# Patient Record
Sex: MALE | Race: WHITE | NOT HISPANIC OR LATINO | Employment: PART TIME | URBAN - METROPOLITAN AREA
[De-identification: names, ages, dates, MRNs, and addresses within clinical notes are randomized per-mention and may not be internally consistent; named-entity substitution may affect disease eponyms.]

---

## 2023-02-22 LAB
ALANINE AMINOTRANSFERASE (SGPT) (U/L) IN SER/PLAS: 19 U/L (ref 10–52)
ALBUMIN (G/DL) IN SER/PLAS: 4.8 G/DL (ref 3.4–5)
ALKALINE PHOSPHATASE (U/L) IN SER/PLAS: 79 U/L (ref 33–136)
ANION GAP IN SER/PLAS: 11 MMOL/L (ref 10–20)
ASPARTATE AMINOTRANSFERASE (SGOT) (U/L) IN SER/PLAS: 19 U/L (ref 9–39)
BASOPHILS (10*3/UL) IN BLOOD BY AUTOMATED COUNT: 0.03 X10E9/L (ref 0–0.1)
BASOPHILS/100 LEUKOCYTES IN BLOOD BY AUTOMATED COUNT: 0.4 % (ref 0–2)
BILIRUBIN TOTAL (MG/DL) IN SER/PLAS: 0.7 MG/DL (ref 0–1.2)
CALCIUM (MG/DL) IN SER/PLAS: 9.2 MG/DL (ref 8.6–10.3)
CARBON DIOXIDE, TOTAL (MMOL/L) IN SER/PLAS: 29 MMOL/L (ref 21–32)
CHLORIDE (MMOL/L) IN SER/PLAS: 101 MMOL/L (ref 98–107)
CHOLESTEROL (MG/DL) IN SER/PLAS: 199 MG/DL (ref 0–199)
CHOLESTEROL IN HDL (MG/DL) IN SER/PLAS: 72.6 MG/DL
CHOLESTEROL/HDL RATIO: 2.7
CREATININE (MG/DL) IN SER/PLAS: 0.97 MG/DL (ref 0.5–1.3)
EOSINOPHILS (10*3/UL) IN BLOOD BY AUTOMATED COUNT: 0.06 X10E9/L (ref 0–0.7)
EOSINOPHILS/100 LEUKOCYTES IN BLOOD BY AUTOMATED COUNT: 0.9 % (ref 0–6)
ERYTHROCYTE DISTRIBUTION WIDTH (RATIO) BY AUTOMATED COUNT: 12.6 % (ref 11.5–14.5)
ERYTHROCYTE MEAN CORPUSCULAR HEMOGLOBIN CONCENTRATION (G/DL) BY AUTOMATED: 33.3 G/DL (ref 32–36)
ERYTHROCYTE MEAN CORPUSCULAR VOLUME (FL) BY AUTOMATED COUNT: 91 FL (ref 80–100)
ERYTHROCYTES (10*6/UL) IN BLOOD BY AUTOMATED COUNT: 4.85 X10E12/L (ref 4.5–5.9)
ESTIMATED AVERAGE GLUCOSE FOR HBA1C: 111 MG/DL
GFR MALE: 87 ML/MIN/1.73M2
GLUCOSE (MG/DL) IN SER/PLAS: 92 MG/DL (ref 74–99)
HEMATOCRIT (%) IN BLOOD BY AUTOMATED COUNT: 44.2 % (ref 41–52)
HEMOGLOBIN (G/DL) IN BLOOD: 14.7 G/DL (ref 13.5–17.5)
HEMOGLOBIN A1C/HEMOGLOBIN TOTAL IN BLOOD: 5.5 %
IMMATURE GRANULOCYTES/100 LEUKOCYTES IN BLOOD BY AUTOMATED COUNT: 0.4 % (ref 0–0.9)
LDL: 116 MG/DL (ref 0–99)
LEUKOCYTES (10*3/UL) IN BLOOD BY AUTOMATED COUNT: 6.8 X10E9/L (ref 4.4–11.3)
LYMPHOCYTES (10*3/UL) IN BLOOD BY AUTOMATED COUNT: 1.81 X10E9/L (ref 1.2–4.8)
LYMPHOCYTES/100 LEUKOCYTES IN BLOOD BY AUTOMATED COUNT: 26.8 % (ref 13–44)
MONOCYTES (10*3/UL) IN BLOOD BY AUTOMATED COUNT: 0.68 X10E9/L (ref 0.1–1)
MONOCYTES/100 LEUKOCYTES IN BLOOD BY AUTOMATED COUNT: 10.1 % (ref 2–10)
NEUTROPHILS (10*3/UL) IN BLOOD BY AUTOMATED COUNT: 4.14 X10E9/L (ref 1.2–7.7)
NEUTROPHILS/100 LEUKOCYTES IN BLOOD BY AUTOMATED COUNT: 61.4 % (ref 40–80)
PLATELETS (10*3/UL) IN BLOOD AUTOMATED COUNT: 284 X10E9/L (ref 150–450)
POTASSIUM (MMOL/L) IN SER/PLAS: 4.3 MMOL/L (ref 3.5–5.3)
PROTEIN TOTAL: 7.7 G/DL (ref 6.4–8.2)
SODIUM (MMOL/L) IN SER/PLAS: 137 MMOL/L (ref 136–145)
TRIGLYCERIDE (MG/DL) IN SER/PLAS: 51 MG/DL (ref 0–149)
UREA NITROGEN (MG/DL) IN SER/PLAS: 15 MG/DL (ref 6–23)
VLDL: 10 MG/DL (ref 0–40)

## 2023-02-23 LAB — PROSTATE SPECIFIC AG (NG/ML) IN SER/PLAS: 0.54 NG/ML (ref 0–4)

## 2023-07-24 DIAGNOSIS — I10 ESSENTIAL (PRIMARY) HYPERTENSION: ICD-10-CM

## 2023-07-24 PROBLEM — N52.9 ERECTILE DYSFUNCTION: Status: ACTIVE | Noted: 2023-07-24

## 2023-07-24 PROBLEM — R73.9 HYPERGLYCEMIA: Status: ACTIVE | Noted: 2023-07-24

## 2023-07-24 PROBLEM — K64.4 HEMORRHOIDS, EXTERNAL: Status: ACTIVE | Noted: 2023-07-24

## 2023-07-24 PROBLEM — J45.20 MILD INTERMITTENT ASTHMA WITHOUT COMPLICATION (HHS-HCC): Status: ACTIVE | Noted: 2023-07-24

## 2023-07-24 PROBLEM — N40.2 PROSTATE NODULE: Status: ACTIVE | Noted: 2023-07-24

## 2023-07-24 PROBLEM — N41.9 PROSTATITIS: Status: RESOLVED | Noted: 2023-07-24 | Resolved: 2023-07-24

## 2023-07-24 PROBLEM — H57.10 EYE PAIN: Status: RESOLVED | Noted: 2023-07-24 | Resolved: 2023-07-24

## 2023-07-24 PROBLEM — M17.0 ARTHRITIS OF BOTH KNEES: Status: ACTIVE | Noted: 2023-07-24

## 2023-07-24 PROBLEM — R06.02 SHORTNESS OF BREATH ON EXERTION: Status: ACTIVE | Noted: 2023-07-24

## 2023-07-24 PROBLEM — K21.9 GERD (GASTROESOPHAGEAL REFLUX DISEASE): Status: ACTIVE | Noted: 2023-07-24

## 2023-07-24 PROBLEM — M25.50 ARTHRALGIA: Status: ACTIVE | Noted: 2023-07-24

## 2023-07-24 PROBLEM — M25.562 LEFT KNEE PAIN: Status: ACTIVE | Noted: 2023-07-24

## 2023-07-24 RX ORDER — MELOXICAM 15 MG/1
1 TABLET ORAL DAILY PRN
COMMUNITY
Start: 2021-01-07 | End: 2024-01-29

## 2023-07-24 RX ORDER — NAPROXEN 500 MG/1
TABLET ORAL
COMMUNITY
Start: 2023-02-01 | End: 2024-01-10 | Stop reason: ALTCHOICE

## 2023-07-24 RX ORDER — LOSARTAN POTASSIUM 50 MG/1
1 TABLET ORAL DAILY
COMMUNITY
Start: 2022-05-10 | End: 2024-01-09 | Stop reason: WASHOUT

## 2023-07-24 RX ORDER — SILDENAFIL 100 MG/1
TABLET, FILM COATED ORAL
COMMUNITY
Start: 2018-08-30

## 2023-07-24 RX ORDER — AMLODIPINE BESYLATE 10 MG/1
1 TABLET ORAL DAILY
COMMUNITY
Start: 2022-05-10 | End: 2024-01-09 | Stop reason: WASHOUT

## 2023-07-24 RX ORDER — OMEPRAZOLE 40 MG/1
CAPSULE, DELAYED RELEASE ORAL
COMMUNITY
Start: 2020-12-28 | End: 2024-01-10 | Stop reason: SDUPTHER

## 2023-07-24 RX ORDER — ALBUTEROL SULFATE 90 UG/1
AEROSOL, METERED RESPIRATORY (INHALATION)
COMMUNITY
Start: 2018-02-07

## 2023-07-24 RX ORDER — FLUTICASONE PROPIONATE 50 MCG
SPRAY, SUSPENSION (ML) NASAL
COMMUNITY
Start: 2022-04-25

## 2023-07-24 RX ORDER — HYALURONATE SODIUM, STABILIZED 60 MG/3 ML
SYRINGE (ML) INTRAARTICULAR
COMMUNITY
Start: 2023-02-17 | End: 2024-01-10 | Stop reason: ALTCHOICE

## 2023-07-25 RX ORDER — LOSARTAN POTASSIUM 50 MG/1
50 TABLET ORAL DAILY
Qty: 90 TABLET | Refills: 0 | Status: SHIPPED | OUTPATIENT
Start: 2023-07-25 | End: 2024-01-10 | Stop reason: ALTCHOICE

## 2023-07-25 RX ORDER — AMLODIPINE BESYLATE 10 MG/1
10 TABLET ORAL DAILY
Qty: 90 TABLET | Refills: 0 | Status: SHIPPED | OUTPATIENT
Start: 2023-07-25 | End: 2024-01-10 | Stop reason: ALTCHOICE

## 2024-01-09 PROBLEM — R06.02 SHORTNESS OF BREATH ON EXERTION: Status: RESOLVED | Noted: 2023-07-24 | Resolved: 2024-01-09

## 2024-01-10 ENCOUNTER — OFFICE VISIT (OUTPATIENT)
Dept: PRIMARY CARE | Facility: CLINIC | Age: 64
End: 2024-01-10
Payer: MEDICARE

## 2024-01-10 VITALS
HEART RATE: 64 BPM | OXYGEN SATURATION: 96 % | DIASTOLIC BLOOD PRESSURE: 98 MMHG | TEMPERATURE: 98.6 F | SYSTOLIC BLOOD PRESSURE: 142 MMHG

## 2024-01-10 DIAGNOSIS — E78.2 MIXED HYPERLIPIDEMIA: ICD-10-CM

## 2024-01-10 DIAGNOSIS — K21.9 GASTROESOPHAGEAL REFLUX DISEASE WITHOUT ESOPHAGITIS: ICD-10-CM

## 2024-01-10 DIAGNOSIS — M17.12 PRIMARY OSTEOARTHRITIS OF LEFT KNEE: ICD-10-CM

## 2024-01-10 DIAGNOSIS — K22.719 BARRETT'S ESOPHAGUS WITH DYSPLASIA: ICD-10-CM

## 2024-01-10 DIAGNOSIS — I10 BENIGN HYPERTENSION: Primary | ICD-10-CM

## 2024-01-10 PROCEDURE — 3080F DIAST BP >= 90 MM HG: CPT | Performed by: FAMILY MEDICINE

## 2024-01-10 PROCEDURE — 99214 OFFICE O/P EST MOD 30 MIN: CPT | Performed by: FAMILY MEDICINE

## 2024-01-10 PROCEDURE — 1036F TOBACCO NON-USER: CPT | Performed by: FAMILY MEDICINE

## 2024-01-10 PROCEDURE — 3077F SYST BP >= 140 MM HG: CPT | Performed by: FAMILY MEDICINE

## 2024-01-10 PROCEDURE — 1125F AMNT PAIN NOTED PAIN PRSNT: CPT | Performed by: FAMILY MEDICINE

## 2024-01-10 PROCEDURE — 1159F MED LIST DOCD IN RCRD: CPT | Performed by: FAMILY MEDICINE

## 2024-01-10 RX ORDER — AMLODIPINE BESYLATE 5 MG/1
5 TABLET ORAL DAILY
COMMUNITY
Start: 2023-12-17 | End: 2024-01-10 | Stop reason: SDUPTHER

## 2024-01-10 RX ORDER — CHLORHEXIDINE GLUCONATE ORAL RINSE 1.2 MG/ML
5 SOLUTION DENTAL AS NEEDED
COMMUNITY
Start: 2023-11-02

## 2024-01-10 RX ORDER — OMEPRAZOLE 40 MG/1
CAPSULE, DELAYED RELEASE ORAL
Qty: 90 CAPSULE | Refills: 3 | Status: SHIPPED | OUTPATIENT
Start: 2024-01-10

## 2024-01-10 RX ORDER — LOSARTAN POTASSIUM 100 MG/1
100 TABLET ORAL DAILY
COMMUNITY
Start: 2023-12-17 | End: 2024-01-10 | Stop reason: SDUPTHER

## 2024-01-10 RX ORDER — ROSUVASTATIN CALCIUM 10 MG/1
10 TABLET, COATED ORAL DAILY
Qty: 90 TABLET | Refills: 3 | Status: SHIPPED | OUTPATIENT
Start: 2024-01-10 | End: 2025-01-09

## 2024-01-10 RX ORDER — AMLODIPINE BESYLATE 5 MG/1
5 TABLET ORAL DAILY
Qty: 90 TABLET | Refills: 3 | Status: SHIPPED | OUTPATIENT
Start: 2024-01-10

## 2024-01-10 RX ORDER — LOSARTAN POTASSIUM 100 MG/1
100 TABLET ORAL DAILY
Qty: 90 TABLET | Refills: 3 | Status: SHIPPED | OUTPATIENT
Start: 2024-01-10

## 2024-01-10 ASSESSMENT — LIFESTYLE VARIABLES
HOW OFTEN DO YOU HAVE SIX OR MORE DRINKS ON ONE OCCASION: NEVER
SKIP TO QUESTIONS 9-10: 1
HOW OFTEN DO YOU HAVE A DRINK CONTAINING ALCOHOL: 4 OR MORE TIMES A WEEK
HOW MANY STANDARD DRINKS CONTAINING ALCOHOL DO YOU HAVE ON A TYPICAL DAY: 1 OR 2
AUDIT-C TOTAL SCORE: 4

## 2024-01-10 ASSESSMENT — PATIENT HEALTH QUESTIONNAIRE - PHQ9
2. FEELING DOWN, DEPRESSED OR HOPELESS: NOT AT ALL
1. LITTLE INTEREST OR PLEASURE IN DOING THINGS: NOT AT ALL
SUM OF ALL RESPONSES TO PHQ9 QUESTIONS 1 & 2: 0

## 2024-01-10 ASSESSMENT — ENCOUNTER SYMPTOMS
LOSS OF SENSATION IN FEET: 0
DEPRESSION: 0
OCCASIONAL FEELINGS OF UNSTEADINESS: 0

## 2024-01-10 ASSESSMENT — PAIN SCALES - GENERAL: PAINLEVEL: 3

## 2024-01-10 NOTE — PROGRESS NOTES
Subjective   Patient ID: Gian Prado is a 64 y.o. male who presents for 3 MO F/U.    Hypertension  -Patient is here for follow-up of elevated blood pressure.   -Blood pressure is well controlled.  -Cardiac symptoms: none.   -Patient denies chest pain, dyspnea, and irregular heart beat.   -Cardiologist:    Osteoarthritis/Pain Management  -History of: bilateral knee osteoarthritis  -F/U: Worsening pain.  Worse in left knee.  Worse as day progresses.  Using golf cart due to pain.  Pain stretching leg out.   -Treatment: Steroid injections and synvisc - no benefit.   Meloxicam - no benefit.    -Past Evaluation:  -Specialist: Dr. Giron  -Past Medications:           Review of Systems    Objective   BP (!) 142/98 (BP Location: Right arm, Patient Position: Sitting)   Pulse 64   Temp 37 °C (98.6 °F) (Temporal)   SpO2 96%     Physical Exam  Vitals reviewed.   Constitutional:       General: He is not in acute distress.  Cardiovascular:      Rate and Rhythm: Normal rate and regular rhythm.   Pulmonary:      Effort: Pulmonary effort is normal.      Breath sounds: No wheezing or rhonchi.   Musculoskeletal:      Right lower leg: No edema.      Left lower leg: No edema.   Lymphadenopathy:      Cervical: No cervical adenopathy.   Neurological:      Mental Status: He is alert.         Assessment/Plan   Diagnoses and all orders for this visit:  Benign hypertension  Mixed hyperlipidemia  Gastroesophageal reflux disease without esophagitis  Primary osteoarthritis of left knee    Patient Instructions   For knee osteoarthritis - recommend seeing Dr. Giron for opinion on replacement    For blood pressure - continue current regimen.      For cholesterol - pt has elevated CT cardiac score - recommend crestor 10mg once a day.      For barretts - stable on PPI medication.     For immunizatinos - recommend prevnar 20 (pneumonia vaccine), yearly flu shot, and shingles vaccine (shingrix).      Follow up in 6 months.

## 2024-01-10 NOTE — PATIENT INSTRUCTIONS
For knee osteoarthritis - recommend seeing Dr. Giron for opinion on replacement    For blood pressure - continue current regimen.      For cholesterol - pt has elevated CT cardiac score - recommend crestor 10mg once a day.      For barretts - stable on PPI medication.     For immunizatinos - recommend prevnar 20 (pneumonia vaccine), yearly flu shot, and shingles vaccine (shingrix).      Follow up in 6 months.

## 2024-01-26 DIAGNOSIS — M25.50 PAIN IN UNSPECIFIED JOINT: ICD-10-CM

## 2024-01-29 RX ORDER — MELOXICAM 15 MG/1
15 TABLET ORAL DAILY PRN
Qty: 90 TABLET | Refills: 3 | Status: SHIPPED | OUTPATIENT
Start: 2024-01-29

## 2024-02-06 ENCOUNTER — HOSPITAL ENCOUNTER (OUTPATIENT)
Facility: HOSPITAL | Age: 64
Setting detail: SURGERY ADMIT
End: 2024-02-06
Attending: ORTHOPAEDIC SURGERY | Admitting: ORTHOPAEDIC SURGERY
Payer: MEDICARE

## 2024-02-06 ENCOUNTER — HOSPITAL ENCOUNTER (OUTPATIENT)
Dept: RADIOLOGY | Facility: HOSPITAL | Age: 64
Discharge: HOME | End: 2024-02-06
Payer: MEDICARE

## 2024-02-06 ENCOUNTER — OFFICE VISIT (OUTPATIENT)
Dept: ORTHOPEDIC SURGERY | Facility: CLINIC | Age: 64
End: 2024-02-06
Payer: MEDICARE

## 2024-02-06 DIAGNOSIS — M17.0 ARTHRITIS OF BOTH KNEES: ICD-10-CM

## 2024-02-06 DIAGNOSIS — M17.0 ARTHRITIS OF BOTH KNEES: Primary | ICD-10-CM

## 2024-02-06 PROCEDURE — 1036F TOBACCO NON-USER: CPT | Performed by: ORTHOPAEDIC SURGERY

## 2024-02-06 PROCEDURE — 1159F MED LIST DOCD IN RCRD: CPT | Performed by: ORTHOPAEDIC SURGERY

## 2024-02-06 PROCEDURE — 99215 OFFICE O/P EST HI 40 MIN: CPT | Performed by: ORTHOPAEDIC SURGERY

## 2024-02-06 PROCEDURE — 1160F RVW MEDS BY RX/DR IN RCRD: CPT | Performed by: ORTHOPAEDIC SURGERY

## 2024-02-06 PROCEDURE — 73564 X-RAY EXAM KNEE 4 OR MORE: CPT | Mod: LT

## 2024-02-06 PROCEDURE — 1125F AMNT PAIN NOTED PAIN PRSNT: CPT | Performed by: ORTHOPAEDIC SURGERY

## 2024-02-06 RX ORDER — ACETAMINOPHEN 325 MG/1
975 TABLET ORAL ONCE
Status: CANCELLED | OUTPATIENT
Start: 2024-02-06 | End: 2024-02-06

## 2024-02-06 RX ORDER — SODIUM CHLORIDE 9 MG/ML
100 INJECTION, SOLUTION INTRAVENOUS CONTINUOUS
Status: CANCELLED | OUTPATIENT
Start: 2024-02-06

## 2024-02-06 RX ORDER — CELECOXIB 400 MG/1
400 CAPSULE ORAL ONCE
Status: CANCELLED | OUTPATIENT
Start: 2024-02-06 | End: 2024-02-06

## 2024-02-06 NOTE — PROGRESS NOTES
This is a consultation from Dr. Keith Doe DO for   Chief Complaint   Patient presents with    Left Knee - Pain     LT KNEE PAIN- CONSULT SURGERY       This is a 64 y.o. male who presents for follow-up for his left knee.  Patient has severe left knee arthritis, had a gel injection about 6 months ago.  Did not work very well and since then his pains been getting worse.  He is experience severe exacerbation of his left knee pain over the last few weeks, sharp stabbing pain over the lateral aspect of the knee.  Associated with instability and difficulty walking.  He has not had an extensive trial of nonsurgical management including use of anti-inflammatories physical therapy activity modifications assistive devices cortisone injections.  Despite that he is severe and worsening pain which impacts quality of life and activities of daily living like consider total knee    Physical Exam    There has been no interval change in this patient's past medical, surgical, medications, allergies, family history or social history since the most recent visit to a provider within our department. 14 point review of systems was performed, reviewed, and negative except for pertinent positives documented in the history of present illness.     Constitutional: well developed, well nourished male in no acute distress  Psychiatric: normal mood, appropriate affect  Eyes: sclera anicteric  HENT: normocephalic/atraumatic  CV: regular rate and rhythm   Respiratory: non labored breathing  Integumentary: no rash  Neurological: moves all extremities    Left knee exam: skin intact no lacerations or abrations.  1+ effusion.  Tender lateral joint line. negative log roll negative patellar grind. ROM 5-100. stable to varus and valgus stress at 0 and 30 degrees. negative lachman negative posterior drawer negative renée. 5/5 ehl/fhl/gs/ta. silt s/s/sp/dp/t. 2+ dp/pt        Xrays were ordered by me, they were reviewed and independently interpreted  by me today, they show severe degenerative disease bone-on-bone arthritis subchondral sclerosis osteophyte formation Kellgren-Leon grade 4    Procedures      Impression/Plan: This is a 64 y.o. male with severe end-stage arthritis left knee that is failed nonoperative management.  Patient like to proceed with left total knee.    I had an extensive discussion with the patient regarding her condition and possible treatment options. Nonsurgical treatment for left knee arthritis includes activity modification, weight loss, use of a cane or other assistive device, pain medications and nonsteroidal anti-inflammatory medications, joint injections, and physical therapy. The patient has attempted non-surgical treatment for this condition for greater than 6 months and it has failed. We discussed the risks benefits and alternatives of total knee arthroplasty. Benefits of joint replacement include: Relief of pain, improvement of function, and improved quality of life. Alternatives include observation and watchful waiting, and the nonsurgical modalities noted above.     Patella discussed with the patient that during total knee arthroplasty prosthetic resurfacing of the patella may or may not be performed at the discretion of thesurgeon during surgery. In the event that prosthetic patellar resurfacing is not performed, nonprosthetic arthroplasty will be performed with removal of bone spurs, circumferential synovectomy and electrocautery. Patient was informed that anterior knee pain and patellofemoral crepitus can potentially occur after knee surgery with or without prosthetic patellar resurfacing.    We discussed the risks of complications as well as the risks of morbidity and mortality related to surgical treatment with total joint replacement. We reviewed the fact that total joint replacement is major elective surgery with significant associated surgical and procedural risk factors as detailed below.   Risks include: Pain,  blood loss, damage to nearby anatomical structures including but not limited to nerves or blood vessels muscles tendons and bone, failure surgery to ameliorate symptoms, persistence of pain surrounding the affected joint, mechanical failure of the prosthesis including but not limited to loosening of the prosthesis from bone ,breakage of the prosthesis and dislocation of the prosthesis, change in length or appearance of a limb, infection possibly necessitating further surgery, removal of the prosthesis, or limb amputation, the need for additional surgery for other reasons, blood clots, amputation, and death. No guarantees were implied or given.  All questions were answered and the patient voiced their understanding.     A complete set of surgical instructions was given to the patient. The patient was educated regarding preoperative nutrition, preparation of their home for postoperative rehabilitation, choosing a care partner, medical and dental clearance, the cessation of medications that can cause bleeding or presenting to risk for infection, chlorhexidine bath, nasal swab and decontamination, post operative follow up, and need for medical equipment. Patient was also educated regarding the possibility of same day surgery and related criteria and protocols. The patient will attend our joint class further education, and thereafter they will return for a preoperative visit. A presurgery education booklet was also given to the patient.     surgical plan: Left total knee  implants: DePuy  approach: Standard  DVT ppx aspirin  drugs to stop none  allergy to abx none  post operative abx: ancef +/- vanc (per protocol)  special clearance needed none    BMI Readings from Last 1 Encounters:   09/13/23 29.64 kg/m²      Lab Results   Component Value Date    CREATININE 0.97 02/22/2023     Tobacco Use: Low Risk  (1/10/2024)    Patient History     Smoking Tobacco Use: Never     Smokeless Tobacco Use: Never     Passive Exposure: Not  "on file      Computed MELD 3.0 unavailable. Necessary lab results were not found in the last year.  Computed MELD-Na unavailable. Necessary lab results were not found in the last year.       Lab Results   Component Value Date    HGBA1C 5.5 02/22/2023     No results found for: \"STAPHMRSASCR\"  "

## 2024-02-09 ENCOUNTER — PATIENT MESSAGE (OUTPATIENT)
Dept: PRIMARY CARE | Facility: CLINIC | Age: 64
End: 2024-02-09
Payer: MEDICARE

## 2024-02-09 DIAGNOSIS — E78.2 MIXED HYPERLIPIDEMIA: Primary | ICD-10-CM

## 2024-02-12 RX ORDER — EZETIMIBE 10 MG/1
10 TABLET ORAL DAILY
Qty: 90 TABLET | Refills: 1 | Status: SHIPPED | OUTPATIENT
Start: 2024-02-12 | End: 2024-08-10

## 2024-04-11 ENCOUNTER — APPOINTMENT (OUTPATIENT)
Dept: ORTHOPEDIC SURGERY | Facility: CLINIC | Age: 64
End: 2024-04-11
Payer: COMMERCIAL

## 2024-04-18 ENCOUNTER — LAB (OUTPATIENT)
Dept: LAB | Facility: LAB | Age: 64
End: 2024-04-18
Payer: MEDICARE

## 2024-04-18 ENCOUNTER — APPOINTMENT (OUTPATIENT)
Dept: PREADMISSION TESTING | Facility: HOSPITAL | Age: 64
End: 2024-04-18
Payer: MEDICARE

## 2024-04-18 DIAGNOSIS — N40.1 BENIGN PROSTATIC HYPERPLASIA WITH LOWER URINARY TRACT SYMPTOMS: Primary | ICD-10-CM

## 2024-04-18 PROCEDURE — 36415 COLL VENOUS BLD VENIPUNCTURE: CPT

## 2024-04-18 PROCEDURE — 84153 ASSAY OF PSA TOTAL: CPT

## 2024-04-19 ENCOUNTER — APPOINTMENT (OUTPATIENT)
Dept: PREADMISSION TESTING | Facility: HOSPITAL | Age: 64
End: 2024-04-19
Payer: MEDICARE

## 2024-04-19 LAB — PSA SERPL-MCNC: 0.41 NG/ML

## 2024-05-03 ENCOUNTER — APPOINTMENT (OUTPATIENT)
Dept: ORTHOPEDIC SURGERY | Facility: CLINIC | Age: 64
End: 2024-05-03
Payer: MEDICARE

## 2024-07-08 PROBLEM — M25.50 ARTHRALGIA: Status: RESOLVED | Noted: 2023-07-24 | Resolved: 2024-07-08

## 2024-07-10 ENCOUNTER — OFFICE VISIT (OUTPATIENT)
Dept: PRIMARY CARE | Facility: CLINIC | Age: 64
End: 2024-07-10
Payer: MEDICARE

## 2024-07-10 VITALS
SYSTOLIC BLOOD PRESSURE: 138 MMHG | BODY MASS INDEX: 30.49 KG/M2 | HEART RATE: 65 BPM | DIASTOLIC BLOOD PRESSURE: 84 MMHG | OXYGEN SATURATION: 99 % | HEIGHT: 70 IN | WEIGHT: 213 LBS | TEMPERATURE: 98.7 F

## 2024-07-10 DIAGNOSIS — I10 BENIGN HYPERTENSION: Primary | ICD-10-CM

## 2024-07-10 DIAGNOSIS — K21.9 GASTROESOPHAGEAL REFLUX DISEASE WITHOUT ESOPHAGITIS: ICD-10-CM

## 2024-07-10 DIAGNOSIS — Z12.5 SCREENING FOR PROSTATE CANCER: ICD-10-CM

## 2024-07-10 DIAGNOSIS — M17.12 PRIMARY OSTEOARTHRITIS OF LEFT KNEE: ICD-10-CM

## 2024-07-10 PROCEDURE — 1036F TOBACCO NON-USER: CPT | Performed by: FAMILY MEDICINE

## 2024-07-10 PROCEDURE — 1159F MED LIST DOCD IN RCRD: CPT | Performed by: FAMILY MEDICINE

## 2024-07-10 PROCEDURE — 99213 OFFICE O/P EST LOW 20 MIN: CPT | Performed by: FAMILY MEDICINE

## 2024-07-10 PROCEDURE — 3079F DIAST BP 80-89 MM HG: CPT | Performed by: FAMILY MEDICINE

## 2024-07-10 PROCEDURE — 1160F RVW MEDS BY RX/DR IN RCRD: CPT | Performed by: FAMILY MEDICINE

## 2024-07-10 PROCEDURE — 3075F SYST BP GE 130 - 139MM HG: CPT | Performed by: FAMILY MEDICINE

## 2024-07-10 RX ORDER — LOSARTAN POTASSIUM 100 MG/1
100 TABLET ORAL DAILY
Qty: 90 TABLET | Refills: 3 | Status: SHIPPED | OUTPATIENT
Start: 2024-07-10

## 2024-07-10 RX ORDER — AMLODIPINE BESYLATE 5 MG/1
5 TABLET ORAL DAILY
Qty: 90 TABLET | Refills: 3 | Status: SHIPPED | OUTPATIENT
Start: 2024-07-10

## 2024-07-10 RX ORDER — OMEPRAZOLE 40 MG/1
CAPSULE, DELAYED RELEASE ORAL
Qty: 90 CAPSULE | Refills: 3 | Status: SHIPPED | OUTPATIENT
Start: 2024-07-10

## 2024-07-10 ASSESSMENT — COLUMBIA-SUICIDE SEVERITY RATING SCALE - C-SSRS
1. IN THE PAST MONTH, HAVE YOU WISHED YOU WERE DEAD OR WISHED YOU COULD GO TO SLEEP AND NOT WAKE UP?: NO
6. HAVE YOU EVER DONE ANYTHING, STARTED TO DO ANYTHING, OR PREPARED TO DO ANYTHING TO END YOUR LIFE?: NO
2. HAVE YOU ACTUALLY HAD ANY THOUGHTS OF KILLING YOURSELF?: NO

## 2024-07-10 ASSESSMENT — ENCOUNTER SYMPTOMS
OCCASIONAL FEELINGS OF UNSTEADINESS: 0
DEPRESSION: 0
LOSS OF SENSATION IN FEET: 0

## 2024-07-10 ASSESSMENT — PATIENT HEALTH QUESTIONNAIRE - PHQ9
SUM OF ALL RESPONSES TO PHQ9 QUESTIONS 1 AND 2: 0
2. FEELING DOWN, DEPRESSED OR HOPELESS: NOT AT ALL
1. LITTLE INTEREST OR PLEASURE IN DOING THINGS: NOT AT ALL

## 2024-07-10 NOTE — PATIENT INSTRUCTIONS
Here for follow up.  Blood pressure well controlled.  We will refill medications. Pt scheduled for knee surgery.  We will follow up in 6 months for physical - medicare physical.  Blood work 1 week prior.

## 2024-07-10 NOTE — PROGRESS NOTES
"Subjective   Patient ID: Gian Prado is a 64 y.o. male who presents for Follow-up.    pHypertension  -Patient is here for follow-up of elevated blood pressure.  No new issues.    -Blood pressure is well controlled. Checking at home mid 120's/low 80's.    -Cardiac symptoms: none.   -Patient denies chest pain, dyspnea, and irregular heart beat.   -Cardiologist:    Osteoarthritis/Pain Management  -History of: bilateral knee osteoarthritis  -F/U: Left knee OA - pt is seeing Dr. Zelaya - pain is worsening.  August 12th.  Pt is scheduled for preop testing.    -Treatment: Steroid injections and synvisc - no benefit.   Meloxicam - no benefit.    -Past Evaluation:  -Specialist: Dr. Zelaya  -Past Medications:           Review of Systems    Objective   /84   Pulse 65   Temp 37.1 °C (98.7 °F)   Ht 1.778 m (5' 10\")   Wt 96.6 kg (213 lb)   SpO2 99%   BMI 30.56 kg/m²     Physical Exam  Vitals reviewed.   Constitutional:       General: He is not in acute distress.  Cardiovascular:      Rate and Rhythm: Normal rate and regular rhythm.   Pulmonary:      Effort: Pulmonary effort is normal.      Breath sounds: No wheezing or rhonchi.   Musculoskeletal:      Right lower leg: No edema.      Left lower leg: No edema.   Lymphadenopathy:      Cervical: No cervical adenopathy.   Neurological:      Mental Status: He is alert.         Assessment/Plan   Diagnoses and all orders for this visit:  Benign hypertension  Primary osteoarthritis of left knee    Patient Instructions   Here for follow up.  Blood pressure well controlled.  We will refill medications. Pt scheduled for knee surgery.  We will follow up in 6 months for physical - medicare physical.  Blood work 1 week prior.         "

## 2024-07-29 ENCOUNTER — PRE-ADMISSION TESTING (OUTPATIENT)
Dept: PREADMISSION TESTING | Facility: HOSPITAL | Age: 64
End: 2024-07-29
Payer: MEDICARE

## 2024-07-29 VITALS
RESPIRATION RATE: 16 BRPM | WEIGHT: 207.23 LBS | DIASTOLIC BLOOD PRESSURE: 89 MMHG | SYSTOLIC BLOOD PRESSURE: 154 MMHG | OXYGEN SATURATION: 98 % | HEIGHT: 70 IN | BODY MASS INDEX: 29.67 KG/M2 | HEART RATE: 66 BPM | TEMPERATURE: 97.5 F

## 2024-07-29 DIAGNOSIS — Z01.818 PREOPERATIVE EXAMINATION: Primary | ICD-10-CM

## 2024-07-29 DIAGNOSIS — M17.0 ARTHRITIS OF BOTH KNEES: ICD-10-CM

## 2024-07-29 LAB
ALBUMIN SERPL BCP-MCNC: 4.3 G/DL (ref 3.4–5)
ALP SERPL-CCNC: 76 U/L (ref 33–136)
ALT SERPL W P-5'-P-CCNC: 18 U/L (ref 10–52)
ANION GAP SERPL CALC-SCNC: 14 MMOL/L (ref 10–20)
AST SERPL W P-5'-P-CCNC: 18 U/L (ref 9–39)
ATRIAL RATE: 62 BPM
BASOPHILS # BLD AUTO: 0.02 X10*3/UL (ref 0–0.1)
BASOPHILS NFR BLD AUTO: 0.3 %
BILIRUB SERPL-MCNC: 0.5 MG/DL (ref 0–1.2)
BUN SERPL-MCNC: 18 MG/DL (ref 6–23)
CALCIUM SERPL-MCNC: 8.9 MG/DL (ref 8.6–10.3)
CHLORIDE SERPL-SCNC: 105 MMOL/L (ref 98–107)
CO2 SERPL-SCNC: 27 MMOL/L (ref 21–32)
CREAT SERPL-MCNC: 1.17 MG/DL (ref 0.5–1.3)
EGFRCR SERPLBLD CKD-EPI 2021: 69 ML/MIN/1.73M*2
EOSINOPHIL # BLD AUTO: 0.07 X10*3/UL (ref 0–0.7)
EOSINOPHIL NFR BLD AUTO: 1.1 %
ERYTHROCYTE [DISTWIDTH] IN BLOOD BY AUTOMATED COUNT: 12.6 % (ref 11.5–14.5)
GLUCOSE SERPL-MCNC: 84 MG/DL (ref 74–99)
HCT VFR BLD AUTO: 39.4 % (ref 41–52)
HGB BLD-MCNC: 13.2 G/DL (ref 13.5–17.5)
IMM GRANULOCYTES # BLD AUTO: 0.03 X10*3/UL (ref 0–0.7)
IMM GRANULOCYTES NFR BLD AUTO: 0.5 % (ref 0–0.9)
LYMPHOCYTES # BLD AUTO: 1.67 X10*3/UL (ref 1.2–4.8)
LYMPHOCYTES NFR BLD AUTO: 25.5 %
MCH RBC QN AUTO: 30.6 PG (ref 26–34)
MCHC RBC AUTO-ENTMCNC: 33.5 G/DL (ref 32–36)
MCV RBC AUTO: 91 FL (ref 80–100)
MONOCYTES # BLD AUTO: 0.8 X10*3/UL (ref 0.1–1)
MONOCYTES NFR BLD AUTO: 12.2 %
NEUTROPHILS # BLD AUTO: 3.95 X10*3/UL (ref 1.2–7.7)
NEUTROPHILS NFR BLD AUTO: 60.4 %
NRBC BLD-RTO: 0 /100 WBCS (ref 0–0)
P OFFSET: 207 MS
P ONSET: 164 MS
PLATELET # BLD AUTO: 236 X10*3/UL (ref 150–450)
POTASSIUM SERPL-SCNC: 4.2 MMOL/L (ref 3.5–5.3)
PR INTERVAL: 120 MS
PROT SERPL-MCNC: 6.8 G/DL (ref 6.4–8.2)
Q ONSET: 224 MS
QRS COUNT: 10 BEATS
QRS DURATION: 90 MS
QT INTERVAL: 386 MS
QTC CALCULATION(BAZETT): 391 MS
QTC FREDERICIA: 390 MS
R AXIS: -2 DEGREES
RBC # BLD AUTO: 4.32 X10*6/UL (ref 4.5–5.9)
SODIUM SERPL-SCNC: 142 MMOL/L (ref 136–145)
T AXIS: 16 DEGREES
T OFFSET: 417 MS
VENTRICULAR RATE: 62 BPM
WBC # BLD AUTO: 6.5 X10*3/UL (ref 4.4–11.3)

## 2024-07-29 PROCEDURE — 87081 CULTURE SCREEN ONLY: CPT | Mod: GEALAB | Performed by: NURSE PRACTITIONER

## 2024-07-29 PROCEDURE — 85025 COMPLETE CBC W/AUTO DIFF WBC: CPT | Performed by: NURSE PRACTITIONER

## 2024-07-29 PROCEDURE — 99204 OFFICE O/P NEW MOD 45 MIN: CPT | Performed by: NURSE PRACTITIONER

## 2024-07-29 PROCEDURE — 93010 ELECTROCARDIOGRAM REPORT: CPT | Performed by: INTERNAL MEDICINE

## 2024-07-29 PROCEDURE — 36415 COLL VENOUS BLD VENIPUNCTURE: CPT

## 2024-07-29 PROCEDURE — 84075 ASSAY ALKALINE PHOSPHATASE: CPT | Performed by: NURSE PRACTITIONER

## 2024-07-29 PROCEDURE — 93005 ELECTROCARDIOGRAM TRACING: CPT

## 2024-07-29 RX ORDER — CHLORHEXIDINE GLUCONATE 40 MG/ML
1 SOLUTION TOPICAL DAILY
Start: 2024-07-29 | End: 2024-08-03

## 2024-07-29 RX ORDER — CHLORHEXIDINE GLUCONATE ORAL RINSE 1.2 MG/ML
15 SOLUTION DENTAL DAILY
Qty: 30 ML | Refills: 0 | Status: SHIPPED | OUTPATIENT
Start: 2024-07-29 | End: 2024-07-31

## 2024-07-29 ASSESSMENT — DUKE ACTIVITY SCORE INDEX (DASI)
CAN YOU CLIMB A FLIGHT OF STAIRS OR WALK UP A HILL: YES
CAN YOU WALK INDOORS, SUCH AS AROUND YOUR HOUSE: YES
DASI METS SCORE: 9.9
CAN YOU DO MODERATE WORK AROUND THE HOUSE LIKE VACUUMING, SWEEPING FLOORS OR CARRYING GROCERIES: YES
CAN YOU PARTICIPATE IN STRENOUS SPORTS LIKE SWIMMING, SINGLES TENNIS, FOOTBALL, BASKETBALL, OR SKIING: YES
CAN YOU WALK A BLOCK OR TWO ON LEVEL GROUND: YES
TOTAL_SCORE: 58.2
CAN YOU HAVE SEXUAL RELATIONS: YES
CAN YOU DO HEAVY WORK AROUND THE HOUSE LIKE SCRUBBING FLOORS OR LIFTING AND MOVING HEAVY FURNITURE: YES
CAN YOU DO LIGHT WORK AROUND THE HOUSE LIKE DUSTING OR WASHING DISHES: YES
CAN YOU PARTICIPATE IN MODERATE RECREATIONAL ACTIVITIES LIKE GOLF, BOWLING, DANCING, DOUBLES TENNIS OR THROWING A BASEBALL OR FOOTBALL: YES
CAN YOU TAKE CARE OF YOURSELF (EAT, DRESS, BATHE, OR USE TOILET): YES
CAN YOU DO YARD WORK LIKE RAKING LEAVES, WEEDING OR PUSHING A MOWER: YES
CAN YOU RUN A SHORT DISTANCE: YES

## 2024-07-29 ASSESSMENT — LIFESTYLE VARIABLES: SMOKING_STATUS: NONSMOKER

## 2024-07-29 ASSESSMENT — PAIN - FUNCTIONAL ASSESSMENT: PAIN_FUNCTIONAL_ASSESSMENT: 0-10

## 2024-07-29 ASSESSMENT — ENCOUNTER SYMPTOMS
CARDIOVASCULAR NEGATIVE: 1
CONSTITUTIONAL NEGATIVE: 1
GASTROINTESTINAL NEGATIVE: 1
NEUROLOGICAL NEGATIVE: 1
MUSCULOSKELETAL NEGATIVE: 1
NECK NEGATIVE: 1
EYES NEGATIVE: 1

## 2024-07-29 ASSESSMENT — ACTIVITIES OF DAILY LIVING (ADL): ADL_SCORE: 0

## 2024-07-29 ASSESSMENT — PAIN SCALES - GENERAL: PAINLEVEL_OUTOF10: 2
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
## 2024-07-29 NOTE — PREPROCEDURE INSTRUCTIONS
Thank you for visiting Preadmission Testing (PAT) today for your pre-procedure evaluation, you were seen by     Cherry Wilson CNP  Pre Admission Testing  [facility]  [phone]    This summary includes instructions and information to aid you during your perioperative period.  Please read carefully. If you have any questions about your visit today, please call the number listed above.  If you become ill or have any changes to your health before your surgery, please contact your primary care provider and alert your surgeon.    Preparing for your Surgery       Exercises  Preoperative Deep Breathing Exercises  Why it is important to do deep breathing exercises before my surgery?  Deep breathing exercises strengthen your breathing muscles.  This helps you to recover after your surgery and decreases the chance of breathing complications.  How are the deep breathing exercises done?  Sit straight with your back supported.  Breathe in deeply and slowly through your nose. Your lower rib cage should expand and your abdomen may move forward.  Hold that breath for 3 to 5 seconds.  Breathe out through pursed lips, slowly and completely.  Rest and repeat 10 times every hour while awake.  Rest longer if you become dizzy or lightheaded.       Preoperative Brain Exercises    What are brain exercises?  A brain exercise is any activity that engages your thinking (cognitive) skills.    What types of activities are considered brain exercises?  Jigsaw puzzles, crossword puzzles, word jumble, memory games, word search, and many more.  Many can be found free online or on your phone via a mobile mary.    Why should I do brain exercises before my surgery?  More recent research has shown brain exercise before surgery can lower the risk of postoperative delirium (confusion) which can be especially important for older adults.  Patients who did brain exercises for 5 to 10 hours the days before surgery, cut their risk  of postoperative delirium in half up to 1 week after surgery.    Sit-to-Stand Exercise    What is the sit-to-stand exercise?  The sit-to-stand exercise strengthens the muscles of your lower body and muscles in the center of your body (core muscles for stability) helping to maintain and improve your strength and mobility.  How do I do the sit-to-stand exercise?  The goal is to do this exercise without using your arms or hands.  If this is too difficult, use your arms and hands or a chair with armrests to help slowly push yourself to the standing position and lower yourself back to the sitting position. As the movement becomes easier use your arms and hands less.    Steps to the sit-to-stand exercise  Sit up tall in a sturdy chair, knees bent, feet flat on the floor shoulder-width apart.  Shift your hips/pelvis forward in the chair to correctly position yourself for the next movement.  Lean forward at your hips.  Stand up straight putting equal weight on both feet.  Check to be sure you are properly aligned with the chair, in a slow controlled movement sit back down.  Repeat this exercise 10-15 times.  If needed you can do it fewer times until your strength improves.  Rest for 1 minute.  Do another 10-15 sit-to-stand exercises.  Try to do this in the morning and evening.        Instructions    Preoperative Fasting Guidelines    Why must I stop eating and drinking near surgery time?  With sedation, food or liquid in your stomach can enter your lungs causing serious complications  Food can increase nausea and vomiting  When do I need to stop eating and drinking before my surgery?      Do not eat any food or drink any liquids after midnight the night before your surgery/procedure.  You may have sips of water to take medications.      Simple things you can do to help prevent blood clots     Blood clots are blockages that can form in the body's veins. When a blood clot forms in your deep veins, it may be called a deep vein  thrombosis, or DVT for short. Blood clots can happen in any part of the body where blood flows, but they are most common in the arms and legs. If a piece of a blood clot breaks free and travels to the lungs, it is called a pulmonary embolus (PE). A PE can be a very serious problem.         Being in the hospital or having surgery can raise your chances of getting a blood clot because you may not be well enough to move around as much as you normally do.         Ways you can help prevent blood clots in the hospital       Wearing SCDs  SCDs stands for Sequential Compression Devices.   SCDs are special sleeves that wrap around your legs. They attach to a pump that fills them with air to gently squeeze your legs every few minutes.  This helps return the blood in your legs to your heart.   SCDs should only be taken off when walking or bathing. SCDs may not be comfortable, but they can help save your life.              Pump SCD leg sleeves  Wearing compression stockings - if your doctor orders them. These special snug-fitting stockings gently squeeze your legs to help blood flow.       Walking. Walking helps move the blood in your legs.   If your doctor says it is ok, try walking the halls at least   5 times a day. Ask us to help you get up, so you don't fall.      Taking any blood-thinning medicines your doctor orders.              Ways you can help prevent blood clots at home         Wearing compression stockings - if your doctor orders them.   Walking - to help move the blood in your legs.    Taking any blood-thinning medicines your doctor orders.      Signs of a blood clot or PE    Tell your doctor or nurse right away if you have any of the problems listed below.         If you are at home, seek medical care right away. Call 911 for chest pain or problems breathing.            Signs of a blood clot (DVT) - such as pain, swelling, redness, or warmth in your arm or legs.  Signs of a pulmonary embolism (PE) - such as chest  "pain or feeling short of breath      Tobacco and Alcohol;  Do not drink alcohol or smoke within 24 hours of surgery.  It is best to quit smoking for as long as possible before any surgery or procedure.        Other Instructions  Why did I have my nose, under my arms, and groin swabbed? The purpose of the swab is to identify Staphylococcus aureus inside your nose or on your skin.  The swab was sent to the laboratory for culture.  A positive swab/culture for Staphylococcus aureus is called colonization or carriage.     What is Staphylococcus aureus? Staphylococcus aureus, also known as \"staph\", is a germ found on the skin or in the nose of healthy people.  Sometimes Staphylococcus aureus can get into the body and cause an infection.  This can be minor (such as pimples, boils, or other skin problems).  It might also be serious (such as a blood infection, pneumonia, or a surgical site infection).     What is Staphylococcus aureus colonization or carriage? Colonization or carriage means that a person has the germ but is not sick from it.  These bacteria can be spread on the hands or when breathing or sneezing.   How is Staphylococcus aureus spread? It is most often spread by close contact with a person or item that carries it.   What happens if my culture is positive for Staphylococcus aureus? Your doctor/medical team will use this information to guide any antibiotic treatment which may be necessary.  Regardless of the culture results, we will clean the inside of your nose with a betadine swab just before you have your surgery.   Will I get an infection if I have Staphylococcus aureus in my nose or on my skin? Anyone can get an infection with Staphylococcus aureus.  However, the best way to reduce your risk of infection is to follow the instructions provided to you for the use of your CHG soap and dental rinse.      Body Wash:     What is a home preoperative antibacterial shower? This shower is a way of cleaning the skin " with a germ-killing solution before surgery.  The solution contains chlorhexidine, commonly known as CHG.  CHG is a skin cleanser with germ-killing ability.  Let your doctor know if you are allergic to chlorhexidine.   Why do I need to take a preoperative antibacterial shower? Skin is not sterile.  It is best to try to make your skin as free of germs as possible before surgery.  Proper cleansing with a germ-killing soap before surgery can lower the number of germs on your skin.  This helps to reduce the risk of infection at the surgical site.    Following the instructions listed below will help you prepare your skin for surgery.   How do I use the solution? Steps:  Begin using your CHG soap 5 days before your scheduled surgery on ______8/12/2024_____.     First, wash and rinse your hair using the CHG soap. Keep CHG soap away from ear canals and eyes.  Rinse completely, do not condition.  Hair extensions should be removed. ,      Oral/Dental Rinse:     What is oral/dental rinse?  It is a mouthwash. It is a way of cleaning the mouth with a germ-killing solution before your surgery.  The solution contains chlorhexidine, commonly known as CHG. It is used inside the mouth to kill a bacteria known as Staphylococcus aureus.  Let your doctor know if you are allergic to Chlorhexidine.   Why do I need to use CHG oral/dental rinse? The CHG oral/dental rinse helps to kill a bacteria in your mouth known as Staphylococcus aureus.  This reduces the risk of infection at the surgical site.    Using your CHG oral/dental rinse STEPS: Use your CHG oral/dental rinse after you brush your teeth the night before (at bedtime) and the morning of your surgery.  Follow all directions on your prescription label.    Use the cap on the container to measure 15 ml.  Swish (gargle if you can) the mouthwash in your mouth for at least 30 seconds, (do not swallow) and spit out.  After you use your CHG rinse, do not rinse your mouth with water, drink or  eat.    Please refer to the prescription label for the appropriate time to resume oral intake   What side effects might I have using the CHG oral/dental rinse? CHG rinse will stick to plaque on the teeth.  Brush and floss just before use.  Teeth brushing will help avoid staining of plaque during use.          The Week before Surgery        Seven days before Surgery  Check your PAT medication instructions  Do the exercises provided to you by PAT  Arrange for a responsible, adult licensed  to take you home after surgery and stay with you for 24 hours.  You will not be permitted to drive yourself home if you have received any anesthetic/sedation  Six days before surgery  Check your PAT medication instructions  Do the exercises provided to you by PAT  Start using Chlorhexidene (CHG) body wash if prescribed  Five days before surgery  Check your PAT medication instructions  Do the exercises provided to you by PAT  Continue to use CHG body wash if prescribed  Three days before surgery  Check your PAT medication instructions  Do the exercises provided to you by PAT  Continue to use CHG body wash if prescribed  Two days before surgery  Check your PAT medication instructions  Do the exercises provided to you by PAT  Continue to use CHG body wash if prescribed    The Day before Surgery       Check your PAT medication and all other PAT instructions including when to stop eating and drinking  You will be called with your arrival time for surgery in the late afternoon.  If you do not receive a call please reach out to your surgeon's office.  Do not smoke or drink 24 hours before surgery  Prepare items to bring with you to the hospital  Shower with your chlorhexidine wash if prescribed  Brush your teeth and use your chlorhexidine dental rinse if prescribed    The Day of Surgery       Check your PAT medication instructions  Ensure you follow the instructions for when to stop eating and drinking  Shower, if prescribed use CHG.   Do not apply any lotions, creams, moisturizers, perfume or deodorant  Brush your teeth and use your CHG dental rinse if prescribed  Wear loose comfortable clothing  Avoid make-up  Remove  jewelry and piercings, consider professional piercing removal with a plastic spacer if needed  Bring photo ID and Insurance card  Bring an accurate medication list that includes medication dose, frequency and allergies  Bring a copy of your advanced directives (will, health care power of )  Bring any devices and controllers as well as medical devices you have been provided with for surgery (CPAP, slings, braces, etc.)  Dentures, eyeglasses, and contacts will be removed before surgery, please bring cases for contacts or glasses

## 2024-07-31 LAB — STAPHYLOCOCCUS SPEC CULT: NORMAL

## 2024-08-07 LAB
ATRIAL RATE: 62 BPM
P OFFSET: 207 MS
P ONSET: 164 MS
PR INTERVAL: 120 MS
Q ONSET: 224 MS
QRS COUNT: 10 BEATS
QRS DURATION: 90 MS
QT INTERVAL: 386 MS
QTC CALCULATION(BAZETT): 391 MS
QTC FREDERICIA: 390 MS
R AXIS: -2 DEGREES
T AXIS: 16 DEGREES
T OFFSET: 417 MS
VENTRICULAR RATE: 62 BPM

## 2024-08-07 RX ORDER — POLYETHYLENE GLYCOL 3350 17 G/17G
17 POWDER, FOR SOLUTION ORAL DAILY
Qty: 238 G | Refills: 0 | Status: SHIPPED | OUTPATIENT
Start: 2024-08-07

## 2024-08-07 RX ORDER — DOCUSATE SODIUM 100 MG/1
100 CAPSULE, LIQUID FILLED ORAL 2 TIMES DAILY
Qty: 20 CAPSULE | Refills: 0 | Status: SHIPPED | OUTPATIENT
Start: 2024-08-07 | End: 2024-08-22

## 2024-08-07 RX ORDER — ASPIRIN 325 MG
325 TABLET ORAL DAILY
Qty: 14 TABLET | Refills: 0 | Status: SHIPPED | OUTPATIENT
Start: 2024-08-07 | End: 2024-08-26

## 2024-08-07 RX ORDER — HYDROCODONE BITARTRATE AND ACETAMINOPHEN 5; 325 MG/1; MG/1
2 TABLET ORAL EVERY 8 HOURS PRN
Qty: 42 TABLET | Refills: 0 | Status: SHIPPED | OUTPATIENT
Start: 2024-08-07 | End: 2024-08-19

## 2024-08-07 NOTE — DISCHARGE INSTRUCTIONS
Post op appointment 8/27/24 at 130 pm at the [facility]                            ROGER Ni, RN-BC Orthopedic   for [facility] - Phone: [phone]    Clif Zelaya D.O.  Phone: [phone]    POSTOPERATIVE INSTRUCTIONS: TOTAL HIP & TOTAL KNEE ARTHROPLASTY    General/highlights: Flex/tighten the muscles in the buttocks, thighs and calves often when in chair or bed.  Utilize the incentive spirometer often especially the next 3 days.  Walk at least 10 steps every hour that you are awake.  Take stairs 1 at a time, good leg leads up, bed leg legs down, use the handrails.  You may be weightbearing as tolerated, in general the walker is used for 2 weeks.    PAIN, SWELLING & BRUISING  Some pain, stiffness and swelling is normal for up to 1 year after surgery.  Pain will start to let up over time depending on your activity level.  It is preferable to rest for 24 hours following your surgery  Pain is often delayed for 24-48 hours after surgery.  Pain may be dull/achy, throbbing, or even sharp/nerve sensations  It is normal for swelling and bruising to worsen before it gets better.  These symptoms usually peak 1 week after surgery  Swelling and bruising may appear throughout the leg, all the way down to your toes  Wear your compression stockings every day during the day for 14 days and remove them at night.  This will help control swelling    WOUND CARE INSTRUCTIONS  Your surgical bandage will be removed 2 weeks after surgery at your post-operative visit.  If your bandage becomes compromised, begins to come off before then, or soaks through with drainage call the office immediately.  You may have sutures under the skin that dissolve on their own over time.    As the sutures absorb occasionally a small suture abscess can develop, this is not uncommon for up to 6 weeks, if this occurs please notify your surgeon  immediately.    HYGIENE  You may shower 48 hours after your surgery, provided the Mepilex silver dressing is in place.  No tub bathing or submerging underwater.  Do not scrub directly over the surgical bandage  Do not use any creams, lotions or ointments on the surgical leg for 4 weeks after surgery, or until you have been cleared to do so by your surgeon    GENERAL INSTRUCTIONS  Do not drink alcoholic beverages (beer and wine included) for 24 hours following your surgery, or while you are taking narcotic pain medications  Delay making important decisions until you are fully recovered  You cannot swim or submerge in water for at least 6 weeks after surgery, or until you are cleared by your surgeon.  You may start kneeling 3 months after knee replacement surgery, once you have been cleared by your surgeon.  This may not ever feel “normal” or comfortable    HOME DIET  Resume your normal diet after surgery. If you are on a specific type of diet for your condition, resume that instead.    Choose foods that help promote good bowel habits and prevent constipation, such as foods high in fiber.          POSTOPERATIVE MEDICATIONS    Pain medications have been ordered to help manage pain throughout recovery.  While you are using narcotic pain medication, you should be using a stool softener or laxative to prevent constipation.  My preference is parallax powder once or twice a day when taking the narcotic pain medicine  It is important to eat a small meal or snack before taking pain medications to avoid nausea or stomach upset.    MEDICATION REFILLS - [phone]    If you need to request a medication refill, please call the office between 8:30am-4:30pm, Monday through Friday.    Any calls received outside of this timeframe will be handled on the next business day.    Medication requests received on Saturday or Sunday will be handled on Monday.    Please allow 3-5 business days for all medication requests to be  "processed.    RESTARTING HOME MEDICATIONS  You may restart your home medications the following day after your surgery UNLESS you have been given alternate instructions.    Follow the instructions given to you on your hospital discharge instructions for more information regarding your home medications.    ASSISTIVE DEVICE & MOVEMENT  Initially, you will use a walker or crutches to walk for the first 1-2 weeks.  Once your therapist feels you are ready, you will wean to one crutch or cane followed by no assistive device.  It is important to keep moving throughout recovery.  Walk at least 10 steps every hour that you are awake.  Stairs are part of your recovery, the \"good \"leg leads on the way up, the \"bad \"leg leads on the way down to, use the handrails and not the walker or crutches.  You should be up and walking around several times per day as well as bending your knee and making sure your knee is going completely straight (for knee replacements).  For anterior hip replacements avoid straight leg raise.    NUMBNESS/CLICKING    Decreased sensation or numbness is common on or around the area of the incision due to sensory nerves that are affected at the time of surgery.  The area of numbness will be lateral to the incision  You might always have numbness but the size of the area of numbness should decrease with time.  It is common for patients to have a “click” in the knee with movement.  This is usually nothing to be concerned about.  There are several reasons why your knee can be making these noises, including the implant components rubbing against each other, or a tendons going over a bony prominence.  Grinding can also occur and is not out of the ordinary.  Grinding can be caused by scar tissue formation  Noises will often settle over time once muscle strength improves.    DIFFICULTY SLEEPING    It is very common for patients to have difficulty sleeping at night which can be caused pain, medication, or feeling of " anxiety.  Sleep disturbance typically worsens 4-6 weeks after surgery.  You are permitted to sleep on your back or side with a  pillow between your legs for comfort            DRIVING & TRAVEL  Your surgeon will address this at your post-op appointment.  You must not be taking narcotic pain medication to be cleared to drive. (Usually at least 2 weeks for RIGHT limb and 3 weeks for LEFT limb before driving is permitted)  LEFT LEG JOINT REPLACMENT:  You may drive once you have regained full control of your leg, typically around 2 week after surgery  RIGHT LEG JOINT REPLACEMENT:  You must speak with your surgeon before you resume driving.  Driving can typically be resumed by 4-6 weeks after surgery.  During long distance travel, you should attempt to change position or stand every hour.  You should complete ankle pumps throughout your travel if you are sitting for long periods of time.  If traveling within the first 2 weeks after surgery, you should wear your compression stockings.    DENTAL & OTHER PROCEDURES    All patients must wait a minimum of 3 months for elective procedures, including routine dental cleanings.  For any dental appointment - cleaning or dental procedures - patients must take a prophylactic antibiotic 1 hour before the appointment.  You will also need to call for an antibiotic prior to any other invasive test, procedure, or surgery.  These prophylactic antibiotics will be needed for the rest of your life, in order to prevent infections.  Please call your surgeons office at [phone] to request the antibiotic.      PHYSICAL THERAPY    Following surgery it is important to progress through recovery with in-home or outpatient physical therapy.  You should continue to complete home exercises provided from the hospital on days that you are not working with a physical therapist.    It is common to have a temporary increase in pain and swelling upon starting outpatient physical therapy and/or changing  your exercise routine.  Continue to use ice to help with symptoms.     FOLLOW-UP APPOINTMENT - [phone]    Your post-surgical appointments will take place approximately 2 weeks, 6 weeks, 3 months and 1 year following surgery.  Joint replacements are monitored thereafter every 2-5 years for life.  If you have any questions or need to make changes to this appointment, please contact the office:    EMERGENCIES & WHEN TO CALL YOUR SURGEON  When to contact our office immediately:  Any falls or injury to the joint replacement  Fever >101.5 for at least 48 hours after surgery or chills.  Excessive bleeding from incision(s). A small amount of drainage is normal and expected.  Signs of infection of incision(s)-excessive drainage that is soaking through your dressing (especially if it is pus-like), redness that is spreading out from the edges of your incision, or increased warmth around the area.  Excruciating pain for which the pain medication, taken as instructed, is not helping.  Severe calf pain.  Go directly to the emergency room or call 911, if you are experiencing chest pain or difficulty breathing.              ICE/COLD THERAPY  Ice is most important during the first 2 weeks after surgery, but should be used for several weeks as needed.  Never place ice, or cold therapy devices directly on the skin.  You should always have a protective layer between your skin and the cold.  You have been prescribed to ice your total joint at a minimum of twice per hour for 20 minutes while awake during the first 6 weeks after surgery if you are using ice packs. This will help with pain control.  If you are using an ice machine, please follow ice machine instructions.  After knee replacement is extremely important to elevate the leg straight on an incline, not flat.    COLD THERAPY MACHINE RECOMMENDATIONS      Cold therapy devices can be used before and after surgery to assist in comfort and help to reduce pain and swelling.  These  devices differ from ice or ice packs whereas the mechanism circulates water through tubing and a pad to provide longer periods of cold therapy to the desired site.  While in the hospital, you can use your cold devices around the clock for optimal comfort.  We recommend using cold therapy after working with therapy or completing exercises on your own.  Once you are discharged home, there is no set schedule in which you must follow while using cold therapy.  Below are a few points to remember when using a cold therapy device:    Read the 's instructions prior to first the use.  Follow instructions for filling the cooler (water first, then ice).  Always make sure there is a layer of protection between the cold pad and your skin (Clothing, Towel, Ace Bandage, etc.)  Allow the device to circulate cold water throughout the pad prior wrapping the pad around your leg (approximately 10 minutes).  Place the pad on your leg in the desired position to meet your pain management needs and use the wraps provided to secure the pad to your body.  The purpose of this device is to use consistently throughout the day.  You do not need to need to use the 20 on, 20 off method when using an ice machine.  During waking hours, remove the cold pad every 1-2 hours to perform a skin check  Detach the pad from the cooler and ambulate at least once every hour  After removing the pad, allow at least 30 minutes before resuming cold therapy  You may wear the cold therapy device during periods of sleep including overnight    If you wake up during the night, you can check the skin at this time.  You do not need to wake up specifically to perform skin checks.  Empty the cooler and pad when device is not in use.  Follow 's instructions for cleaning your cold therapy device.     DME can assist with problems related to products purchased through the hospital  [phone] - Naye   or   [phone] - Shanice    Sample Medication  Schedule  [facility]    Medication Refills - [phone] - Monday through Friday 8:30am-4:30pm  Please allow 3-5 days for medication refill requests to be processed.

## 2024-08-08 ENCOUNTER — ANESTHESIA EVENT (OUTPATIENT)
Dept: OPERATING ROOM | Facility: HOSPITAL | Age: 64
End: 2024-08-08
Payer: MEDICARE

## 2024-08-08 ENCOUNTER — TELEPHONE (OUTPATIENT)
Dept: INPATIENT UNIT | Facility: HOSPITAL | Age: 64
End: 2024-08-08
Payer: MEDICARE

## 2024-08-09 ENCOUNTER — TELEPHONE (OUTPATIENT)
Dept: INPATIENT UNIT | Facility: HOSPITAL | Age: 64
End: 2024-08-09
Payer: MEDICARE

## 2024-08-09 PROCEDURE — RXMED WILLOW AMBULATORY MEDICATION CHARGE

## 2024-08-11 ENCOUNTER — HOME HEALTH ADMISSION (OUTPATIENT)
Dept: HOME HEALTH SERVICES | Facility: HOME HEALTH | Age: 64
End: 2024-08-11
Payer: MEDICARE

## 2024-08-12 ENCOUNTER — APPOINTMENT (OUTPATIENT)
Dept: RADIOLOGY | Facility: HOSPITAL | Age: 64
End: 2024-08-12
Payer: MEDICARE

## 2024-08-12 ENCOUNTER — DOCUMENTATION (OUTPATIENT)
Dept: HOME HEALTH SERVICES | Facility: HOME HEALTH | Age: 64
End: 2024-08-12
Payer: MEDICARE

## 2024-08-12 ENCOUNTER — ANESTHESIA (OUTPATIENT)
Dept: OPERATING ROOM | Facility: HOSPITAL | Age: 64
End: 2024-08-12
Payer: MEDICARE

## 2024-08-12 ENCOUNTER — PHARMACY VISIT (OUTPATIENT)
Dept: PHARMACY | Facility: CLINIC | Age: 64
End: 2024-08-12
Payer: COMMERCIAL

## 2024-08-12 ENCOUNTER — HOSPITAL ENCOUNTER (OUTPATIENT)
Facility: HOSPITAL | Age: 64
Setting detail: OUTPATIENT SURGERY
Discharge: HOME HEALTH | End: 2024-08-12
Attending: ORTHOPAEDIC SURGERY | Admitting: ORTHOPAEDIC SURGERY
Payer: MEDICARE

## 2024-08-12 VITALS
TEMPERATURE: 96.8 F | HEIGHT: 70 IN | HEART RATE: 58 BPM | BODY MASS INDEX: 29.67 KG/M2 | DIASTOLIC BLOOD PRESSURE: 73 MMHG | OXYGEN SATURATION: 98 % | SYSTOLIC BLOOD PRESSURE: 146 MMHG | RESPIRATION RATE: 16 BRPM | WEIGHT: 207.23 LBS

## 2024-08-12 DIAGNOSIS — M17.12 PRIMARY OSTEOARTHRITIS OF LEFT KNEE: Primary | ICD-10-CM

## 2024-08-12 PROCEDURE — 2500000004 HC RX 250 GENERAL PHARMACY W/ HCPCS (ALT 636 FOR OP/ED): Performed by: NURSE ANESTHETIST, CERTIFIED REGISTERED

## 2024-08-12 PROCEDURE — 2720000007 HC OR 272 NO HCPCS: Performed by: ORTHOPAEDIC SURGERY

## 2024-08-12 PROCEDURE — 73560 X-RAY EXAM OF KNEE 1 OR 2: CPT | Mod: LT

## 2024-08-12 PROCEDURE — 3700000001 HC GENERAL ANESTHESIA TIME - INITIAL BASE CHARGE: Performed by: ORTHOPAEDIC SURGERY

## 2024-08-12 PROCEDURE — 7100000001 HC RECOVERY ROOM TIME - INITIAL BASE CHARGE: Performed by: ORTHOPAEDIC SURGERY

## 2024-08-12 PROCEDURE — 3600000017 HC OR TIME - EACH INCREMENTAL 1 MINUTE - PROCEDURE LEVEL SIX: Performed by: ORTHOPAEDIC SURGERY

## 2024-08-12 PROCEDURE — 97110 THERAPEUTIC EXERCISES: CPT | Mod: GP

## 2024-08-12 PROCEDURE — 3700000002 HC GENERAL ANESTHESIA TIME - EACH INCREMENTAL 1 MINUTE: Performed by: ORTHOPAEDIC SURGERY

## 2024-08-12 PROCEDURE — C1713 ANCHOR/SCREW BN/BN,TIS/BN: HCPCS | Performed by: ORTHOPAEDIC SURGERY

## 2024-08-12 PROCEDURE — 7100000009 HC PHASE TWO TIME - INITIAL BASE CHARGE: Performed by: ORTHOPAEDIC SURGERY

## 2024-08-12 PROCEDURE — 2500000004 HC RX 250 GENERAL PHARMACY W/ HCPCS (ALT 636 FOR OP/ED): Performed by: ANESTHESIOLOGY

## 2024-08-12 PROCEDURE — A27447 PR TOTAL KNEE ARTHROPLASTY: Performed by: ANESTHESIOLOGY

## 2024-08-12 PROCEDURE — A6213 FOAM DRG >16<=48 SQ IN W/BDR: HCPCS | Performed by: ORTHOPAEDIC SURGERY

## 2024-08-12 PROCEDURE — 7100000010 HC PHASE TWO TIME - EACH INCREMENTAL 1 MINUTE: Performed by: ORTHOPAEDIC SURGERY

## 2024-08-12 PROCEDURE — 2780000003 HC OR 278 NO HCPCS: Performed by: ORTHOPAEDIC SURGERY

## 2024-08-12 PROCEDURE — 97161 PT EVAL LOW COMPLEX 20 MIN: CPT | Mod: GP

## 2024-08-12 PROCEDURE — 3600000018 HC OR TIME - INITIAL BASE CHARGE - PROCEDURE LEVEL SIX: Performed by: ORTHOPAEDIC SURGERY

## 2024-08-12 PROCEDURE — 2500000005 HC RX 250 GENERAL PHARMACY W/O HCPCS: Performed by: ORTHOPAEDIC SURGERY

## 2024-08-12 PROCEDURE — RXMED WILLOW AMBULATORY MEDICATION CHARGE

## 2024-08-12 PROCEDURE — 64447 NJX AA&/STRD FEMORAL NRV IMG: CPT | Performed by: ANESTHESIOLOGY

## 2024-08-12 PROCEDURE — 2500000005 HC RX 250 GENERAL PHARMACY W/O HCPCS: Performed by: NURSE PRACTITIONER

## 2024-08-12 PROCEDURE — 2500000001 HC RX 250 WO HCPCS SELF ADMINISTERED DRUGS (ALT 637 FOR MEDICARE OP): Performed by: NURSE PRACTITIONER

## 2024-08-12 PROCEDURE — 73560 X-RAY EXAM OF KNEE 1 OR 2: CPT | Mod: LEFT SIDE | Performed by: RADIOLOGY

## 2024-08-12 PROCEDURE — A27447 PR TOTAL KNEE ARTHROPLASTY: Performed by: NURSE ANESTHETIST, CERTIFIED REGISTERED

## 2024-08-12 PROCEDURE — 7100000002 HC RECOVERY ROOM TIME - EACH INCREMENTAL 1 MINUTE: Performed by: ORTHOPAEDIC SURGERY

## 2024-08-12 PROCEDURE — A9999 DME SUPPLY OR ACCESSORY, NOS: HCPCS | Performed by: ORTHOPAEDIC SURGERY

## 2024-08-12 PROCEDURE — 2500000004 HC RX 250 GENERAL PHARMACY W/ HCPCS (ALT 636 FOR OP/ED): Performed by: ORTHOPAEDIC SURGERY

## 2024-08-12 PROCEDURE — C1776 JOINT DEVICE (IMPLANTABLE): HCPCS | Performed by: ORTHOPAEDIC SURGERY

## 2024-08-12 DEVICE — ATTUNE KNEE SYSTEM TIBIAL INSERT ROTATING PLATFORM POSTERIOR STABILIZED 8 5MM AOX
Type: IMPLANTABLE DEVICE | Site: KNEE | Status: FUNCTIONAL
Brand: ATTUNE

## 2024-08-12 DEVICE — ATTUNE KNEE SYSTEM FEMORAL POSTERIOR STABILIZED SIZE 8 LEFT CEMENTED
Type: IMPLANTABLE DEVICE | Site: KNEE | Status: FUNCTIONAL
Brand: ATTUNE

## 2024-08-12 DEVICE — ATTUNE PATELLA MEDIALIZED DOME 41MM CEMENTED AOX
Type: IMPLANTABLE DEVICE | Site: KNEE | Status: FUNCTIONAL
Brand: ATTUNE

## 2024-08-12 DEVICE — BONE CEMENT, SMART SET, HIGH VISCOSITY, 40GM: Type: IMPLANTABLE DEVICE | Site: KNEE | Status: FUNCTIONAL

## 2024-08-12 DEVICE — ATTUNE KNEE SYSTEM TIBIAL BASE ROTATING PLATFORM SIZE 8 CEMENTED
Type: IMPLANTABLE DEVICE | Site: KNEE | Status: FUNCTIONAL
Brand: ATTUNE

## 2024-08-12 RX ORDER — ALBUTEROL SULFATE 0.83 MG/ML
2.5 SOLUTION RESPIRATORY (INHALATION) ONCE AS NEEDED
Status: DISCONTINUED | OUTPATIENT
Start: 2024-08-12 | End: 2024-08-12 | Stop reason: HOSPADM

## 2024-08-12 RX ORDER — BUPIVACAINE HYDROCHLORIDE 7.5 MG/ML
INJECTION, SOLUTION INTRASPINAL AS NEEDED
Status: DISCONTINUED | OUTPATIENT
Start: 2024-08-12 | End: 2024-08-12

## 2024-08-12 RX ORDER — MIDAZOLAM HYDROCHLORIDE 1 MG/ML
INJECTION, SOLUTION INTRAMUSCULAR; INTRAVENOUS AS NEEDED
Status: DISCONTINUED | OUTPATIENT
Start: 2024-08-12 | End: 2024-08-12

## 2024-08-12 RX ORDER — ONDANSETRON HYDROCHLORIDE 2 MG/ML
INJECTION, SOLUTION INTRAVENOUS AS NEEDED
Status: DISCONTINUED | OUTPATIENT
Start: 2024-08-12 | End: 2024-08-12

## 2024-08-12 RX ORDER — TRANEXAMIC ACID 100 MG/ML
1000 INJECTION, SOLUTION INTRAVENOUS ONCE
Status: DISCONTINUED | OUTPATIENT
Start: 2024-08-12 | End: 2024-08-13 | Stop reason: HOSPADM

## 2024-08-12 RX ORDER — ACETAMINOPHEN 325 MG/1
975 TABLET ORAL ONCE
Status: COMPLETED | OUTPATIENT
Start: 2024-08-12 | End: 2024-08-12

## 2024-08-12 RX ORDER — FENTANYL CITRATE 50 UG/ML
INJECTION, SOLUTION INTRAMUSCULAR; INTRAVENOUS AS NEEDED
Status: DISCONTINUED | OUTPATIENT
Start: 2024-08-12 | End: 2024-08-12

## 2024-08-12 RX ORDER — ONDANSETRON HYDROCHLORIDE 2 MG/ML
8 INJECTION, SOLUTION INTRAVENOUS ONCE
Status: COMPLETED | OUTPATIENT
Start: 2024-08-12 | End: 2024-08-12

## 2024-08-12 RX ORDER — CELECOXIB 400 MG/1
400 CAPSULE ORAL ONCE
Status: COMPLETED | OUTPATIENT
Start: 2024-08-12 | End: 2024-08-12

## 2024-08-12 RX ORDER — SODIUM CHLORIDE, SODIUM LACTATE, POTASSIUM CHLORIDE, CALCIUM CHLORIDE 600; 310; 30; 20 MG/100ML; MG/100ML; MG/100ML; MG/100ML
100 INJECTION, SOLUTION INTRAVENOUS CONTINUOUS
Status: DISCONTINUED | OUTPATIENT
Start: 2024-08-12 | End: 2024-08-13 | Stop reason: HOSPADM

## 2024-08-12 RX ORDER — CEFAZOLIN 1 G/1
INJECTION, POWDER, FOR SOLUTION INTRAVENOUS AS NEEDED
Status: DISCONTINUED | OUTPATIENT
Start: 2024-08-12 | End: 2024-08-12

## 2024-08-12 RX ORDER — ACETAMINOPHEN 325 MG/1
650 TABLET ORAL EVERY 4 HOURS PRN
Status: DISCONTINUED | OUTPATIENT
Start: 2024-08-12 | End: 2024-08-12 | Stop reason: HOSPADM

## 2024-08-12 RX ORDER — TRANEXAMIC ACID 10 MG/ML
INJECTION, SOLUTION INTRAVENOUS AS NEEDED
Status: DISCONTINUED | OUTPATIENT
Start: 2024-08-12 | End: 2024-08-12

## 2024-08-12 RX ORDER — PROPOFOL 10 MG/ML
INJECTION, EMULSION INTRAVENOUS CONTINUOUS PRN
Status: DISCONTINUED | OUTPATIENT
Start: 2024-08-12 | End: 2024-08-12

## 2024-08-12 RX ORDER — SODIUM CHLORIDE 0.9 G/100ML
IRRIGANT IRRIGATION AS NEEDED
Status: DISCONTINUED | OUTPATIENT
Start: 2024-08-12 | End: 2024-08-12 | Stop reason: HOSPADM

## 2024-08-12 RX ORDER — CEFAZOLIN SODIUM 2 G/100ML
2 INJECTION, SOLUTION INTRAVENOUS ONCE
Status: DISCONTINUED | OUTPATIENT
Start: 2024-08-12 | End: 2024-08-13 | Stop reason: HOSPADM

## 2024-08-12 RX ORDER — MIDAZOLAM HYDROCHLORIDE 1 MG/ML
INJECTION INTRAMUSCULAR; INTRAVENOUS AS NEEDED
Status: DISCONTINUED | OUTPATIENT
Start: 2024-08-12 | End: 2024-08-12

## 2024-08-12 SDOH — HEALTH STABILITY: MENTAL HEALTH: CURRENT SMOKER: 0

## 2024-08-12 ASSESSMENT — PAIN - FUNCTIONAL ASSESSMENT
PAIN_FUNCTIONAL_ASSESSMENT: 0-10

## 2024-08-12 ASSESSMENT — ACTIVITIES OF DAILY LIVING (ADL)
ADL_ASSISTANCE: INDEPENDENT
ADLS_ADDRESSED: YES

## 2024-08-12 ASSESSMENT — PAIN SCALES - GENERAL
PAINLEVEL_OUTOF10: 0 - NO PAIN
PAINLEVEL_OUTOF10: 2
PAINLEVEL_OUTOF10: 0 - NO PAIN

## 2024-08-12 ASSESSMENT — COGNITIVE AND FUNCTIONAL STATUS - GENERAL
MOBILITY SCORE: 20
WALKING IN HOSPITAL ROOM: A LITTLE
STANDING UP FROM CHAIR USING ARMS: A LITTLE
MOVING TO AND FROM BED TO CHAIR: A LITTLE
CLIMB 3 TO 5 STEPS WITH RAILING: A LITTLE

## 2024-08-12 NOTE — HH CARE COORDINATION
Home Care received a Referral for Physical Therapy. We have processed the referral for a Start of Care on 08/13.     If you have any questions or concerns, please feel free to contact us at [phone]. Follow the prompts, enter your five digit zip code, and you will be directed to your care team on [facility].

## 2024-08-12 NOTE — ANESTHESIA PROCEDURE NOTES
Spinal Block    Patient location during procedure: OR  Start time: 8/12/2024 11:44 AM  End time: 8/12/2024 11:48 AM  Reason for block: primary anesthetic and at surgeon's request  Staffing  Performed: CRNA   Authorized by: Girish Rai MD    Performed by: SHERENE Jaimes-CRNA    Preanesthetic Checklist  Completed: patient identified, IV checked, site marked, risks and benefits discussed, surgical consent, monitors and equipment checked, pre-op evaluation, timeout performed and sterile techniques followed  Block Timeout  RN/Licensed healthcare professional reads aloud to the Anesthesia provider and entire team: Patient identity, procedure with side and site, patient position, and as applicable the availability of implants/special equipment/special requirements.  Patient on coagulant treatment: no  Timeout performed at: 8/12/2024 11:46 AM  Spinal Block  Patient position: sitting  Prep: Betadine  Sterility prep: cap, drape, gloves, gown, hand hygiene and mask  Sedation level: moderate sedation  Patient monitoring: continuous pulse oximetry  Approach: midline  Vertebral space: L3-4  Injection technique: single-shot  Needle  Needle type: pencil-point   Needle gauge: 24 G  Needle length: 4 in  Free flowing CSF: yes    Assessment  Sensory level: T10  Block outcome: patient comfortable  Procedure assessment: patient sedated but conversant throughout procedure and patient tolerated procedure well with no immediate complications
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
## 2024-08-12 NOTE — ANESTHESIA PROCEDURE NOTES
Peripheral Block    Patient location during procedure: pre-op  Reason for block: at surgeon's request and post-op pain management  Staffing  Performed: attending   Authorized by: Girish Rai MD    Performed by: Girish Rai MD  Preanesthetic Checklist  Completed: patient identified, IV checked, site marked, risks and benefits discussed, surgical consent, monitors and equipment checked, pre-op evaluation and timeout performed   Timeout performed at:   Peripheral Block  Patient position: laying flat  Prep: ChloraPrep and site prepped and draped  Patient monitoring: heart rate and continuous pulse ox  Block type: adductor canal  Laterality: left  Injection technique: single-shot  Guidance: ultrasound guided  Needle  Needle type: short-bevel   Needle gauge: 22 G  Needle length: 8 cm  Needle localization: anatomical landmarks, ultrasound guidance and nerve stimulator  Assessment  Injection assessment: negative aspiration for heme, no paresthesia on injection, incremental injection and local visualized surrounding nerve on ultrasound  Paresthesia pain: none  Heart rate change: no  Slow fractionated injection: yes  Additional Notes  30mL 0.5% marcaine 1:300k epi 5mg decadron

## 2024-08-12 NOTE — OP NOTE
LEFT TOTAL KNEE ARTHROPLASTY     Date: 2024   OR Location: [facility] OR    Name: Bj Prado  : 1960    MRN: [id number]    Diagnosis  Pre-op Diagnosis      * Primary osteoarthritis of left knee [M17.12]  Post-op Diagnosis     * Primary osteoarthritis of left knee [M17.12]      Procedures  OPEN TOTAL KNEE ARTHROPLASTY  09629 - MO ARTHRP KNE CONDYLE&PLATU MEDIAL&LAT COMPARTMENTS      Surgeons      * Clif Zelaya - Primary     Specimen: none.    Staff: Circulator: Cynthia Currie RN  Scrub Person: Rosalinda De La Rosa RN  RNFA: Jabari Randhawa RN     Drains and/or Catheters: none    Estimated Blood Loss: 50 cc    Resident/Fellow/Other Assistant:  Surgeons and Role:  * No surgeons found with a matching role *     Procedure Summary  Anesthesia: Consult    Anesthesia Staff: Anesthesiologist: Girish Rai MD  CRNA: SHEREEN Jaimes-CRNA   ASA: III       Intra-op Medications:   - 1 Gram Tranexamic acid prior to surgical incision  - 1 Gram Tranexamic acid at start of incision close  - NISHANT Pain cockail: ropivacaine-epinephrine-clonidine-ketorolac 2.46-0.005- 0.0008-0.3mg/mL periarticular syringe: 50 cc    IMPLANTS:   Implant Name Type Inv. Item Serial No.  Lot No. LRB No. Used Action   BONE CEMENT, SMART SET, HIGH VISCOSITY, 40GM - [id number] Joint Knee BONE CEMENT, SMART SET, HIGH VISCOSITY, 40GM  DEPUY [id number] Left 1 Implanted   FEMORAL, ATTUNE PS, LOGAN, SZ 8, LT - [id number] Joint Knee FEMORAL, ATTUNE PS, LOGAN, SZ 8, LT  DEPUY [id number] Left 1 Implanted   TIBAL BASE, ATTUNE, ROTATING PLATFORM, LOGAN, SZ 8 - [id number] Joint Knee TIBAL BASE, ATTUNE, ROTATING PLATFORM, LOGAN, SZ 8  DEPUY [id number] Left 1 Implanted   DOME, PATELLA, MEDIALIZED, 41MM - [id number] Joint Knee DOME, PATELLA, MEDIALIZED, 41MM  DEPUY [id number] Left 1 Implanted   INSERT, ATTUNE PS RP, SZ 8, 5MM - [id number] Joint Knee INSERT, ATTUNE PS RP, SZ 8, 5MM  DEPUY [id number] Left 1 Implanted       Findings: See operative  note    Operative Indications:  Bj Prado is a patient that is well-known to me and initially presented as an outpatient. They have been treated for advanced knee osteoarthritis with therapy, anti-inflammatories, and activity modification, all without improvement of symptoms. They are here for surgery for Pre-op Diagnosis      * Primary osteoarthritis of left knee [M17.12].     We therefore reviewed the alternative option of elective total knee arthroplasty. The risks and benefits of this procedure were discussed in detail as an outpatient and are documented in our outpatient record. The patient expressed full understanding and wished to proceed. Informed consent was obtained and was placed on the medical chart    The risks, benefits and potential complications of the arthroplasty surgery were discussed with the patient in detail.  Risks including but not limited to the risk of anesthesia, DVT, pulmonary embolism with the possibility of death, retained infection, and neurovascular injury.  Specific details of the procedure, hospitalization, recovery, rehabilitation, and long-term precautions were also provided. Pre-operative teaching was provided. Implant/prosthesis selection was outlined, and the many options available were explained; the final choice will be made at the time of the procedure to match the anatomy and condition of the bone, ligaments, tendons, and muscles. Understanding of all topics was conveyed to me by the patient, and consent was given to proceed with a total knee arthroplasty.     On the day of surgery the site of surgery was properly noted/marked if necessary per policy. The patient has been actively warmed in preoperative area. Preoperative antibiotics were confirmed and started prior to surgical incision. Venous thrombosis prophylaxis have been ordered including bilateral sequential compression devices to start in pre-operative area.     At the time of surgery there was found severe OA  with valgus defority.    Procedure:     Patient was brought to the operative suite where satisfactory spinal anesthetic was administered by department anesthesia and an adductor canal block.  Patient was placed in supine position on the operative table.  The left lower extremity was sterilely draped and prepped in routine surgical fashion.  A tourniquet was applied in the upper one third of the patient's left thigh.  The left lower was exsanguinated free of blood tourniquet was inflated to 325 mmHg.  Landmarks were identified.  An incision was carried out over the anterior medial aspect of the patient's left knee extending approximately 12 cm in length in curvilinear fashion.  A try vector quad sparing incision was created.  The patella was everted 90 degrees fat pad was excised.    The patella was measured and found to be 27 mm thick.  The arthritic articular cartilage was resected from the patella using a sagittal saw.  A size 41 trial patella was placed.  The height was measured found to be 27 mm thick.  A protective plate was then placed on the cut surface of the patella and the knee was flexed.  Osteophytes removed with rongeur ACL and PCL were sacrificed along the medial lateral meniscus.    A  hole was created into the distal femur for intramedullary instrumentation.  The distal femoral cut was performed.  The femur was sized and found be a size 8.  A size 8 cutting block was pinned to the distal femur and appropriate cuts were carried out using a sagittal saw.  The notch cut was performed using a reciprocating saw.  Attention was then directed to the tibia where a  hole was created for intramedullary instrumentation.  Cutting block assembly was applied over the guide yosi.  Proximal tibia cut was performed using a sagittal saw the tibia was sized and found to be a size 8.  Trial implants were inserted using a size 8 femur size 8 tibia 5 mm spacer and a 41 patella.  There was found to be excellent  range of motion and stability.  Satisfactory tracking of the patellofemoral joint was appreciated.    Trial components were removed the proximal tibia was finished using a drill and broach.  Bony ends were lavaged and dried bone cement was mixed.  A size 8 tibial baseplate was cemented into position a size 8 posterior stabilized femoral component was cemented into position.  A size 41 patella was cemented into position.  Excess bone cement was removed.  A size 5 rotating platform polyethylene was placed on the tibial baseplate and the knee was reduced.  The knee was taken through full range of motion found of excellent motion and stability.    The pericapsular tissues and subcutaneous tissues were injected with ropivacaine, saline, Toradol, and epinephrine for postoperative pain control.    The subcutaneous tissues were injected with ropivacaine, Toradol, and saline.  Tourniquet was released good blood flow was noted to return to the patient's left lower extremity.  Layer closure was carried out using a running lock #1 undyed Vicryl for the deep fascia.  Interrupted #2 FiberWire sutures in a barbed #2 suture was also used.  A 2-0 undyed Vicryl for subcutaneous tissues.  A subcuticular closure with 3 oh V-Loc followed by Dermabond Steri-Strips and a Mepilex silver dressing was applied.  KALEN hose and Ace bandage was applied.  Patient was transferred to the PACU having tolerated the procedure well.                                  Weight Bearing Status:  WBAT Bilateral LE                        Range of Motion: As tolerated                               Dressing: Maintain for one week                        Discharge/Follow-up: Follow up in clinic in two weeks    Clif Zelaya D.O.      This note was dictated using speech recognition software and was not corrected for spelling or grammatical errors

## 2024-08-12 NOTE — ANESTHESIA PROCEDURE NOTES
Spinal Block    Patient location during procedure: OR  Start time: 8/12/2024 11:44 AM  End time: 8/12/2024 11:48 AM  Reason for block: primary anesthetic and at surgeon's request  Staffing  Performed: CRNA   Authorized by: Girish Rai MD    Performed by: SHEREEN Jaimes-CRNA    Preanesthetic Checklist  Completed: patient identified, IV checked, site marked, risks and benefits discussed, surgical consent, monitors and equipment checked, pre-op evaluation, timeout performed and sterile techniques followed  Block Timeout  RN/Licensed healthcare professional reads aloud to the Anesthesia provider and entire team: Patient identity, procedure with side and site, patient position, and as applicable the availability of implants/special equipment/special requirements.  Patient on coagulant treatment: no  Timeout performed at: 8/12/2024 11:46 AM  Spinal Block  Patient position: sitting  Prep: Betadine  Sterility prep: cap, drape, gloves, gown, hand hygiene and mask  Sedation level: moderate sedation  Patient monitoring: continuous pulse oximetry  Approach: midline  Vertebral space: L3-4  Injection technique: single-shot  Needle  Needle type: pencil-point   Needle gauge: 24 G  Needle length: 4 in  Free flowing CSF: yes    Assessment  Sensory level: T10  Block outcome: patient comfortable  Procedure assessment: patient sedated but conversant throughout procedure and patient tolerated procedure well with no immediate complications

## 2024-08-12 NOTE — ANESTHESIA POSTPROCEDURE EVALUATION
"Patient: Bj Prado \"Gian\"    Procedure Summary       Date: 08/12/24 Room / Location: [facility] OR 02 / [facility]    Anesthesia Start: 1147 Anesthesia Stop: 1338    Procedure: OPEN TOTAL KNEE ARTHROPLASTY (Left: Knee) Diagnosis:       Primary osteoarthritis of left knee      (Primary osteoarthritis of left knee [M17.12])    Surgeons: Clif Zelaya DO Responsible Provider: Girish Rai MD    Anesthesia Type: regional, spinal ASA Status: 3            Anesthesia Type: regional, spinal    Vitals Value Taken Time   /51 08/12/24 1400   Temp 36 °C (96.8 °F) 08/12/24 1345   Pulse 52 08/12/24 1400   Resp 15 08/12/24 1400   SpO2 98 % 08/12/24 1400       Anesthesia Post Evaluation    Patient location during evaluation: PACU  Patient participation: complete - patient participated  Level of consciousness: awake  Pain management: adequate  Multimodal analgesia pain management approach  Airway patency: patent  Two or more strategies used to mitigate risk of obstructive sleep apnea  Cardiovascular status: acceptable  Respiratory status: acceptable  Hydration status: acceptable  Postoperative Nausea and Vomiting: none        No notable events documented.    "

## 2024-08-12 NOTE — PROGRESS NOTES
Physical Therapy    Physical Therapy Evaluation & Treatment    Patient Name: Gian Prado  MRN: [id number]  Today's Date: 8/12/2024   Time Calculation  Start Time: 1548  Stop Time: 1618  Time Calculation (min): 30 min    Assessment/Plan   PT Assessment  PT Assessment Results: Decreased strength, Impaired balance, Decreased mobility  Rehab Prognosis: Excellent  Barriers to Discharge: None  Evaluation/Treatment Tolerance: Patient tolerated treatment well  Medical Staff Made Aware: Yes  Strengths: Ability to acquire knowledge, Housing layout, Insight into problems, Support of Caregivers  Barriers to Participation:  (None)  End of Session Communication: Bedside nurse  Assessment Comment: Patient tolerates mobility well, strong support system, good understanding of rehab expectations. Rec low intensity PT intervention.  End of Session Patient Position: Up in chair (prepared for d/c)      PT Plan  Treatment/Interventions: Transfer training, Gait training, Stair training, Balance training, Strengthening, Endurance training, Range of motion, Therapeutic activity, Therapeutic exercise  PT Plan: Ongoing PT  PT Eval Only Reason: Safe to return home  PT Frequency: 2 times per week  PT Discharge Recommendations: Low intensity level of continued care  Equipment Recommended upon Discharge: Wheeled walker  PT Recommended Transfer Status: Assist x1  PT - OK to Discharge: Yes (per PT POC)      Subjective     General Visit Information:  General  Reason for Referral: Impaired functional mobility; L TKA  Referred By: Clif Zelaya  Past Medical History Relevant to Rehab: HTN, OA of left knee, GERD  Family/Caregiver Present: Yes (SO and dtr)  Prior to Session Communication: Bedside nurse  Patient Position Received: Bed, 3 rail up (sitting at EOB)  General Comment: Patient pleasant, cooperative and agreeable to PT eval  Home Living:  Home Living  Type of Home: House  Lives With: Significant other  Home Adaptive Equipment: Walker rolling  or standard, Cane  Home Layout: One level  Home Access: Stairs to enter without rails  Entrance Stairs-Rails: None  Entrance Stairs-Number of Steps: 2  Bathroom Shower/Tub: Walk-in shower  Prior Level of Function:  Prior Function Per Pt/Caregiver Report  Level of [place]: Independent with ADLs and functional transfers, Independent with homemaking with ambulation  ADL Assistance: Independent  Homemaking Assistance: Independent  Ambulatory Assistance: Independent  Prior Function Comments: (+) driving  Precautions:  Precautions  LE Weight Bearing Status: Weight Bearing as Tolerated  Post-Surgical Precautions: Left total knee precautions  Precautions Comment: Patient with ACE wrap L LE    Objective   Pain:  Pain Assessment  Pain Assessment: 0-10  0-10 (Numeric) Pain Score: 2  Pain Type: Surgical pain  Pain Location: Knee  Pain Orientation: Left  Pain Interventions: Repositioned  Cognition:  Cognition  Overall Cognitive Status: Within Functional Limits  Orientation Level: Oriented X4    General Assessments:     Activity Tolerance  Endurance: Endurance does not limit participation in activity    Sensation  Light Touch: No apparent deficits    Strength  Strength Comments: R LE 5/5, L hip 5/5, L knee 3+/5, L ankle 5/5    Coordination  Movements are Fluid and Coordinated: Yes    Postural Control  Postural Control: Within Functional Limits    Static Sitting Balance  Static Sitting-Balance Support: No upper extremity supported, Feet supported  Static Sitting-Level of Assistance: Independent    Static Standing Balance  Static Standing-Balance Support: Bilateral upper extremity supported  Static Standing-Level of Assistance: Close supervision  Functional Assessments:  ADL  ADL's Addressed: Yes (min A lower body dressing surgical side first, initated in sitting and completed in standing)    Bed Mobility  Bed Mobility: Yes  Bed Mobility 1  Bed Mobility 1: Supine to sitting, Sitting to supine  Level of Assistance 1:  Independent    Transfers  Transfer: Yes  Transfer 1  Transfer From 1: Sit to, Stand to  Transfer to 1: Sit, Stand  Technique 1: Sit to stand, Stand to sit  Transfer Device 1: Walker  Transfer Level of Assistance 1: Close supervision  Trials/Comments 1: completed x 4    Ambulation/Gait Training  Ambulation/Gait Training Performed: Yes  Ambulation/Gait Training 1  Surface 1: Level tile  Device 1: Rolling walker  Assistance 1: Close supervision  Quality of Gait 1:  (step through pattern, limited L knee flexion during swing, limited L heel strike in stance)  Comments/Distance (ft) 1: 15' x 3    Stairs  Stairs: Yes  Stairs  Device 1: Single point cane  Assistance 1: Contact guard  Comment/Number of Steps 1: 3 (step to pattern, verbal cues for sequencing)    Treatments:   Patient encouraged to complete supine ther ex 3x/day. Reviewed 15  reps each: ankle pumps, quad sets, SLR, hip abd/add, heel slides, SAQ. Patient advised home care will advance ther ex as tolerated   Encouraged to take short duration ambulation bouts hourly during waking hours with the use of 2WW, focusing on gait pattern.   Educated on the importance of avoiding remaining in one position for extended period of time. Encouraged pain and edema control with use of ice, elevation and activity pacing. Patient verbalized understanding.   Reiterated no pillows/towels etc under the knee   Reviewed TKA precautions   Home going packet reviewed and provided to patient. Patient verbalized understanding.     Reviewed car transfer, patient and SO verbalized understanding. Recommended pushing seat back as far as possible, don't use door as support, recline seat to provide increased space for hip mobility. Sit first and then swivel into vehicle. Plans to d/c into a [facility].     Bed Mobility  Bed Mobility: Yes  Bed Mobility 1  Bed Mobility 1: Supine to sitting, Sitting to supine  Level of Assistance 1: Independent    Ambulation/Gait Training  Ambulation/Gait  Training Performed: Yes  Ambulation/Gait Training 1  Surface 1: Level tile  Device 1: Rolling walker  Assistance 1: Close supervision  Quality of Gait 1:  (step through pattern, limited L knee flexion during swing, limited L heel strike in stance)  Comments/Distance (ft) 1: 15' x 3  Transfers  Transfer: Yes  Transfer 1  Transfer From 1: Sit to, Stand to  Transfer to 1: Sit, Stand  Technique 1: Sit to stand, Stand to sit  Transfer Device 1: Walker  Transfer Level of Assistance 1: Close supervision  Trials/Comments 1: completed x 4    Stairs  Stairs: Yes  Stairs  Device 1: Single point cane  Assistance 1: Contact guard  Comment/Number of Steps 1: 3 (step to pattern, verbal cues for sequencing)  Outcome Measures:  [facility] Basic Mobility  Turning from your back to your side while in a flat bed without using bedrails: None  Moving from lying on your back to sitting on the side of a flat bed without using bedrails: None  Moving to and from bed to chair (including a wheelchair): A little  Standing up from a chair using your arms (e.g. wheelchair or bedside chair): A little  To walk in hospital room: A little  Climbing 3-5 steps with railing: A little  Basic Mobility - Total Score: 20    Encounter Problems       Encounter Problems (Resolved)       Mobility       Patient will demonstrate good understanding of bed mobility, transfers, ambulation, stair negotiation and HEP for safe home going.   (Met)       Start:  08/12/24    Expected End:  08/12/24    Resolved:  08/12/24                Education Documentation  Handouts, taught by Roseline Cunningham PT at 8/12/2024  4:38 PM.  Learner: Family, Significant Other, Patient  Readiness: Acceptance  Method: Explanation, Demonstration, Handout  Response: Verbalizes Understanding, Demonstrated Understanding    Precautions, taught by Roseline Cunningham PT at 8/12/2024  4:38 PM.  Learner: Family, Significant Other, Patient  Readiness: Acceptance  Method: Explanation, Demonstration,  Handout  Response: Verbalizes Understanding, Demonstrated Understanding    Body Mechanics, taught by Roseline Cunningham PT at 8/12/2024  4:38 PM.  Learner: Family, Significant Other, Patient  Readiness: Acceptance  Method: Explanation, Demonstration, Handout  Response: Verbalizes Understanding, Demonstrated Understanding    Home Exercise Program, taught by Roseline Cunningham, PT at 8/12/2024  4:38 PM.  Learner: Family, Significant Other, Patient  Readiness: Acceptance  Method: Explanation, Demonstration, Handout  Response: Verbalizes Understanding, Demonstrated Understanding    Mobility Training, taught by Roseline Cunningham PT at 8/12/2024  4:38 PM.  Learner: Family, Significant Other, Patient  Readiness: Acceptance  Method: Explanation, Demonstration, Handout  Response: Verbalizes Understanding, Demonstrated Understanding    Education Comments  No comments found.

## 2024-08-13 ENCOUNTER — HOME CARE VISIT (OUTPATIENT)
Dept: HOME HEALTH SERVICES | Facility: HOME HEALTH | Age: 64
End: 2024-08-13
Payer: MEDICARE

## 2024-08-13 VITALS — RESPIRATION RATE: 18 BRPM | HEART RATE: 60 BPM | SYSTOLIC BLOOD PRESSURE: 148 MMHG | DIASTOLIC BLOOD PRESSURE: 86 MMHG

## 2024-08-13 PROCEDURE — G0151 HHCP-SERV OF PT,EA 15 MIN: HCPCS | Mod: HHH

## 2024-08-13 PROCEDURE — 169592 NO-PAY CLAIM PROCEDURE

## 2024-08-13 ASSESSMENT — ENCOUNTER SYMPTOMS
PAIN LOCATION: LEFT KNEE
PAIN LOCATION - PAIN QUALITY: ACHING
PAIN: 1
PAIN SEVERITY GOAL: 0/10
PAIN LOCATION - PAIN SEVERITY: 3/10
LOWEST PAIN SEVERITY IN PAST 24 HOURS: 3/10
HIGHEST PAIN SEVERITY IN PAST 24 HOURS: 6/10
PAIN LOCATION - RELIEVING FACTORS: MEDS, ICE
PERSON REPORTING PAIN: PATIENT
PAIN LOCATION - PAIN FREQUENCY: CONSTANT
PAIN LOCATION - PAIN DURATION: ACUTE
PAIN LOCATION - EXACERBATING FACTORS: ACITIVITY
SUBJECTIVE PAIN PROGRESSION: WAXING AND WANING

## 2024-08-14 ASSESSMENT — ACTIVITIES OF DAILY LIVING (ADL)
AMBULATION ASSISTANCE ON FLAT SURFACES: 1
ENTERING_EXITING_HOME: NEEDS ASSISTANCE
OASIS_M1830: 04
AMBULATION_DISTANCE/DURATION_TOLERATED: 20 FEET

## 2024-08-16 ENCOUNTER — HOME CARE VISIT (OUTPATIENT)
Dept: HOME HEALTH SERVICES | Facility: HOME HEALTH | Age: 64
End: 2024-08-16
Payer: MEDICARE

## 2024-08-16 VITALS
SYSTOLIC BLOOD PRESSURE: 132 MMHG | HEART RATE: 72 BPM | OXYGEN SATURATION: 100 % | RESPIRATION RATE: 14 BRPM | DIASTOLIC BLOOD PRESSURE: 80 MMHG | TEMPERATURE: 98.1 F

## 2024-08-16 PROCEDURE — G0157 HHC PT ASSISTANT EA 15: HCPCS | Mod: CQ,HHH

## 2024-08-16 ASSESSMENT — ENCOUNTER SYMPTOMS
PAIN LOCATION - PAIN SEVERITY: 3/10
PAIN LOCATION: LEFT KNEE
PAIN: 1
PERSON REPORTING PAIN: PATIENT

## 2024-08-17 ENCOUNTER — HOME CARE VISIT (OUTPATIENT)
Dept: HOME HEALTH SERVICES | Facility: HOME HEALTH | Age: 64
End: 2024-08-17
Payer: MEDICARE

## 2024-08-19 ENCOUNTER — HOME CARE VISIT (OUTPATIENT)
Dept: HOME HEALTH SERVICES | Facility: HOME HEALTH | Age: 64
End: 2024-08-19
Payer: MEDICARE

## 2024-08-19 VITALS
HEART RATE: 68 BPM | RESPIRATION RATE: 14 BRPM | DIASTOLIC BLOOD PRESSURE: 80 MMHG | OXYGEN SATURATION: 98 % | SYSTOLIC BLOOD PRESSURE: 122 MMHG | TEMPERATURE: 98 F

## 2024-08-19 PROCEDURE — G0157 HHC PT ASSISTANT EA 15: HCPCS | Mod: CQ,HHH

## 2024-08-19 ASSESSMENT — ENCOUNTER SYMPTOMS
PAIN LOCATION - PAIN FREQUENCY: FREQUENT
LOWEST PAIN SEVERITY IN PAST 24 HOURS: 1/10
HIGHEST PAIN SEVERITY IN PAST 24 HOURS: 5/10
PAIN LOCATION - PAIN SEVERITY: 1/10
PAIN LOCATION - PAIN DURATION: SINCE SURGERY
PAIN LOCATION - PAIN QUALITY: ACHING
PAIN LOCATION - RELIEVING FACTORS: ICE, OTC MEDS
PAIN SEVERITY GOAL: 0/10
PERSON REPORTING PAIN: PATIENT
PAIN LOCATION: LEFT KNEE
PAIN: 1

## 2024-08-22 ENCOUNTER — HOME CARE VISIT (OUTPATIENT)
Dept: HOME HEALTH SERVICES | Facility: HOME HEALTH | Age: 64
End: 2024-08-22
Payer: MEDICARE

## 2024-08-22 VITALS
DIASTOLIC BLOOD PRESSURE: 80 MMHG | TEMPERATURE: 97.7 F | SYSTOLIC BLOOD PRESSURE: 140 MMHG | HEART RATE: 79 BPM | RESPIRATION RATE: 14 BRPM

## 2024-08-22 PROCEDURE — G0157 HHC PT ASSISTANT EA 15: HCPCS | Mod: CQ,HHH

## 2024-08-22 ASSESSMENT — ENCOUNTER SYMPTOMS
PAIN SEVERITY GOAL: 0/10
HIGHEST PAIN SEVERITY IN PAST 24 HOURS: 5/10
LOWEST PAIN SEVERITY IN PAST 24 HOURS: 2/10
PAIN LOCATION - PAIN SEVERITY: 4/10
PAIN LOCATION - PAIN DURATION: SINCE SURGERY
PAIN: 1
PAIN LOCATION - PAIN FREQUENCY: FREQUENT
PAIN LOCATION - PAIN QUALITY: ACHING, THROBBING
PAIN LOCATION: LEFT KNEE
PAIN LOCATION - EXACERBATING FACTORS: ACTIVITY, TIME OF DAY
PERSON REPORTING PAIN: PATIENT

## 2024-08-26 ENCOUNTER — HOME CARE VISIT (OUTPATIENT)
Dept: HOME HEALTH SERVICES | Facility: HOME HEALTH | Age: 64
End: 2024-08-26
Payer: MEDICARE

## 2024-08-26 VITALS
RESPIRATION RATE: 14 BRPM | HEART RATE: 57 BPM | DIASTOLIC BLOOD PRESSURE: 72 MMHG | TEMPERATURE: 97.7 F | SYSTOLIC BLOOD PRESSURE: 120 MMHG

## 2024-08-26 PROCEDURE — G0157 HHC PT ASSISTANT EA 15: HCPCS | Mod: CQ,HHH

## 2024-08-26 ASSESSMENT — ENCOUNTER SYMPTOMS
PERSON REPORTING PAIN: PATIENT
PAIN: 1
PAIN LOCATION: LEFT KNEE
PAIN LOCATION - PAIN QUALITY: ACHING
PAIN LOCATION - PAIN SEVERITY: 3/10

## 2024-08-26 NOTE — CASE COMMUNICATION
Patient ready for agency dc this week. Starts outpatient therapy next week. Has post op follow up visit with surgeon tomorrow. NOMNC reviewed and signed electronically by patient with end date of 8/30/2024.    Mepilex dressing was removed in home today to left knee due to significant allergic reaction to skin. Call was placed to [facility]. Discussed issue with Dr Nathalie Araujo's Assistant and she gave verbal permission  to remove dressing a day early to help relieve patient discomfort due to rash.

## 2024-08-28 ENCOUNTER — HOME CARE VISIT (OUTPATIENT)
Dept: HOME HEALTH SERVICES | Facility: HOME HEALTH | Age: 64
End: 2024-08-28
Payer: MEDICARE

## 2024-08-28 ENCOUNTER — TELEPHONE (OUTPATIENT)
Dept: INPATIENT UNIT | Facility: HOSPITAL | Age: 64
End: 2024-08-28
Payer: MEDICARE

## 2024-08-28 PROCEDURE — G0151 HHCP-SERV OF PT,EA 15 MIN: HCPCS | Mod: HHH

## 2024-08-28 ASSESSMENT — ENCOUNTER SYMPTOMS
PAIN LOCATION - PAIN SEVERITY: 1/10
PAIN LOCATION - PAIN QUALITY: ACHING
PAIN: 1
PAIN LOCATION - RELIEVING FACTORS: MEDS, ICE
PAIN LOCATION - PAIN DURATION: ACUTE
SUBJECTIVE PAIN PROGRESSION: GRADUALLY IMPROVING
PAIN LOCATION: LEFT KNEE
PAIN SEVERITY GOAL: 0/10
HIGHEST PAIN SEVERITY IN PAST 24 HOURS: 4/10
PAIN LOCATION - PAIN FREQUENCY: INTERMITTENT
LOWEST PAIN SEVERITY IN PAST 24 HOURS: 0/10
PERSON REPORTING PAIN: PATIENT
PAIN LOCATION - EXACERBATING FACTORS: ACTIVITY

## 2024-08-28 ASSESSMENT — ACTIVITIES OF DAILY LIVING (ADL)
HOME_HEALTH_OASIS: 00
OASIS_M1830: 00

## 2024-10-23 ENCOUNTER — LAB REQUISITION (OUTPATIENT)
Dept: LAB | Facility: HOSPITAL | Age: 64
End: 2024-10-23
Payer: MEDICARE

## 2024-10-23 DIAGNOSIS — Z96.652 PRESENCE OF LEFT ARTIFICIAL KNEE JOINT: ICD-10-CM

## 2024-10-23 DIAGNOSIS — M25.562 PAIN IN LEFT KNEE: ICD-10-CM

## 2024-10-23 PROCEDURE — 87075 CULTR BACTERIA EXCEPT BLOOD: CPT

## 2024-10-23 PROCEDURE — 87070 CULTURE OTHR SPECIMN AEROBIC: CPT

## 2024-10-23 PROCEDURE — 87205 SMEAR GRAM STAIN: CPT

## 2024-10-26 LAB
BACTERIA SPEC CULT: NORMAL
GRAM STN SPEC: NORMAL
GRAM STN SPEC: NORMAL

## 2024-12-28 ENCOUNTER — HOSPITAL ENCOUNTER (INPATIENT)
Facility: HOSPITAL | Age: 64
DRG: 487 | End: 2024-12-28
Attending: EMERGENCY MEDICINE | Admitting: INTERNAL MEDICINE
Payer: MEDICARE

## 2024-12-28 ENCOUNTER — APPOINTMENT (OUTPATIENT)
Dept: RADIOLOGY | Facility: HOSPITAL | Age: 64
DRG: 487 | End: 2024-12-28
Payer: MEDICARE

## 2024-12-28 DIAGNOSIS — M00.9 SEPTIC ARTHRITIS (MULTI): ICD-10-CM

## 2024-12-28 DIAGNOSIS — M71.062 ABSCESS OF BURSA OF LEFT KNEE: ICD-10-CM

## 2024-12-28 DIAGNOSIS — M25.462 KNEE EFFUSION, LEFT: ICD-10-CM

## 2024-12-28 LAB
BASOPHILS # BLD AUTO: 0.05 X10*3/UL (ref 0–0.1)
BASOPHILS NFR BLD AUTO: 0.3 %
CRP SERPL-MCNC: 2.12 MG/DL
EOSINOPHIL # BLD AUTO: 0.01 X10*3/UL (ref 0–0.7)
EOSINOPHIL NFR BLD AUTO: 0.1 %
ERYTHROCYTE [DISTWIDTH] IN BLOOD BY AUTOMATED COUNT: 13.5 % (ref 11.5–14.5)
ERYTHROCYTE [SEDIMENTATION RATE] IN BLOOD BY WESTERGREN METHOD: 11 MM/H (ref 0–20)
FLUAV RNA RESP QL NAA+PROBE: NOT DETECTED
FLUBV RNA RESP QL NAA+PROBE: NOT DETECTED
HCT VFR BLD AUTO: 42.1 % (ref 41–52)
HGB BLD-MCNC: 14 G/DL (ref 13.5–17.5)
IMM GRANULOCYTES # BLD AUTO: 0.08 X10*3/UL (ref 0–0.7)
IMM GRANULOCYTES NFR BLD AUTO: 0.5 % (ref 0–0.9)
LYMPHOCYTES # BLD AUTO: 0.94 X10*3/UL (ref 1.2–4.8)
LYMPHOCYTES NFR BLD AUTO: 5.3 %
MCH RBC QN AUTO: 29.7 PG (ref 26–34)
MCHC RBC AUTO-ENTMCNC: 33.3 G/DL (ref 32–36)
MCV RBC AUTO: 89 FL (ref 80–100)
MONOCYTES # BLD AUTO: 1.2 X10*3/UL (ref 0.1–1)
MONOCYTES NFR BLD AUTO: 6.8 %
NEUTROPHILS # BLD AUTO: 15.37 X10*3/UL (ref 1.2–7.7)
NEUTROPHILS NFR BLD AUTO: 87 %
NRBC BLD-RTO: 0 /100 WBCS (ref 0–0)
PLATELET # BLD AUTO: 244 X10*3/UL (ref 150–450)
RBC # BLD AUTO: 4.72 X10*6/UL (ref 4.5–5.9)
SARS-COV-2 RNA RESP QL NAA+PROBE: NOT DETECTED
WBC # BLD AUTO: 17.7 X10*3/UL (ref 4.4–11.3)

## 2024-12-28 PROCEDURE — 2500000004 HC RX 250 GENERAL PHARMACY W/ HCPCS (ALT 636 FOR OP/ED): Performed by: EMERGENCY MEDICINE

## 2024-12-28 PROCEDURE — 73562 X-RAY EXAM OF KNEE 3: CPT | Mod: LEFT SIDE | Performed by: STUDENT IN AN ORGANIZED HEALTH CARE EDUCATION/TRAINING PROGRAM

## 2024-12-28 PROCEDURE — 89060 EXAM SYNOVIAL FLUID CRYSTALS: CPT | Mod: GEALAB

## 2024-12-28 PROCEDURE — 99285 EMERGENCY DEPT VISIT HI MDM: CPT | Performed by: EMERGENCY MEDICINE

## 2024-12-28 PROCEDURE — 80048 BASIC METABOLIC PNL TOTAL CA: CPT | Performed by: NURSE PRACTITIONER

## 2024-12-28 PROCEDURE — 20610 DRAIN/INJ JOINT/BURSA W/O US: CPT

## 2024-12-28 PROCEDURE — 2500000004 HC RX 250 GENERAL PHARMACY W/ HCPCS (ALT 636 FOR OP/ED)

## 2024-12-28 PROCEDURE — 96375 TX/PRO/DX INJ NEW DRUG ADDON: CPT

## 2024-12-28 PROCEDURE — 83036 HEMOGLOBIN GLYCOSYLATED A1C: CPT | Mod: GEALAB | Performed by: NURSE PRACTITIONER

## 2024-12-28 PROCEDURE — 86140 C-REACTIVE PROTEIN: CPT

## 2024-12-28 PROCEDURE — 87636 SARSCOV2 & INF A&B AMP PRB: CPT

## 2024-12-28 PROCEDURE — 0S9D3ZZ DRAINAGE OF LEFT KNEE JOINT, PERCUTANEOUS APPROACH: ICD-10-PCS | Performed by: ORTHOPAEDIC SURGERY

## 2024-12-28 PROCEDURE — 87077 CULTURE AEROBIC IDENTIFY: CPT | Mod: GEALAB | Performed by: EMERGENCY MEDICINE

## 2024-12-28 PROCEDURE — 89051 BODY FLUID CELL COUNT: CPT | Performed by: EMERGENCY MEDICINE

## 2024-12-28 PROCEDURE — 85025 COMPLETE CBC W/AUTO DIFF WBC: CPT

## 2024-12-28 PROCEDURE — 36415 COLL VENOUS BLD VENIPUNCTURE: CPT

## 2024-12-28 PROCEDURE — 85652 RBC SED RATE AUTOMATED: CPT

## 2024-12-28 PROCEDURE — 73562 X-RAY EXAM OF KNEE 3: CPT | Mod: LT

## 2024-12-28 PROCEDURE — 96365 THER/PROPH/DIAG IV INF INIT: CPT

## 2024-12-28 RX ORDER — VANCOMYCIN HYDROCHLORIDE 1 G/20ML
INJECTION, POWDER, LYOPHILIZED, FOR SOLUTION INTRAVENOUS DAILY PRN
Status: DISCONTINUED | OUTPATIENT
Start: 2024-12-28 | End: 2024-12-28

## 2024-12-28 RX ORDER — ONDANSETRON HYDROCHLORIDE 2 MG/ML
4 INJECTION, SOLUTION INTRAVENOUS ONCE
Status: COMPLETED | OUTPATIENT
Start: 2024-12-28 | End: 2024-12-28

## 2024-12-28 RX ORDER — MORPHINE SULFATE 4 MG/ML
4 INJECTION INTRAVENOUS ONCE
Status: COMPLETED | OUTPATIENT
Start: 2024-12-28 | End: 2024-12-28

## 2024-12-28 RX ADMIN — MORPHINE SULFATE 4 MG: 4 INJECTION INTRAVENOUS at 22:22

## 2024-12-28 RX ADMIN — ONDANSETRON 4 MG: 2 INJECTION, SOLUTION INTRAMUSCULAR; INTRAVENOUS at 22:22

## 2024-12-28 RX ADMIN — PIPERACILLIN SODIUM AND TAZOBACTAM SODIUM 3.38 G: 3; .375 INJECTION, SOLUTION INTRAVENOUS at 23:37

## 2024-12-28 ASSESSMENT — PAIN - FUNCTIONAL ASSESSMENT: PAIN_FUNCTIONAL_ASSESSMENT: 0-10

## 2024-12-28 ASSESSMENT — COLUMBIA-SUICIDE SEVERITY RATING SCALE - C-SSRS
2. HAVE YOU ACTUALLY HAD ANY THOUGHTS OF KILLING YOURSELF?: NO
1. IN THE PAST MONTH, HAVE YOU WISHED YOU WERE DEAD OR WISHED YOU COULD GO TO SLEEP AND NOT WAKE UP?: NO
6. HAVE YOU EVER DONE ANYTHING, STARTED TO DO ANYTHING, OR PREPARED TO DO ANYTHING TO END YOUR LIFE?: NO

## 2024-12-28 ASSESSMENT — PAIN DESCRIPTION - LOCATION: LOCATION: KNEE

## 2024-12-28 ASSESSMENT — LIFESTYLE VARIABLES
TOTAL SCORE: 0
EVER HAD A DRINK FIRST THING IN THE MORNING TO STEADY YOUR NERVES TO GET RID OF A HANGOVER: NO
EVER FELT BAD OR GUILTY ABOUT YOUR DRINKING: NO
HAVE YOU EVER FELT YOU SHOULD CUT DOWN ON YOUR DRINKING: NO
HAVE PEOPLE ANNOYED YOU BY CRITICIZING YOUR DRINKING: NO

## 2024-12-28 ASSESSMENT — PAIN DESCRIPTION - ORIENTATION: ORIENTATION: LEFT

## 2024-12-28 ASSESSMENT — PAIN DESCRIPTION - PAIN TYPE: TYPE: ACUTE PAIN

## 2024-12-28 ASSESSMENT — PAIN DESCRIPTION - FREQUENCY: FREQUENCY: INTERMITTENT

## 2024-12-28 ASSESSMENT — PAIN SCALES - GENERAL: PAINLEVEL_OUTOF10: 7

## 2024-12-29 VITALS
WEIGHT: 206 LBS | BODY MASS INDEX: 29.49 KG/M2 | TEMPERATURE: 97.7 F | HEART RATE: 67 BPM | DIASTOLIC BLOOD PRESSURE: 66 MMHG | OXYGEN SATURATION: 94 % | SYSTOLIC BLOOD PRESSURE: 118 MMHG | RESPIRATION RATE: 18 BRPM | HEIGHT: 70 IN

## 2024-12-29 DIAGNOSIS — I10 BENIGN HYPERTENSION: ICD-10-CM

## 2024-12-29 LAB
ANION GAP SERPL CALC-SCNC: 11 MMOL/L (ref 10–20)
ANION GAP SERPL CALC-SCNC: 17 MMOL/L (ref 10–20)
BACTERIA BLD CULT: NORMAL
BACTERIA BLD CULT: NORMAL
BASOPHILS NFR FLD MANUAL: 0 %
BLASTS NFR FLD MANUAL: 0 %
BUN SERPL-MCNC: 17 MG/DL (ref 6–23)
BUN SERPL-MCNC: 21 MG/DL (ref 6–23)
CALCIUM SERPL-MCNC: 7.8 MG/DL (ref 8.6–10.3)
CALCIUM SERPL-MCNC: 9 MG/DL (ref 8.6–10.3)
CHLORIDE SERPL-SCNC: 103 MMOL/L (ref 98–107)
CHLORIDE SERPL-SCNC: 104 MMOL/L (ref 98–107)
CLARITY FLD: ABNORMAL
CO2 SERPL-SCNC: 24 MMOL/L (ref 21–32)
CO2 SERPL-SCNC: 25 MMOL/L (ref 21–32)
COLOR FLD: YELLOW
CREAT SERPL-MCNC: 0.95 MG/DL (ref 0.5–1.3)
CREAT SERPL-MCNC: 0.99 MG/DL (ref 0.5–1.3)
CRYSTALS FLD MICRO: NORMAL
EGFRCR SERPLBLD CKD-EPI 2021: 85 ML/MIN/1.73M*2
EGFRCR SERPLBLD CKD-EPI 2021: 89 ML/MIN/1.73M*2
EOSINOPHIL NFR FLD MANUAL: 0 %
ERYTHROCYTE [DISTWIDTH] IN BLOOD BY AUTOMATED COUNT: 13.3 % (ref 11.5–14.5)
EST. AVERAGE GLUCOSE BLD GHB EST-MCNC: 114 MG/DL
GLUCOSE SERPL-MCNC: 121 MG/DL (ref 74–99)
GLUCOSE SERPL-MCNC: 129 MG/DL (ref 74–99)
GRAM STN SPEC: NORMAL
GRAM STN SPEC: NORMAL
HBA1C MFR BLD: 5.6 %
HCT VFR BLD AUTO: 37.5 % (ref 41–52)
HGB BLD-MCNC: 12.3 G/DL (ref 13.5–17.5)
HOLD SPECIMEN: NORMAL
IMMATURE GRANULOCYTES IN FLUID: 0 %
LACTATE SERPL-SCNC: 1.3 MMOL/L (ref 0.4–2)
LYMPHOCYTES NFR FLD MANUAL: 5 %
MCH RBC QN AUTO: 28.9 PG (ref 26–34)
MCHC RBC AUTO-ENTMCNC: 32.8 G/DL (ref 32–36)
MCV RBC AUTO: 88 FL (ref 80–100)
MONOS+MACROS NFR FLD MANUAL: 3 %
NEUTROPHILS NFR FLD MANUAL: 92 %
NRBC BLD-RTO: 0 /100 WBCS (ref 0–0)
OTHER CELLS NFR FLD MANUAL: 0 %
PLASMA CELLS NFR FLD MANUAL: 0 %
PLATELET # BLD AUTO: 204 X10*3/UL (ref 150–450)
POTASSIUM SERPL-SCNC: 3.6 MMOL/L (ref 3.5–5.3)
POTASSIUM SERPL-SCNC: 4.3 MMOL/L (ref 3.5–5.3)
PROCALCITONIN SERPL-MCNC: 0.62 NG/ML
RBC # BLD AUTO: 4.25 X10*6/UL (ref 4.5–5.9)
RBC # FLD AUTO: 8000 /UL
SODIUM SERPL-SCNC: 136 MMOL/L (ref 136–145)
SODIUM SERPL-SCNC: 140 MMOL/L (ref 136–145)
TOTAL CELLS COUNTED FLD: 100
WBC # BLD AUTO: 14.9 X10*3/UL (ref 4.4–11.3)
WBC # FLD AUTO: ABNORMAL /UL

## 2024-12-29 PROCEDURE — 2500000004 HC RX 250 GENERAL PHARMACY W/ HCPCS (ALT 636 FOR OP/ED): Performed by: NURSE PRACTITIONER

## 2024-12-29 PROCEDURE — 99223 1ST HOSP IP/OBS HIGH 75: CPT | Performed by: NURSE PRACTITIONER

## 2024-12-29 PROCEDURE — 2500000004 HC RX 250 GENERAL PHARMACY W/ HCPCS (ALT 636 FOR OP/ED)

## 2024-12-29 PROCEDURE — 85027 COMPLETE CBC AUTOMATED: CPT | Performed by: NURSE PRACTITIONER

## 2024-12-29 PROCEDURE — 94760 N-INVAS EAR/PLS OXIMETRY 1: CPT

## 2024-12-29 PROCEDURE — 36415 COLL VENOUS BLD VENIPUNCTURE: CPT | Performed by: NURSE PRACTITIONER

## 2024-12-29 PROCEDURE — 84145 PROCALCITONIN (PCT): CPT | Mod: GEALAB | Performed by: NURSE PRACTITIONER

## 2024-12-29 PROCEDURE — 87081 CULTURE SCREEN ONLY: CPT | Mod: GEALAB | Performed by: NURSE PRACTITIONER

## 2024-12-29 PROCEDURE — 83605 ASSAY OF LACTIC ACID: CPT | Performed by: NURSE PRACTITIONER

## 2024-12-29 PROCEDURE — 2500000001 HC RX 250 WO HCPCS SELF ADMINISTERED DRUGS (ALT 637 FOR MEDICARE OP): Performed by: NURSE PRACTITIONER

## 2024-12-29 PROCEDURE — 1100000001 HC PRIVATE ROOM DAILY

## 2024-12-29 PROCEDURE — 87040 BLOOD CULTURE FOR BACTERIA: CPT | Mod: GEALAB | Performed by: NURSE PRACTITIONER

## 2024-12-29 PROCEDURE — 2500000005 HC RX 250 GENERAL PHARMACY W/O HCPCS

## 2024-12-29 PROCEDURE — 96375 TX/PRO/DX INJ NEW DRUG ADDON: CPT

## 2024-12-29 PROCEDURE — 80048 BASIC METABOLIC PNL TOTAL CA: CPT | Performed by: NURSE PRACTITIONER

## 2024-12-29 RX ORDER — NALOXONE HYDROCHLORIDE 0.4 MG/ML
0.4 INJECTION, SOLUTION INTRAMUSCULAR; INTRAVENOUS; SUBCUTANEOUS AS NEEDED
Status: DISCONTINUED | OUTPATIENT
Start: 2024-12-29 | End: 2025-01-03 | Stop reason: HOSPADM

## 2024-12-29 RX ORDER — ONDANSETRON HYDROCHLORIDE 2 MG/ML
4 INJECTION, SOLUTION INTRAVENOUS EVERY 8 HOURS PRN
Status: DISCONTINUED | OUTPATIENT
Start: 2024-12-29 | End: 2025-01-03 | Stop reason: HOSPADM

## 2024-12-29 RX ORDER — POLYETHYLENE GLYCOL 3350 17 G/17G
17 POWDER, FOR SOLUTION ORAL DAILY
Status: DISCONTINUED | OUTPATIENT
Start: 2024-12-29 | End: 2025-01-03 | Stop reason: HOSPADM

## 2024-12-29 RX ORDER — LOSARTAN POTASSIUM 50 MG/1
100 TABLET ORAL DAILY
Status: DISCONTINUED | OUTPATIENT
Start: 2024-12-29 | End: 2025-01-03 | Stop reason: HOSPADM

## 2024-12-29 RX ORDER — ACETAMINOPHEN 325 MG/1
650 TABLET ORAL EVERY 4 HOURS PRN
Status: DISCONTINUED | OUTPATIENT
Start: 2024-12-29 | End: 2025-01-03 | Stop reason: HOSPADM

## 2024-12-29 RX ORDER — ENOXAPARIN SODIUM 100 MG/ML
40 INJECTION SUBCUTANEOUS EVERY 24 HOURS
Status: DISCONTINUED | OUTPATIENT
Start: 2024-12-29 | End: 2024-12-30

## 2024-12-29 RX ORDER — OXYCODONE HYDROCHLORIDE 5 MG/1
5 TABLET ORAL EVERY 6 HOURS PRN
Status: DISCONTINUED | OUTPATIENT
Start: 2024-12-29 | End: 2025-01-03 | Stop reason: HOSPADM

## 2024-12-29 RX ORDER — VANCOMYCIN HYDROCHLORIDE 1 G/20ML
INJECTION, POWDER, LYOPHILIZED, FOR SOLUTION INTRAVENOUS DAILY PRN
Status: DISCONTINUED | OUTPATIENT
Start: 2024-12-29 | End: 2025-01-01

## 2024-12-29 RX ORDER — LOSARTAN POTASSIUM 100 MG/1
100 TABLET ORAL DAILY
Qty: 90 TABLET | Refills: 3 | Status: SHIPPED | OUTPATIENT
Start: 2024-12-29

## 2024-12-29 RX ORDER — ONDANSETRON 4 MG/1
4 TABLET, FILM COATED ORAL EVERY 8 HOURS PRN
Status: DISCONTINUED | OUTPATIENT
Start: 2024-12-29 | End: 2025-01-03 | Stop reason: HOSPADM

## 2024-12-29 RX ORDER — VANCOMYCIN HYDROCHLORIDE 1 G/200ML
1000 INJECTION, SOLUTION INTRAVENOUS EVERY 12 HOURS
Status: DISCONTINUED | OUTPATIENT
Start: 2024-12-29 | End: 2025-01-01

## 2024-12-29 RX ORDER — CEFTRIAXONE 2 G/50ML
2 INJECTION, SOLUTION INTRAVENOUS EVERY 24 HOURS
Status: DISCONTINUED | OUTPATIENT
Start: 2024-12-29 | End: 2025-01-03 | Stop reason: HOSPADM

## 2024-12-29 RX ORDER — ACETAMINOPHEN 650 MG/1
650 SUPPOSITORY RECTAL EVERY 4 HOURS PRN
Status: DISCONTINUED | OUTPATIENT
Start: 2024-12-29 | End: 2025-01-03 | Stop reason: HOSPADM

## 2024-12-29 RX ORDER — OXYCODONE HYDROCHLORIDE 10 MG/1
10 TABLET ORAL EVERY 6 HOURS PRN
Status: DISCONTINUED | OUTPATIENT
Start: 2024-12-29 | End: 2025-01-03 | Stop reason: HOSPADM

## 2024-12-29 RX ORDER — ACETAMINOPHEN 160 MG/5ML
650 SOLUTION ORAL EVERY 4 HOURS PRN
Status: DISCONTINUED | OUTPATIENT
Start: 2024-12-29 | End: 2025-01-03 | Stop reason: HOSPADM

## 2024-12-29 RX ORDER — AMLODIPINE BESYLATE 5 MG/1
5 TABLET ORAL DAILY
Status: DISCONTINUED | OUTPATIENT
Start: 2024-12-29 | End: 2025-01-01

## 2024-12-29 RX ORDER — MELOXICAM 15 MG/1
1 TABLET ORAL DAILY PRN
COMMUNITY

## 2024-12-29 RX ORDER — PANTOPRAZOLE SODIUM 40 MG/1
40 TABLET, DELAYED RELEASE ORAL
Status: DISCONTINUED | OUTPATIENT
Start: 2024-12-29 | End: 2025-01-03 | Stop reason: HOSPADM

## 2024-12-29 RX ADMIN — AMLODIPINE BESYLATE 5 MG: 5 TABLET ORAL at 09:43

## 2024-12-29 RX ADMIN — VANCOMYCIN HYDROCHLORIDE 1000 MG: 1 INJECTION, SOLUTION INTRAVENOUS at 23:25

## 2024-12-29 RX ADMIN — LOSARTAN POTASSIUM 100 MG: 50 TABLET, FILM COATED ORAL at 09:42

## 2024-12-29 RX ADMIN — ACETAMINOPHEN 650 MG: 325 TABLET, FILM COATED ORAL at 15:36

## 2024-12-29 RX ADMIN — VANCOMYCIN HYDROCHLORIDE 1000 MG: 1 INJECTION, SOLUTION INTRAVENOUS at 13:11

## 2024-12-29 RX ADMIN — ONDANSETRON 4 MG: 2 INJECTION, SOLUTION INTRAMUSCULAR; INTRAVENOUS at 06:53

## 2024-12-29 RX ADMIN — PANTOPRAZOLE SODIUM 40 MG: 40 TABLET, DELAYED RELEASE ORAL at 09:43

## 2024-12-29 RX ADMIN — ENOXAPARIN SODIUM 40 MG: 40 INJECTION SUBCUTANEOUS at 09:43

## 2024-12-29 RX ADMIN — OXYCODONE HYDROCHLORIDE 10 MG: 10 TABLET ORAL at 02:08

## 2024-12-29 RX ADMIN — CEFTRIAXONE SODIUM 2 G: 2 INJECTION, SOLUTION INTRAVENOUS at 06:32

## 2024-12-29 RX ADMIN — VANCOMYCIN HYDROCHLORIDE 1500 MG: 5 INJECTION, POWDER, LYOPHILIZED, FOR SOLUTION INTRAVENOUS at 00:07

## 2024-12-29 SDOH — SOCIAL STABILITY: SOCIAL INSECURITY: HAVE YOU HAD ANY THOUGHTS OF HARMING ANYONE ELSE?: NO

## 2024-12-29 SDOH — SOCIAL STABILITY: SOCIAL INSECURITY: WERE YOU ABLE TO COMPLETE ALL THE BEHAVIORAL HEALTH SCREENINGS?: YES

## 2024-12-29 SDOH — SOCIAL STABILITY: SOCIAL INSECURITY
WITHIN THE LAST YEAR, HAVE YOU BEEN RAPED OR FORCED TO HAVE ANY KIND OF SEXUAL ACTIVITY BY YOUR PARTNER OR EX-PARTNER?: NO

## 2024-12-29 SDOH — ECONOMIC STABILITY: INCOME INSECURITY: IN THE PAST 12 MONTHS HAS THE ELECTRIC, GAS, OIL, OR WATER COMPANY THREATENED TO SHUT OFF SERVICES IN YOUR HOME?: NO

## 2024-12-29 SDOH — SOCIAL STABILITY: SOCIAL INSECURITY: DO YOU FEEL ANYONE HAS EXPLOITED OR TAKEN ADVANTAGE OF YOU FINANCIALLY OR OF YOUR PERSONAL PROPERTY?: NO

## 2024-12-29 SDOH — SOCIAL STABILITY: SOCIAL INSECURITY: DOES ANYONE TRY TO KEEP YOU FROM HAVING/CONTACTING OTHER FRIENDS OR DOING THINGS OUTSIDE YOUR HOME?: NO

## 2024-12-29 SDOH — SOCIAL STABILITY: SOCIAL INSECURITY: HAVE YOU HAD THOUGHTS OF HARMING ANYONE ELSE?: NO

## 2024-12-29 SDOH — ECONOMIC STABILITY: FOOD INSECURITY: WITHIN THE PAST 12 MONTHS, THE FOOD YOU BOUGHT JUST DIDN'T LAST AND YOU DIDN'T HAVE MONEY TO GET MORE.: NEVER TRUE

## 2024-12-29 SDOH — SOCIAL STABILITY: SOCIAL INSECURITY: WITHIN THE LAST YEAR, HAVE YOU BEEN HUMILIATED OR EMOTIONALLY ABUSED IN OTHER WAYS BY YOUR PARTNER OR EX-PARTNER?: NO

## 2024-12-29 SDOH — SOCIAL STABILITY: SOCIAL INSECURITY
WITHIN THE LAST YEAR, HAVE YOU BEEN KICKED, HIT, SLAPPED, OR OTHERWISE PHYSICALLY HURT BY YOUR PARTNER OR EX-PARTNER?: NO

## 2024-12-29 SDOH — ECONOMIC STABILITY: FOOD INSECURITY: WITHIN THE PAST 12 MONTHS, YOU WORRIED THAT YOUR FOOD WOULD RUN OUT BEFORE YOU GOT THE MONEY TO BUY MORE.: NEVER TRUE

## 2024-12-29 SDOH — SOCIAL STABILITY: SOCIAL INSECURITY: WITHIN THE LAST YEAR, HAVE YOU BEEN AFRAID OF YOUR PARTNER OR EX-PARTNER?: NO

## 2024-12-29 SDOH — SOCIAL STABILITY: SOCIAL INSECURITY: DO YOU FEEL UNSAFE GOING BACK TO THE PLACE WHERE YOU ARE LIVING?: NO

## 2024-12-29 SDOH — SOCIAL STABILITY: SOCIAL INSECURITY: ARE YOU OR HAVE YOU BEEN THREATENED OR ABUSED PHYSICALLY, EMOTIONALLY, OR SEXUALLY BY ANYONE?: NO

## 2024-12-29 SDOH — SOCIAL STABILITY: SOCIAL INSECURITY: HAS ANYONE EVER THREATENED TO HURT YOUR FAMILY OR YOUR PETS?: NO

## 2024-12-29 SDOH — SOCIAL STABILITY: SOCIAL INSECURITY: ABUSE: ADULT

## 2024-12-29 ASSESSMENT — ACTIVITIES OF DAILY LIVING (ADL)
HEARING - LEFT EAR: FUNCTIONAL
JUDGMENT_ADEQUATE_SAFELY_COMPLETE_DAILY_ACTIVITIES: YES
TOILETING: INDEPENDENT
BATHING: INDEPENDENT
ADEQUATE_TO_COMPLETE_ADL: YES
ADEQUATE_TO_COMPLETE_ADL: YES
GROOMING: INDEPENDENT
JUDGMENT_ADEQUATE_SAFELY_COMPLETE_DAILY_ACTIVITIES: YES
FEEDING YOURSELF: INDEPENDENT
PATIENT'S MEMORY ADEQUATE TO SAFELY COMPLETE DAILY ACTIVITIES?: YES
PATIENT'S MEMORY ADEQUATE TO SAFELY COMPLETE DAILY ACTIVITIES?: YES
DRESSING YOURSELF: INDEPENDENT
HEARING - RIGHT EAR: FUNCTIONAL
WALKS IN HOME: INDEPENDENT
HEARING - LEFT EAR: FUNCTIONAL
LACK_OF_TRANSPORTATION: NO
BATHING: INDEPENDENT
LACK_OF_TRANSPORTATION: NO
HEARING - RIGHT EAR: FUNCTIONAL
WALKS IN HOME: INDEPENDENT
DRESSING YOURSELF: INDEPENDENT
GROOMING: INDEPENDENT
FEEDING YOURSELF: INDEPENDENT

## 2024-12-29 ASSESSMENT — COGNITIVE AND FUNCTIONAL STATUS - GENERAL
DAILY ACTIVITIY SCORE: 24
CLIMB 3 TO 5 STEPS WITH RAILING: A LITTLE
DAILY ACTIVITIY SCORE: 24
MOBILITY SCORE: 24
PATIENT BASELINE BEDBOUND: NO
MOBILITY SCORE: 22
DAILY ACTIVITIY SCORE: 24
WALKING IN HOSPITAL ROOM: A LITTLE
MOBILITY SCORE: 23
CLIMB 3 TO 5 STEPS WITH RAILING: A LITTLE

## 2024-12-29 ASSESSMENT — PAIN SCALES - GENERAL
PAINLEVEL_OUTOF10: 3
PAINLEVEL_OUTOF10: 1
PAINLEVEL_OUTOF10: 3
PAINLEVEL_OUTOF10: 0 - NO PAIN
PAINLEVEL_OUTOF10: 1
PAINLEVEL_OUTOF10: 1
PAINLEVEL_OUTOF10: 9

## 2024-12-29 ASSESSMENT — ENCOUNTER SYMPTOMS
BRUISES/BLEEDS EASILY: 0
ABDOMINAL PAIN: 0
VOMITING: 0
ROS SKIN COMMENTS: LEFT KNEE
SORE THROAT: 0
MYALGIAS: 1
POLYDIPSIA: 0
COUGH: 0
DIZZINESS: 0
JOINT SWELLING: 1
HEADACHES: 0
ARTHRALGIAS: 1
NAUSEA: 0
CONFUSION: 0
APPETITE CHANGE: 0
EYE ITCHING: 0
COLOR CHANGE: 1
FEVER: 1
EYE REDNESS: 0
PALPITATIONS: 0
FREQUENCY: 0
SHORTNESS OF BREATH: 0
AGITATION: 0
DYSURIA: 0
DIARRHEA: 0
CHILLS: 1
ACTIVITY CHANGE: 1
FATIGUE: 0

## 2024-12-29 ASSESSMENT — LIFESTYLE VARIABLES
AUDIT-C TOTAL SCORE: 4
HOW MANY STANDARD DRINKS CONTAINING ALCOHOL DO YOU HAVE ON A TYPICAL DAY: 1 OR 2
AUDIT-C TOTAL SCORE: 4
SKIP TO QUESTIONS 9-10: 1
HOW OFTEN DO YOU HAVE 6 OR MORE DRINKS ON ONE OCCASION: NEVER
HOW OFTEN DO YOU HAVE A DRINK CONTAINING ALCOHOL: 4 OR MORE TIMES A WEEK

## 2024-12-29 ASSESSMENT — PAIN - FUNCTIONAL ASSESSMENT
PAIN_FUNCTIONAL_ASSESSMENT: 0-10

## 2024-12-29 ASSESSMENT — PATIENT HEALTH QUESTIONNAIRE - PHQ9
1. LITTLE INTEREST OR PLEASURE IN DOING THINGS: NOT AT ALL
2. FEELING DOWN, DEPRESSED OR HOPELESS: NOT AT ALL
SUM OF ALL RESPONSES TO PHQ9 QUESTIONS 1 & 2: 0

## 2024-12-29 NOTE — PROGRESS NOTES
Vancomycin Dosing by Pharmacy- INITIAL    Bj Prado is a 64 y.o. year old male who Pharmacy has been consulted for vancomycin dosing for prosthetic device infection. Based on the patient's indication and renal status this patient will be dosed based on a goal AUC of 400-600.     Renal function is currently stable.    Visit Vitals  /73 (BP Location: Right arm, Patient Position: Sitting)   Pulse 65   Temp 36.9 °C (98.4 °F) (Temporal)   Resp 15        Lab Results   Component Value Date    CREATININE 0.95 2024    CREATININE 1.17 2024    CREATININE 0.97 2023    CREATININE 1.04 2022    CREATININE 0.96 2022    CREATININE 0.94 2022        Patient weight is as follows:   Vitals:    24   Weight: 93.4 kg (206 lb)       Cultures:  No results found for the encounter in last 14 days.        No intake/output data recorded.  I/O during current shift:  No intake/output data recorded.    Temp (24hrs), Av.1 °C (98.8 °F), Min:36.9 °C (98.4 °F), Max:37.2 °C (99 °F)         Assessment/Plan     Patient has already been given a loading dose of 1500 mg.  Will initiate vancomycin maintenance,  1000 mg every 12 hours.    This dosing regimen is predicted by [employer] to result in the following pharmacokinetic parameters:    Regimen: 1000 mg IV every 12 hours.  Start time: 12:07 on 2024  Exposure target: AUC24 (range)400-600 mg/L.hr   [id number]: 442 mg/L.hr  AUC24,ss: 498 mg/L.hr  Probability of AUC24 > 400: 73 %  Ctrough,ss: 16.2 mg/L  Probability of Ctrough,ss > 20: 31 %    Follow-up level will be ordered on  at  unless clinically indicated sooner.  Will continue to monitor renal function daily while on vancomycin and order serum creatinine at least every 48 hours if not already ordered.  Follow for continued vancomycin needs, clinical response, and signs/symptoms of toxicity.       Arjun Ruiz, PharmD

## 2024-12-29 NOTE — H&P
History Of Present Illness  Gian Prado is a 64 y.o. male with pertinent medical history of recent left total knee arthroplasty completed in August 2024 by Dr. Pelletier who presented to [facility] ER with complaints of worsening severe radiating left knee pain that began today and has had knee swelling since September.  He said the pain was so severe today that it was impairing his ability to ambulate and he is now using a cane.  The pain is tolerable at rest, but severe with any movement. He has been taking motrin and Meloxicam daily for the pain. Patient also noted he had a fever of 101 °F at home prior to coming in and complained of some chills. Patient denies nausea, vomiting, diarrhea, cough, chest pain, anorexia, increased fatigue, open wound, urinary symptoms, body aches or new shortness of breath.    Orthopedics did an arthrocentesis in the ER prior to admitting today.  Blood cultures were obtained after antibiotics were started in the ER on admission.    The patient has been seen by Dr. Pelletier's office multiple times since his surgery.  He said he had a recent x-ray a few weeks ago and was told there was a leak in his meniscus.  Patient has not been on antibiotics for his left knee pain and swelling.     Past Medical History  He has a past medical history of Arthritis, Chronic pain disorder, Eye pain (07/24/2023), GERD (gastroesophageal reflux disease), Hypertension, Other abnormal glucose, Personal history of diseases of the skin and subcutaneous tissue (06/07/2017), Primary osteoarthritis of left knee, and Wrist laceration (04/14/2016).    Surgical History  He has a past surgical history that includes Colonoscopy (02/22/2017); Other surgical history (Left); Rotator cuff repair (Left); and Carpal tunnel release (Bilateral), LTKA Aug 2024.     Social History  He reports that he has never smoked. He has never used smokeless tobacco. He reports current alcohol use-3 drinks a week. He reports that he does  not use drugs.    Family History  Family History   Problem Relation Name Age of Onset    Heart attack Mother      Heart attack Father      Heart attack Sister  61        passed        Allergies  Patient has no known allergies.    Review of Systems   Constitutional:  Positive for activity change, chills and fever. Negative for appetite change and fatigue.   HENT:  Negative for sneezing and sore throat.    Eyes:  Negative for redness and itching.   Respiratory:  Negative for cough and shortness of breath.    Cardiovascular:  Negative for chest pain and palpitations.   Gastrointestinal:  Negative for abdominal pain, diarrhea, nausea and vomiting.   Endocrine: Negative for polydipsia and polyuria.   Genitourinary:  Negative for dysuria, frequency and urgency.   Musculoskeletal:  Positive for arthralgias, gait problem, joint swelling and myalgias.        +baseline myalgias and arthralgias, +left knee pain and swelling   Skin:  Positive for color change and rash.        Left knee   Neurological:  Negative for dizziness and headaches.   Hematological:  Does not bruise/bleed easily.   Psychiatric/Behavioral:  Negative for agitation, behavioral problems and confusion.      Physical Exam  Vitals reviewed.   Constitutional:       General: He is not in acute distress.     Appearance: He is not ill-appearing, toxic-appearing or diaphoretic.   HENT:      Head: Normocephalic and atraumatic.      Mouth/Throat:      Mouth: Mucous membranes are moist.      Pharynx: Oropharynx is clear.   Eyes:      Extraocular Movements: Extraocular movements intact.      Conjunctiva/sclera: Conjunctivae normal.   Cardiovascular:      Rate and Rhythm: Normal rate and regular rhythm.      Pulses: Normal pulses.      Heart sounds: No murmur heard.  Pulmonary:      Effort: Pulmonary effort is normal.      Breath sounds: Normal breath sounds.      Comments: On RA  Abdominal:      General: Bowel sounds are normal. There is no distension.      Palpations:  Abdomen is soft.      Tenderness: There is no abdominal tenderness. There is no guarding or rebound.   Musculoskeletal:      Comments: Decreased ROM and swelling of the left knee   Skin:     General: Skin is warm.      Findings: Erythema present.      Comments: Left knee with increased warmth and erythema   Neurological:      General: No focal deficit present.      Mental Status: He is alert and oriented to person, place, and time.      Gait: Gait abnormal.   Psychiatric:         Mood and Affect: Mood normal.         Behavior: Behavior normal.       Last Recorded Vitals  /62   Pulse 67   Temp 37.2 °C (99 °F)   Resp 17   Wt 93.4 kg (206 lb)   SpO2 96%     Relevant Results  Scheduled medications  amLODIPine, 5 mg, oral, Daily  cefTRIAXone, 2 g, intravenous, q24h  enoxaparin, 40 mg, subcutaneous, q24h  losartan, 100 mg, oral, Daily  pantoprazole, 40 mg, oral, Daily before breakfast  polyethylene glycol, 17 g, oral, Daily  vancomycin, 1,000 mg, intravenous, q12h      Continuous medications     PRN medications  PRN medications: acetaminophen **OR** acetaminophen **OR** acetaminophen, naloxone, ondansetron **OR** ondansetron, oxyCODONE, oxyCODONE, vancomycin    Results for orders placed or performed during the hospital encounter of 12/28/24 (from the past 24 hours)   CBC and Auto Differential   Result Value Ref Range    WBC 17.7 (H) 4.4 - 11.3 x10*3/uL    nRBC 0.0 0.0 - 0.0 /100 WBCs    RBC 4.72 4.50 - 5.90 x10*6/uL    Hemoglobin 14.0 13.5 - 17.5 g/dL    Hematocrit 42.1 41.0 - 52.0 %    MCV 89 80 - 100 fL    MCH 29.7 26.0 - 34.0 pg    MCHC 33.3 32.0 - 36.0 g/dL    RDW 13.5 11.5 - 14.5 %    Platelets 244 150 - 450 x10*3/uL    Neutrophils % 87.0 40.0 - 80.0 %    Immature Granulocytes %, Automated 0.5 0.0 - 0.9 %    Lymphocytes % 5.3 13.0 - 44.0 %    Monocytes % 6.8 2.0 - 10.0 %    Eosinophils % 0.1 0.0 - 6.0 %    Basophils % 0.3 0.0 - 2.0 %    Neutrophils Absolute 15.37 (H) 1.20 - 7.70 x10*3/uL    Immature  Granulocytes Absolute, Automated 0.08 0.00 - 0.70 x10*3/uL    Lymphocytes Absolute 0.94 (L) 1.20 - 4.80 x10*3/uL    Monocytes Absolute 1.20 (H) 0.10 - 1.00 x10*3/uL    Eosinophils Absolute 0.01 0.00 - 0.70 x10*3/uL    Basophils Absolute 0.05 0.00 - 0.10 x10*3/uL   C-Reactive Protein   Result Value Ref Range    C-Reactive Protein 2.12 (H) <1.00 mg/dL   Sedimentation Rate   Result Value Ref Range    Sedimentation Rate 11 0 - 20 mm/h   Influenza A, and B PCR   Result Value Ref Range    Flu A Result Not Detected Not Detected    Flu B Result Not Detected Not Detected   Sars-CoV-2 PCR   Result Value Ref Range    Coronavirus 2019, PCR Not Detected Not Detected   Basic Metabolic Panel   Result Value Ref Range    Glucose 121 (H) 74 - 99 mg/dL    Sodium 140 136 - 145 mmol/L    Potassium 4.3 3.5 - 5.3 mmol/L    Chloride 103 98 - 107 mmol/L    Bicarbonate 24 21 - 32 mmol/L    Anion Gap 17 10 - 20 mmol/L    Urea Nitrogen 21 6 - 23 mg/dL    Creatinine 0.95 0.50 - 1.30 mg/dL    eGFR 89 >60 mL/min/1.73m*2    Calcium 9.0 8.6 - 10.3 mg/dL   Body Fluid Cell Count   Result Value Ref Range    Color, Fluid Yellow Colorless, Straw, Yellow    Clarity, Fluid Hazy (A) Clear    WBC, Fluid 22,566 See Comment /uL    RBC, Fluid 8,000 0  /uL /uL   Body Fluid Differential   Result Value Ref Range    Neutrophils %, Manual, Fluid 92 <25 % %    Lymphocytes %, Manual, Fluid 5 <75 % %    Mono/Macrophages %, Manual, Fluid 3 <70 % %    Eosinophils %, Manual, Fluid 0 0 % %    Basophils %, Manual, Fluid 0 0 % %    Immature Granulocytes %, Manual, Fluid 0 0 % %    Blasts %, Manual, Fluid 0 0 % %    Unclassified Cells %, Manual, Fluid 0 0 % %    Plasma Cells %, Manual, Fluid 0 0 % %    Total Cells Counted, Fluid 100    Red Top   Result Value Ref Range    Extra Tube Hold for add-ons.    Lactate   Result Value Ref Range    Lactate 1.3 0.4 - 2.0 mmol/L     XR knee left 3 views    Result Date: 12/28/2024  Interpreted By:  Molina Snow, STUDY: XR KNEE LEFT 3  VIEWS; ;  12/28/2024 9:08 pm   INDICATION: Signs/Symptoms:knee pain swelling warmth.     COMPARISON: None. Left knee radiographs 08/12/2024.   ACCESSION NUMBER(S): [id number]   ORDERING CLINICIAN: KEAGAN SAHU   FINDINGS: There is left total knee arthroplasty prosthesis without evidence of periprosthetic fracture or loosening. There is moderate suprapatellar knee joint effusion similar to prior. There is  new or increased infrapatellar soft tissue swelling.       Left knee arthroplasty without evidence of periprosthetic fracture or loosening. There is moderate suprapatellar knee joint effusion. There is new infrapatellar soft tissue swelling. Clinical correlation with direct inspection is recommended.     MACRO: None   Signed by: Molina Snow 12/28/2024 9:45 PM Dictation workstation:   [id number]     Assessment/Plan   Assessment & Plan  Knee effusion, left  Pain control and treat as above/OARRS  Tylenol and oxycodone  Educated on not taking Motrin and Meloxicam on the same day    Concern for Septic Arthritis VS Left Knee Cellulitis  -Subjective fever and + WBC,   Blood cultures were sent after atb started-f/U  Knee arthrocentesis samples taken in ER-f/U  ID & ortho consult-appreciate recs  Rocephin and Vanco  MRSA-f/U    Difficulty with Ambulation  PT consult-appreciate recs    HTN  Norvasc, Losartan    GERD  PPI    Full Code    DVT PX  Lovenox, SCDS    Barbara Stevenson, APRN-CNP  75 min spent interviewing and assessing patient, placing admission orders, updating MAR, CODE STATUS and reviewing chart labs and diagnostics, updating on care plan.

## 2024-12-29 NOTE — CONSULTS
"Consults  Referred by AYAD Lynn    Primary MD: Keith Doe,     Reason For Consult  Lt knee septic arthritis    History Of Present Illness  Bj Prado \"Gian\" is a 64 y.o. male, hx of HTN, hx of Lt knee arthroplasty 8/12, he had some residula postoperative seroma, the fluid was aspirated in the office, he was told there was no infection, he was admitted for a 1 day hx of Lt knee pain and fever, the WBC were up, arthrocentesis showed WBC 23680, 92% N, cultures are pending, no trauma, no drainage     Past Medical History  He has a past medical history of Arthritis, Chronic pain disorder, Eye pain (07/24/2023), GERD (gastroesophageal reflux disease), Hypertension, Other abnormal glucose, Personal history of diseases of the skin and subcutaneous tissue (06/07/2017), Primary osteoarthritis of left knee, and Wrist laceration (04/14/2016).    Surgical History  He has a past surgical history that includes Colonoscopy (02/22/2017); Other surgical history (Left); Rotator cuff repair (Left); and Carpal tunnel release (Bilateral).     Social History     Occupational History    Not on file   Tobacco Use    Smoking status: Never    Smokeless tobacco: Never   Vaping Use    Vaping status: Never Used   Substance and Sexual Activity    Alcohol use: Yes     Comment: SOCIALLY    Drug use: Never    Sexual activity: Not on file     Travel History   Travel since 11/29/24    No documented travel since 11/29/24          Family History  Family History   Problem Relation Name Age of Onset    Heart attack Mother      Heart attack Father      Heart attack Sister  61        passed     Allergies  Patient has no known allergies.     Immunization History   Administered Date(s) Administered    COVID-19, mRNA, LNP-S, PF, 30 mcg/0.3 mL dose 03/09/2021, 04/06/2021, 11/23/2021    Flu vaccine, quadrivalent, recombinant, preservative free, adult (FLUBLOK) 01/07/2021, 01/12/2022    Influenza, injectable, quadrivalent 01/21/2020   Pneumonia and " "influenza vaccine is planned  [place] fall risk 30, preventive protocol was implemented  Depression screen is negative    Medications  Home medications:  Medications Prior to Admission   Medication Sig Dispense Refill Last Dose/Taking    amLODIPine (Norvasc) 5 mg tablet Take 1 tablet (5 mg) by mouth once daily. 90 tablet 3 12/28/2024    ibuprofen (IBU) 600 mg tablet Take 1 tablet (600 mg) by mouth every 6 hours if needed for moderate pain (4 - 6).   12/28/2024    omeprazole (PriLOSEC) 40 mg DR capsule TAKE ONE CAPSULE BY MOUTH EVERY DAY. OVER DUE FOR APPOINTMENT, LAST REFILL 90 capsule 3 12/28/2024    albuterol 90 mcg/actuation inhaler INHALE 1 TO 2 PUFFS EVERY 4 TO 6 HOURS AS NEEDED.       meloxicam (Mobic) 15 mg tablet Take 1 tablet (15 mg) by mouth once daily as needed.       polyethylene glycol (Glycolax, Miralax) 17 gram/dose powder Take 17 g by mouth once daily. 238 g 0      Current medications:  Scheduled medications  amLODIPine, 5 mg, oral, Daily  cefTRIAXone, 2 g, intravenous, q24h  enoxaparin, 40 mg, subcutaneous, q24h  losartan, 100 mg, oral, Daily  pantoprazole, 40 mg, oral, Daily before breakfast  polyethylene glycol, 17 g, oral, Daily  vancomycin, 1,000 mg, intravenous, q12h      Continuous medications     PRN medications  PRN medications: acetaminophen **OR** acetaminophen **OR** acetaminophen, naloxone, ondansetron **OR** ondansetron, oxyCODONE, oxyCODONE, vancomycin    Review of Systems   All other systems reviewed and are negative.       Objective  Range of Vitals (last 24 hours)  Heart Rate:  [64-99]   Temp:  [36.6 °C (97.9 °F)-37.2 °C (99 °F)]   Resp:  [15-20]   BP: (115-165)/(62-92)   Height:  [177.8 cm (5' 10\")]   Weight:  [93.4 kg (206 lb)]   SpO2:  [94 %-99 %]   Daily Weight  12/28/24 : 93.4 kg (206 lb)    Body mass index is 29.56 kg/m².   Nutritional consult    Physical Exam  Constitutional:       Appearance: Normal appearance.   HENT:      Head: Normocephalic and atraumatic.      " "Mouth/Throat:      Mouth: Mucous membranes are moist.      Pharynx: Oropharynx is clear.   Eyes:      Pupils: Pupils are equal, round, and reactive to light.   Cardiovascular:      Rate and Rhythm: Normal rate and regular rhythm.      Heart sounds: Normal heart sounds.   Pulmonary:      Effort: Pulmonary effort is normal.      Breath sounds: Normal breath sounds.   Abdominal:      General: Abdomen is flat. Bowel sounds are normal.      Palpations: Abdomen is soft.   Musculoskeletal:      Cervical back: Normal range of motion.      Comments: Lt knee effusion, minimal redness   Neurological:      Mental Status: He is alert.          Relevant Results  Outside Hospital Results  reviewed  Labs  Results from last 72 hours   Lab Units 12/29/24  0735 12/28/24  2115   WBC AUTO x10*3/uL 14.9* 17.7*   HEMOGLOBIN g/dL 12.3* 14.0   HEMATOCRIT % 37.5* 42.1   PLATELETS AUTO x10*3/uL 204 244   NEUTROS PCT AUTO %  --  87.0   LYMPHS PCT AUTO %  --  5.3   MONOS PCT AUTO %  --  6.8   EOS PCT AUTO %  --  0.1     Results from last 72 hours   Lab Units 12/29/24  0735 12/28/24  2115   SODIUM mmol/L 136 140   POTASSIUM mmol/L 3.6 4.3   CHLORIDE mmol/L 104 103   CO2 mmol/L 25 24   BUN mg/dL 17 21   CREATININE mg/dL 0.99 0.95   GLUCOSE mg/dL 129* 121*   CALCIUM mg/dL 7.8* 9.0   ANION GAP mmol/L 11 17   EGFR mL/min/1.73m*2 85 89         Estimated Creatinine Clearance: 85.4 mL/min (by C-G formula based on SCr of 0.99 mg/dL).  C-Reactive Protein   Date Value Ref Range Status   12/28/2024 2.12 (H) <1.00 mg/dL Final     Sedimentation Rate   Date Value Ref Range Status   12/28/2024 11 0 - 20 mm/h Final     No results found for: \"HIV1X2\", \"HIVCONF\", \"UPRRPV7NR\"  Hepatitis C Ab   Date Value Ref Range Status   05/14/2019 NON-REACTIVE NONREACTIVE Final     Comment:      Patients receiving more than 5 mg/day of biotin may have interference   in test results.  A sample should be taken no sooner than eight hours   after  previous dose. Contact the testing " laboratory for additional    information.        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      Janet Pemberton MD
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
## 2024-12-29 NOTE — ED TRIAGE NOTES
Pt presented to the ED with c/o knee pain. Pt states the pain started today and has increased. Pt had a knee replacement in August and after that he developed fluid in the knee. Pt has had it drained once but states the pain feels the same. Pt did have a fever at one point today but took motrin and is now afebrile. Pt rates pain 7/10 when walking and none at rest. Pt ambulated back to ED chair.

## 2024-12-29 NOTE — ASSESSMENT & PLAN NOTE
Pain control and treat as above/OARRS  Tylenol and oxycodone  Educated on not taking Motrin and Meloxicam on the same day    Concern for Septic Arthritis VS Left Knee Cellulitis  -Subjective fever and + WBC,   Blood cultures were sent after atb started-f/U  Knee arthrocentesis samples taken in ER-f/U  ID & ortho consult-appreciate recs  Rocephin and Vanco  MRSA-f/U    Difficulty with Ambulation  PT consult-appreciate recs    HTN  Norvasc, Losartan    GERD  PPI    Full Code    DVT PX  Lovenox, SCDS

## 2024-12-29 NOTE — PROGRESS NOTES
12/29/24 1614   Discharge Planning   Living Arrangements Spouse/significant other   Support Systems Spouse/significant other;Children;Family members;Friends/neighbors   Assistance Needed Patient is A&O X3, on room air, independent with ADLs, drives and uses no DME at baseline. Patient has walker and cane at home from knee surgery in August. Patient is not currently active with any HHC agencies. Anticipate need for HHC at time of DC. Cultures are pending.   Type of Residence Private residence   Number of Stairs to Enter Residence 2   Number of Stairs Within Residence 0  (bedroom and bathroom is on main floor)   Who is requesting discharge planning? Provider   Home or Post Acute Services In home services   Type of Home Care Services Home PT;Home OT;Home nursing visits   Expected Discharge Disposition Home H   Does the patient need discharge transport arranged? No   Financial Resource Strain   How hard is it for you to pay for the very basics like food, housing, medical care, and heating? Not hard   Housing Stability   In the last 12 months, was there a time when you were not able to pay the mortgage or rent on time? N   At any time in the past 12 months, were you homeless or living in a shelter (including now)? N   Transportation Needs   In the past 12 months, has lack of transportation kept you from medical appointments or from getting medications? no   In the past 12 months, has lack of transportation kept you from meetings, work, or from getting things needed for daily living? No   Stroke Family Assessment   Stroke Family Assessment Needed No   Intensity of Service   Intensity of Service 0-30 min

## 2024-12-29 NOTE — CARE PLAN
The patient's goals for the shift include  patient will be able to rest during tonight    The clinical goals for the shift include pt will have stable vitals and pain controlled through out the shift    Problem: Pain  Goal: Takes deep breaths with improved pain control throughout the shift  Outcome: Progressing     Problem: Pain  Goal: Turns in bed with improved pain control throughout the shift  Outcome: Progressing     Problem: Pain  Goal: Walks with improved pain control throughout the shift  Outcome: Progressing     Problem: Pain  Goal: Performs ADL's with improved pain control throughout shift  Outcome: Progressing

## 2024-12-29 NOTE — ASSESSMENT & PLAN NOTE
VS Left Knee Cellulitis  Blood cultures were sent after atb started-f/U  Knee arthrocentesis samples taken in ER-f/U  ID & ortho consult-appreciate recs  Rocephin and Vanco  MRSA-f/U

## 2024-12-29 NOTE — CONSULTS
Reason For Consult  Left knee pain/swelling S/P left TKA with Dr. Zelaya at [facility] on 8/12/24    History Of Present Illness  Gian Prado is a 64 y.o. male presenting with pain and swelling and fevers status post left total knee arthroplasty performed by Dr. Clif Zelaya on 8/12/2024 at [facility].  Patient admits he has had residual swelling in the front of his knee since early postoperative previous aspiration performed roughly 2 months ago was unremarkable for infection with negative cultures however swelling in the front of his knee came right back however during that time patient states he really had no pain.  It was felt that he likely had a defect in his arthrotomy and had a synovial sinus which was draining into the subcutaneous space/prepatellar bursal area.  Late last night he started developing increased redness and pain about the knee getting progressively worse throughout the day making it difficult to walk.  Patient started developing low-grade temperature/low-grade temperature at home presented to the emergency department for evaluation while in the emergency department lab work was obtained which revealed an elevated C-reactive protein at 2.12 leukocytosis white blood cell count 17.7 with absolute neutrophils being elevated at 15.3 consistent with left shift and infection.  Sed rate however was within normal limits.  This orthopedic service was then contacted for further evaluation management of his knee.  Currently patient admits to significant pain about his knee again this started last night.  Remainder of his past medical past surgical medications allergies family history and social history are as noted in his electronic medical record and are reviewed.     Past Medical History  He has a past medical history of Arthritis, Chronic pain disorder, Eye pain (07/24/2023), GERD (gastroesophageal reflux disease), Hypertension, Other abnormal glucose, Personal history of diseases of the  "skin and subcutaneous tissue (06/07/2017), Primary osteoarthritis of left knee, and Wrist laceration (04/14/2016).    Surgical History  He has a past surgical history that includes Colonoscopy (02/22/2017); Other surgical history (Left); Rotator cuff repair (Left); and Carpal tunnel release (Bilateral).     Social History  He reports that he has never smoked. He has never used smokeless tobacco. He reports current alcohol use. He reports that he does not use drugs.    Family History  Family History   Problem Relation Name Age of Onset    Heart attack Mother      Heart attack Father      Heart attack Sister  61        passed        Allergies  Patient has no known allergies.    Review of Systems  Patient admits to left knee pain as well as fevers     Physical Exam  On physical exam pleasant 64-year-old male laying supine in hospital [facility] wife at bedside.  He is noted to have large fluid-filled prepatellar space his incision is well-approximated.  No true knee effusion is palpable however there is an obvious palpable defect in his mid vastus arthrotomy and medial parapatellar arthrotomy with subtle lateral subluxation of the patella appreciated.  Range of motion of his knee fairly limited secondary to pain.  Mild erythema appreciated significantly fluid-filled patellar bursal space is seen which does connect to the defect in the mid vastus arthrotomy.     Last Recorded Vitals  Blood pressure 146/69, pulse 84, temperature 37.2 °C (99 °F), temperature source Skin, resp. rate 18, height 1.778 m (5' 10\"), weight 93.4 kg (206 lb), SpO2 99%.    Relevant Results    Orthopedic evaluation of imaging x-rays are available for review multiple views of the left knee these demonstrate well-positioned left total knee arthroplasty rotating platform posterior stabilized design.  Significant fluid-filled prepatellar bursal space is noted difficult to ascertain if there is any true joint effusion versus hypertrophic synovitis in the " suprapatellar pouch.  Implant itself appears well-fixed well aligned without periprosthetic fracture subsidence or loosening    Scheduled medications  piperacillin-tazobactam, 3.375 g, intravenous, Once  vancomycin, 1,500 mg, intravenous, Once      Continuous medications     PRN medications    Results for orders placed or performed during the hospital encounter of 12/28/24 (from the past 24 hours)   CBC and Auto Differential   Result Value Ref Range    WBC 17.7 (H) 4.4 - 11.3 x10*3/uL    nRBC 0.0 0.0 - 0.0 /100 WBCs    RBC 4.72 4.50 - 5.90 x10*6/uL    Hemoglobin 14.0 13.5 - 17.5 g/dL    Hematocrit 42.1 41.0 - 52.0 %    MCV 89 80 - 100 fL    MCH 29.7 26.0 - 34.0 pg    MCHC 33.3 32.0 - 36.0 g/dL    RDW 13.5 11.5 - 14.5 %    Platelets 244 150 - 450 x10*3/uL    Neutrophils % 87.0 40.0 - 80.0 %    Immature Granulocytes %, Automated 0.5 0.0 - 0.9 %    Lymphocytes % 5.3 13.0 - 44.0 %    Monocytes % 6.8 2.0 - 10.0 %    Eosinophils % 0.1 0.0 - 6.0 %    Basophils % 0.3 0.0 - 2.0 %    Neutrophils Absolute 15.37 (H) 1.20 - 7.70 x10*3/uL    Immature Granulocytes Absolute, Automated 0.08 0.00 - 0.70 x10*3/uL    Lymphocytes Absolute 0.94 (L) 1.20 - 4.80 x10*3/uL    Monocytes Absolute 1.20 (H) 0.10 - 1.00 x10*3/uL    Eosinophils Absolute 0.01 0.00 - 0.70 x10*3/uL    Basophils Absolute 0.05 0.00 - 0.10 x10*3/uL   C-Reactive Protein   Result Value Ref Range    C-Reactive Protein 2.12 (H) <1.00 mg/dL   Sedimentation Rate   Result Value Ref Range    Sedimentation Rate 11 0 - 20 mm/h   Influenza A, and B PCR   Result Value Ref Range    Flu A Result Not Detected Not Detected    Flu B Result Not Detected Not Detected   Sars-CoV-2 PCR   Result Value Ref Range    Coronavirus 2019, PCR Not Detected Not Detected        Assessment/Plan     #1.  Status post left primary total knee arthroplasty performed by Dr. Clif Zelaya on 8/12/2024 at [facility]    #2.  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  I did discuss 2 attempted aspirations 1 intra-articular the other into the prepatellar bursal space he was in agreement.  Here today in the emergency room bed 18 laying supine in [facility] utilizing sterile technique through a superior lateral approach following sterile prep with chlorhexidine utilizing an 18-gauge inch and half hypodermic needle attempted aspiration intra-articular to the suprapatellar pouch was performed this was a dry tap without any fluid thick hypertrophic synovial tissue could be palpated but no fluid was easily aspirated.  Needle was removed puncture was covered with a Band-Aid.  At this time aspiration of the prepatellar bursal space was performed again after obtaining informed verbal consent the area was once again cleaned with chlorhexidine and then using a 18-gauge inch and a half hypodermic needle was advanced into the prepatellar bursal space just lateral to the skin incision, a total of 80 cc of a dark brown synovial fluid was aspirated the final 20 cc were very dark and contained fibrous particulate and was cloudy in appearance.  Needle was removed puncture was covered with a Band-Aid patient tolerated the procedure well.  We will send the fluid for Gram stain culture cell count and differential with analysis.  We recommend the patient be admitted to the medicine service and infectious disease be consulted.  Now that fluid samples have been obtained certainly broad-spectrum antibiotics can be started patient received Zosyn and vancomycin after obtaining the fluid samples in the emergency department.  Antibiotics can be fine-tuned as cultures present.  I will discuss case with Dr. Zelaya.  Ideally once cultures are obtained plan for surgery in the form of left knee incision irrigation  debridement polyethylene exchange and placement of antibiotic beads versus single stage or two-stage explantation and revision pending the desires of Dr. Zelaya.  Thank you very much for last participate in the care of this patient we will follow.      Rylan Shelby, DO

## 2024-12-30 ENCOUNTER — APPOINTMENT (OUTPATIENT)
Dept: RADIOLOGY | Facility: HOSPITAL | Age: 64
DRG: 487 | End: 2024-12-30
Payer: MEDICARE

## 2024-12-30 ENCOUNTER — ANESTHESIA (OUTPATIENT)
Dept: OPERATING ROOM | Facility: HOSPITAL | Age: 64
End: 2024-12-30
Payer: MEDICARE

## 2024-12-30 ENCOUNTER — ANESTHESIA EVENT (OUTPATIENT)
Dept: OPERATING ROOM | Facility: HOSPITAL | Age: 64
End: 2024-12-30
Payer: MEDICARE

## 2024-12-30 LAB — VANCOMYCIN SERPL-MCNC: 10.4 UG/ML (ref 5–20)

## 2024-12-30 PROCEDURE — A9999 DME SUPPLY OR ACCESSORY, NOS: HCPCS | Performed by: ORTHOPAEDIC SURGERY

## 2024-12-30 PROCEDURE — 7100000001 HC RECOVERY ROOM TIME - INITIAL BASE CHARGE: Performed by: ORTHOPAEDIC SURGERY

## 2024-12-30 PROCEDURE — 2500000005 HC RX 250 GENERAL PHARMACY W/O HCPCS: Performed by: ORTHOPAEDIC SURGERY

## 2024-12-30 PROCEDURE — 2500000004 HC RX 250 GENERAL PHARMACY W/ HCPCS (ALT 636 FOR OP/ED): Performed by: ANESTHESIOLOGY

## 2024-12-30 PROCEDURE — 2500000004 HC RX 250 GENERAL PHARMACY W/ HCPCS (ALT 636 FOR OP/ED): Performed by: ORTHOPAEDIC SURGERY

## 2024-12-30 PROCEDURE — 7100000002 HC RECOVERY ROOM TIME - EACH INCREMENTAL 1 MINUTE: Performed by: ORTHOPAEDIC SURGERY

## 2024-12-30 PROCEDURE — 36415 COLL VENOUS BLD VENIPUNCTURE: CPT | Performed by: NURSE PRACTITIONER

## 2024-12-30 PROCEDURE — 2780000003 HC OR 278 NO HCPCS: Performed by: ORTHOPAEDIC SURGERY

## 2024-12-30 PROCEDURE — 87070 CULTURE OTHR SPECIMN AEROBIC: CPT | Mod: GEALAB | Performed by: ORTHOPAEDIC SURGERY

## 2024-12-30 PROCEDURE — A27486 PR REVISE KNEE JOINT REPLACE,1 PART: Performed by: ANESTHESIOLOGY

## 2024-12-30 PROCEDURE — 3600000007 HC OR TIME - EACH INCREMENTAL 1 MINUTE - PROCEDURE LEVEL TWO: Performed by: ORTHOPAEDIC SURGERY

## 2024-12-30 PROCEDURE — A27486 PR REVISE KNEE JOINT REPLACE,1 PART: Performed by: NURSE ANESTHETIST, CERTIFIED REGISTERED

## 2024-12-30 PROCEDURE — 87116 MYCOBACTERIA CULTURE: CPT | Mod: GEALAB | Performed by: ORTHOPAEDIC SURGERY

## 2024-12-30 PROCEDURE — 1100000001 HC PRIVATE ROOM DAILY

## 2024-12-30 PROCEDURE — 2720000007 HC OR 272 NO HCPCS: Performed by: ORTHOPAEDIC SURGERY

## 2024-12-30 PROCEDURE — 73560 X-RAY EXAM OF KNEE 1 OR 2: CPT | Mod: LEFT SIDE | Performed by: STUDENT IN AN ORGANIZED HEALTH CARE EDUCATION/TRAINING PROGRAM

## 2024-12-30 PROCEDURE — 0SPD09Z REMOVAL OF LINER FROM LEFT KNEE JOINT, OPEN APPROACH: ICD-10-PCS | Performed by: ORTHOPAEDIC SURGERY

## 2024-12-30 PROCEDURE — 2500000004 HC RX 250 GENERAL PHARMACY W/ HCPCS (ALT 636 FOR OP/ED): Performed by: NURSE ANESTHETIST, CERTIFIED REGISTERED

## 2024-12-30 PROCEDURE — 3700000001 HC GENERAL ANESTHESIA TIME - INITIAL BASE CHARGE: Performed by: ORTHOPAEDIC SURGERY

## 2024-12-30 PROCEDURE — 99232 SBSQ HOSP IP/OBS MODERATE 35: CPT | Performed by: NURSE PRACTITIONER

## 2024-12-30 PROCEDURE — 87116 MYCOBACTERIA CULTURE: CPT | Performed by: FAMILY MEDICINE

## 2024-12-30 PROCEDURE — 2500000004 HC RX 250 GENERAL PHARMACY W/ HCPCS (ALT 636 FOR OP/ED): Performed by: NURSE PRACTITIONER

## 2024-12-30 PROCEDURE — 0SBD0ZZ EXCISION OF LEFT KNEE JOINT, OPEN APPROACH: ICD-10-PCS | Performed by: ORTHOPAEDIC SURGERY

## 2024-12-30 PROCEDURE — 2500000001 HC RX 250 WO HCPCS SELF ADMINISTERED DRUGS (ALT 637 FOR MEDICARE OP): Performed by: NURSE PRACTITIONER

## 2024-12-30 PROCEDURE — 73560 X-RAY EXAM OF KNEE 1 OR 2: CPT | Mod: LT

## 2024-12-30 PROCEDURE — 87102 FUNGUS ISOLATION CULTURE: CPT | Mod: GEALAB | Performed by: ORTHOPAEDIC SURGERY

## 2024-12-30 PROCEDURE — 64447 NJX AA&/STRD FEMORAL NRV IMG: CPT | Performed by: ANESTHESIOLOGY

## 2024-12-30 PROCEDURE — 0SUW09Z SUPPLEMENT LEFT KNEE JOINT, TIBIAL SURFACE WITH LINER, OPEN APPROACH: ICD-10-PCS | Performed by: ORTHOPAEDIC SURGERY

## 2024-12-30 PROCEDURE — 3E0U029 INTRODUCTION OF OTHER ANTI-INFECTIVE INTO JOINTS, OPEN APPROACH: ICD-10-PCS | Performed by: ORTHOPAEDIC SURGERY

## 2024-12-30 PROCEDURE — 99232 SBSQ HOSP IP/OBS MODERATE 35: CPT | Performed by: INTERNAL MEDICINE

## 2024-12-30 PROCEDURE — C1776 JOINT DEVICE (IMPLANTABLE): HCPCS | Performed by: ORTHOPAEDIC SURGERY

## 2024-12-30 PROCEDURE — C1763 CONN TISS, NON-HUMAN: HCPCS | Performed by: ORTHOPAEDIC SURGERY

## 2024-12-30 PROCEDURE — 80202 ASSAY OF VANCOMYCIN: CPT | Performed by: NURSE PRACTITIONER

## 2024-12-30 PROCEDURE — 2500000001 HC RX 250 WO HCPCS SELF ADMINISTERED DRUGS (ALT 637 FOR MEDICARE OP): Performed by: ORTHOPAEDIC SURGERY

## 2024-12-30 PROCEDURE — 3700000002 HC GENERAL ANESTHESIA TIME - EACH INCREMENTAL 1 MINUTE: Performed by: ORTHOPAEDIC SURGERY

## 2024-12-30 PROCEDURE — 3600000002 HC OR TIME - INITIAL BASE CHARGE - PROCEDURE LEVEL TWO: Performed by: ORTHOPAEDIC SURGERY

## 2024-12-30 DEVICE — STIMULAN KIT, RAPID CURE, 10CC: Type: IMPLANTABLE DEVICE | Site: KNEE | Status: FUNCTIONAL

## 2024-12-30 DEVICE — ATTUNE KNEE SYSTEM TIBIAL INSERT ROTATING PLATFORM POSTERIOR STABILIZED 8 5MM AOX
Type: IMPLANTABLE DEVICE | Site: KNEE | Status: FUNCTIONAL
Brand: ATTUNE

## 2024-12-30 RX ORDER — MORPHINE SULFATE 2 MG/ML
2 INJECTION, SOLUTION INTRAMUSCULAR; INTRAVENOUS
Status: DISCONTINUED | OUTPATIENT
Start: 2024-12-30 | End: 2025-01-03 | Stop reason: HOSPADM

## 2024-12-30 RX ORDER — ONDANSETRON HYDROCHLORIDE 2 MG/ML
INJECTION, SOLUTION INTRAVENOUS AS NEEDED
Status: DISCONTINUED | OUTPATIENT
Start: 2024-12-30 | End: 2024-12-30

## 2024-12-30 RX ORDER — LABETALOL HYDROCHLORIDE 5 MG/ML
INJECTION, SOLUTION INTRAVENOUS AS NEEDED
Status: DISCONTINUED | OUTPATIENT
Start: 2024-12-30 | End: 2024-12-30

## 2024-12-30 RX ORDER — TRAMADOL HYDROCHLORIDE 50 MG/1
50 TABLET ORAL EVERY 6 HOURS PRN
Status: DISCONTINUED | OUTPATIENT
Start: 2024-12-30 | End: 2025-01-03 | Stop reason: HOSPADM

## 2024-12-30 RX ORDER — MEPERIDINE HYDROCHLORIDE 25 MG/ML
12.5 INJECTION INTRAMUSCULAR; INTRAVENOUS; SUBCUTANEOUS EVERY 10 MIN PRN
Status: DISCONTINUED | OUTPATIENT
Start: 2024-12-30 | End: 2024-12-30 | Stop reason: HOSPADM

## 2024-12-30 RX ORDER — FENTANYL CITRATE 50 UG/ML
INJECTION, SOLUTION INTRAMUSCULAR; INTRAVENOUS AS NEEDED
Status: DISCONTINUED | OUTPATIENT
Start: 2024-12-30 | End: 2024-12-30

## 2024-12-30 RX ORDER — ACETAMINOPHEN 10 MG/ML
INJECTION, SOLUTION INTRAVENOUS AS NEEDED
Status: DISCONTINUED | OUTPATIENT
Start: 2024-12-30 | End: 2024-12-30

## 2024-12-30 RX ORDER — OXYCODONE HYDROCHLORIDE 5 MG/1
5 TABLET ORAL EVERY 4 HOURS PRN
Status: DISCONTINUED | OUTPATIENT
Start: 2024-12-30 | End: 2024-12-30 | Stop reason: HOSPADM

## 2024-12-30 RX ORDER — DIPHENHYDRAMINE HYDROCHLORIDE 50 MG/ML
12.5 INJECTION INTRAMUSCULAR; INTRAVENOUS ONCE AS NEEDED
Status: DISCONTINUED | OUTPATIENT
Start: 2024-12-30 | End: 2024-12-30 | Stop reason: HOSPADM

## 2024-12-30 RX ORDER — DROPERIDOL 2.5 MG/ML
0.62 INJECTION, SOLUTION INTRAMUSCULAR; INTRAVENOUS ONCE AS NEEDED
Status: DISCONTINUED | OUTPATIENT
Start: 2024-12-30 | End: 2024-12-30 | Stop reason: HOSPADM

## 2024-12-30 RX ORDER — SODIUM CHLORIDE, SODIUM LACTATE, POTASSIUM CHLORIDE, CALCIUM CHLORIDE 600; 310; 30; 20 MG/100ML; MG/100ML; MG/100ML; MG/100ML
100 INJECTION, SOLUTION INTRAVENOUS CONTINUOUS
Status: ACTIVE | OUTPATIENT
Start: 2024-12-30 | End: 2024-12-31

## 2024-12-30 RX ORDER — SODIUM CHLORIDE 0.9 G/100ML
IRRIGANT IRRIGATION AS NEEDED
Status: DISCONTINUED | OUTPATIENT
Start: 2024-12-30 | End: 2024-12-30 | Stop reason: HOSPADM

## 2024-12-30 RX ORDER — ASPIRIN 325 MG
325 TABLET, DELAYED RELEASE (ENTERIC COATED) ORAL 2 TIMES DAILY
Status: DISCONTINUED | OUTPATIENT
Start: 2024-12-30 | End: 2025-01-03 | Stop reason: HOSPADM

## 2024-12-30 RX ORDER — SODIUM CHLORIDE 0.9 % (FLUSH) 0.9 %
10 SYRINGE (ML) INJECTION AS NEEDED
Status: DISCONTINUED | OUTPATIENT
Start: 2024-12-30 | End: 2025-01-03 | Stop reason: HOSPADM

## 2024-12-30 RX ORDER — MIDAZOLAM HYDROCHLORIDE 1 MG/ML
INJECTION, SOLUTION INTRAMUSCULAR; INTRAVENOUS CONTINUOUS PRN
Status: DISCONTINUED | OUTPATIENT
Start: 2024-12-30 | End: 2024-12-30

## 2024-12-30 RX ORDER — PROPOFOL 10 MG/ML
INJECTION, EMULSION INTRAVENOUS AS NEEDED
Status: DISCONTINUED | OUTPATIENT
Start: 2024-12-30 | End: 2024-12-30

## 2024-12-30 RX ORDER — ONDANSETRON HYDROCHLORIDE 2 MG/ML
4 INJECTION, SOLUTION INTRAVENOUS ONCE AS NEEDED
Status: DISCONTINUED | OUTPATIENT
Start: 2024-12-30 | End: 2024-12-30 | Stop reason: HOSPADM

## 2024-12-30 RX ORDER — ALBUTEROL SULFATE 0.83 MG/ML
2.5 SOLUTION RESPIRATORY (INHALATION) ONCE AS NEEDED
Status: DISCONTINUED | OUTPATIENT
Start: 2024-12-30 | End: 2024-12-30 | Stop reason: HOSPADM

## 2024-12-30 RX ORDER — GENTAMICIN 40 MG/ML
INJECTION, SOLUTION INTRAMUSCULAR; INTRAVENOUS AS NEEDED
Status: DISCONTINUED | OUTPATIENT
Start: 2024-12-30 | End: 2024-12-30 | Stop reason: HOSPADM

## 2024-12-30 RX ORDER — HYDRALAZINE HYDROCHLORIDE 20 MG/ML
INJECTION INTRAMUSCULAR; INTRAVENOUS AS NEEDED
Status: DISCONTINUED | OUTPATIENT
Start: 2024-12-30 | End: 2024-12-30

## 2024-12-30 RX ORDER — ROCURONIUM BROMIDE 10 MG/ML
INJECTION, SOLUTION INTRAVENOUS AS NEEDED
Status: DISCONTINUED | OUTPATIENT
Start: 2024-12-30 | End: 2024-12-30

## 2024-12-30 RX ORDER — MIDAZOLAM HYDROCHLORIDE 1 MG/ML
INJECTION INTRAMUSCULAR; INTRAVENOUS AS NEEDED
Status: DISCONTINUED | OUTPATIENT
Start: 2024-12-30 | End: 2024-12-30

## 2024-12-30 RX ORDER — LIDOCAINE HYDROCHLORIDE 10 MG/ML
INJECTION, SOLUTION EPIDURAL; INFILTRATION; INTRACAUDAL; PERINEURAL AS NEEDED
Status: DISCONTINUED | OUTPATIENT
Start: 2024-12-30 | End: 2024-12-30

## 2024-12-30 RX ORDER — SODIUM CHLORIDE, SODIUM LACTATE, POTASSIUM CHLORIDE, CALCIUM CHLORIDE 600; 310; 30; 20 MG/100ML; MG/100ML; MG/100ML; MG/100ML
20 INJECTION, SOLUTION INTRAVENOUS CONTINUOUS
Status: CANCELLED | OUTPATIENT
Start: 2024-12-30 | End: 2024-12-31

## 2024-12-30 RX ORDER — VANCOMYCIN HYDROCHLORIDE 1 G/20ML
INJECTION, POWDER, LYOPHILIZED, FOR SOLUTION INTRAVENOUS AS NEEDED
Status: DISCONTINUED | OUTPATIENT
Start: 2024-12-30 | End: 2024-12-30 | Stop reason: HOSPADM

## 2024-12-30 RX ORDER — KETOROLAC TROMETHAMINE 30 MG/ML
15 INJECTION, SOLUTION INTRAMUSCULAR; INTRAVENOUS EVERY 6 HOURS
Status: COMPLETED | OUTPATIENT
Start: 2024-12-30 | End: 2025-01-01

## 2024-12-30 RX ORDER — LIDOCAINE HYDROCHLORIDE 10 MG/ML
5 INJECTION, SOLUTION EPIDURAL; INFILTRATION; INTRACAUDAL; PERINEURAL ONCE
Status: DISCONTINUED | OUTPATIENT
Start: 2024-12-30 | End: 2025-01-03 | Stop reason: HOSPADM

## 2024-12-30 RX ORDER — HYDROMORPHONE HYDROCHLORIDE 2 MG/ML
INJECTION, SOLUTION INTRAMUSCULAR; INTRAVENOUS; SUBCUTANEOUS AS NEEDED
Status: DISCONTINUED | OUTPATIENT
Start: 2024-12-30 | End: 2024-12-30

## 2024-12-30 RX ORDER — SUCCINYLCHOLINE CHLORIDE 20 MG/ML
INJECTION INTRAMUSCULAR; INTRAVENOUS AS NEEDED
Status: DISCONTINUED | OUTPATIENT
Start: 2024-12-30 | End: 2024-12-30

## 2024-12-30 RX ORDER — SODIUM CHLORIDE, SODIUM LACTATE, POTASSIUM CHLORIDE, CALCIUM CHLORIDE 600; 310; 30; 20 MG/100ML; MG/100ML; MG/100ML; MG/100ML
100 INJECTION, SOLUTION INTRAVENOUS CONTINUOUS
Status: DISCONTINUED | OUTPATIENT
Start: 2024-12-30 | End: 2024-12-30 | Stop reason: HOSPADM

## 2024-12-30 RX ADMIN — ROCURONIUM BROMIDE 10 MG: 10 INJECTION, SOLUTION INTRAVENOUS at 16:20

## 2024-12-30 RX ADMIN — LABETALOL HYDROCHLORIDE 10 MG: 5 INJECTION, SOLUTION INTRAVENOUS at 18:13

## 2024-12-30 RX ADMIN — ONDANSETRON 4 MG: 2 INJECTION, SOLUTION INTRAMUSCULAR; INTRAVENOUS at 16:13

## 2024-12-30 RX ADMIN — HYDROMORPHONE HYDROCHLORIDE 0.4 MG: 2 INJECTION, SOLUTION INTRAMUSCULAR; INTRAVENOUS; SUBCUTANEOUS at 16:50

## 2024-12-30 RX ADMIN — HYDRALAZINE HYDROCHLORIDE 10 MG: 20 INJECTION INTRAMUSCULAR; INTRAVENOUS at 17:57

## 2024-12-30 RX ADMIN — ASPIRIN 325 MG: 325 TABLET, COATED ORAL at 22:00

## 2024-12-30 RX ADMIN — MIDAZOLAM HYDROCHLORIDE 2 MG: 1 INJECTION, SOLUTION INTRAMUSCULAR; INTRAVENOUS at 15:12

## 2024-12-30 RX ADMIN — PROPOFOL 180 MG: 10 INJECTION, EMULSION INTRAVENOUS at 16:20

## 2024-12-30 RX ADMIN — SODIUM CHLORIDE, POTASSIUM CHLORIDE, SODIUM LACTATE AND CALCIUM CHLORIDE 100 ML/HR: 600; 310; 30; 20 INJECTION, SOLUTION INTRAVENOUS at 14:48

## 2024-12-30 RX ADMIN — VANCOMYCIN HYDROCHLORIDE 1000 MG: 1 INJECTION, SOLUTION INTRAVENOUS at 13:17

## 2024-12-30 RX ADMIN — HYDROMORPHONE HYDROCHLORIDE 0.5 MG: 1 INJECTION, SOLUTION INTRAMUSCULAR; INTRAVENOUS; SUBCUTANEOUS at 19:57

## 2024-12-30 RX ADMIN — ACETAMINOPHEN 1000 MG: 10 INJECTION, SOLUTION INTRAVENOUS at 18:09

## 2024-12-30 RX ADMIN — LOSARTAN POTASSIUM 100 MG: 50 TABLET, FILM COATED ORAL at 09:22

## 2024-12-30 RX ADMIN — SUCCINYLCHOLINE CHLORIDE 140 MG: 20 INJECTION, SOLUTION INTRAMUSCULAR; INTRAVENOUS at 16:22

## 2024-12-30 RX ADMIN — DEXAMETHASONE SODIUM PHOSPHATE 4 MG: 4 INJECTION INTRA-ARTICULAR; INTRALESIONAL; INTRAMUSCULAR; INTRAVENOUS; SOFT TISSUE at 16:25

## 2024-12-30 RX ADMIN — HYDROMORPHONE HYDROCHLORIDE 0.4 MG: 2 INJECTION, SOLUTION INTRAMUSCULAR; INTRAVENOUS; SUBCUTANEOUS at 16:37

## 2024-12-30 RX ADMIN — CEFTRIAXONE SODIUM 2 G: 2 INJECTION, SOLUTION INTRAVENOUS at 05:53

## 2024-12-30 RX ADMIN — KETOROLAC TROMETHAMINE 15 MG: 30 INJECTION, SOLUTION INTRAMUSCULAR at 22:00

## 2024-12-30 RX ADMIN — ROCURONIUM BROMIDE 20 MG: 10 INJECTION, SOLUTION INTRAVENOUS at 18:03

## 2024-12-30 RX ADMIN — FENTANYL CITRATE 50 MCG: 50 INJECTION, SOLUTION INTRAMUSCULAR; INTRAVENOUS at 17:39

## 2024-12-30 RX ADMIN — FENTANYL CITRATE 50 MCG: 50 INJECTION, SOLUTION INTRAMUSCULAR; INTRAVENOUS at 16:13

## 2024-12-30 RX ADMIN — SUGAMMADEX 200 MG: 100 INJECTION, SOLUTION INTRAVENOUS at 19:22

## 2024-12-30 RX ADMIN — ONDANSETRON 4 MG: 2 INJECTION, SOLUTION INTRAMUSCULAR; INTRAVENOUS at 18:32

## 2024-12-30 RX ADMIN — ROCURONIUM BROMIDE 50 MG: 10 INJECTION, SOLUTION INTRAVENOUS at 16:27

## 2024-12-30 RX ADMIN — LIDOCAINE HYDROCHLORIDE 50 MG: 10 SOLUTION INTRAVENOUS at 16:20

## 2024-12-30 RX ADMIN — FENTANYL CITRATE 50 MCG: 50 INJECTION, SOLUTION INTRAMUSCULAR; INTRAVENOUS at 16:16

## 2024-12-30 RX ADMIN — ROCURONIUM BROMIDE 20 MG: 10 INJECTION, SOLUTION INTRAVENOUS at 17:39

## 2024-12-30 RX ADMIN — PROPOFOL 20 MG: 10 INJECTION, EMULSION INTRAVENOUS at 19:18

## 2024-12-30 RX ADMIN — HYDROMORPHONE HYDROCHLORIDE 0.4 MG: 2 INJECTION, SOLUTION INTRAMUSCULAR; INTRAVENOUS; SUBCUTANEOUS at 17:20

## 2024-12-30 RX ADMIN — OXYCODONE HYDROCHLORIDE 5 MG: 5 TABLET ORAL at 22:00

## 2024-12-30 RX ADMIN — ROCURONIUM BROMIDE 20 MG: 10 INJECTION, SOLUTION INTRAVENOUS at 17:09

## 2024-12-30 RX ADMIN — LABETALOL HYDROCHLORIDE 5 MG: 5 INJECTION, SOLUTION INTRAVENOUS at 17:46

## 2024-12-30 RX ADMIN — LABETALOL HYDROCHLORIDE 5 MG: 5 INJECTION, SOLUTION INTRAVENOUS at 17:51

## 2024-12-30 RX ADMIN — SODIUM CHLORIDE, POTASSIUM CHLORIDE, SODIUM LACTATE AND CALCIUM CHLORIDE: 600; 310; 30; 20 INJECTION, SOLUTION INTRAVENOUS at 18:34

## 2024-12-30 RX ADMIN — AMLODIPINE BESYLATE 5 MG: 5 TABLET ORAL at 09:22

## 2024-12-30 SDOH — HEALTH STABILITY: MENTAL HEALTH: CURRENT SMOKER: 0

## 2024-12-30 ASSESSMENT — COGNITIVE AND FUNCTIONAL STATUS - GENERAL
CLIMB 3 TO 5 STEPS WITH RAILING: A LITTLE
HELP NEEDED FOR BATHING: A LITTLE
WALKING IN HOSPITAL ROOM: A LITTLE
DAILY ACTIVITIY SCORE: 21
DRESSING REGULAR LOWER BODY CLOTHING: A LOT
STANDING UP FROM CHAIR USING ARMS: A LITTLE
MOBILITY SCORE: 21

## 2024-12-30 ASSESSMENT — PAIN SCALES - GENERAL
PAINLEVEL_OUTOF10: 0 - NO PAIN
PAINLEVEL_OUTOF10: 0 - NO PAIN
PAINLEVEL_OUTOF10: 5 - MODERATE PAIN
PAINLEVEL_OUTOF10: 0 - NO PAIN
PAINLEVEL_OUTOF10: 7
PAINLEVEL_OUTOF10: 6
PAINLEVEL_OUTOF10: 0 - NO PAIN
PAINLEVEL_OUTOF10: 7
PAINLEVEL_OUTOF10: 5 - MODERATE PAIN

## 2024-12-30 ASSESSMENT — PAIN - FUNCTIONAL ASSESSMENT
PAIN_FUNCTIONAL_ASSESSMENT: 0-10

## 2024-12-30 ASSESSMENT — PAIN DESCRIPTION - DESCRIPTORS: DESCRIPTORS: TIGHTNESS

## 2024-12-30 ASSESSMENT — ACTIVITIES OF DAILY LIVING (ADL): LACK_OF_TRANSPORTATION: NO

## 2024-12-30 NOTE — PROGRESS NOTES
Vancomycin Dosing by Pharmacy- FOLLOW UP    Bj Prado is a 64 y.o. year old male who Pharmacy has been consulted for vancomycin dosing for osteomyelitis/septic arthritis. Based on the patient's indication and renal status this patient is being dosed based on a goal AUC of 400-600.     Renal function is currently stable.    Current vancomycin dose: 1000 mg given every 12 hours    Estimated vancomycin AUC on current dose: 426 mg/L.hr     Visit Vitals  /72 (BP Location: Right arm, Patient Position: Lying)   Pulse 76   Temp 36.4 °C (97.5 °F) (Temporal)   Resp 16        Lab Results   Component Value Date    CREATININE 0.99 2024    CREATININE 0.95 2024    CREATININE 1.17 2024    CREATININE 0.97 2023    CREATININE 1.04 2022    CREATININE 0.96 2022    CREATININE 0.94 2022        Patient weight is as follows:   Vitals:    24   Weight: 93.4 kg (206 lb)       Cultures:  No results found for the encounter in last 14 days.       I/O last 3 completed shifts:  In: 1480 (15.8 mL/kg) [P.O.:480; IV Piggyback:1000]  Out: - (0 mL/kg)   Weight: 93.4 kg   I/O during current shift:  No intake/output data recorded.    Temp (24hrs), Av.6 °C (97.9 °F), Min:36.4 °C (97.5 °F), Max:36.9 °C (98.4 °F)      Assessment/Plan    Within goal AUC range. Continue current vancomycin regimen.    This dosing regimen is predicted by [employer] to result in the following pharmacokinetic parameters:  Regimen: 1000 mg IV every 12 hours.  Start time: 11:25 on 2024  Exposure target: AUC24 (range)400-600 mg/L.hr   [id number]: 383 mg/L.hr  AUC24,ss: 426 mg/L.hr  Probability of AUC24 > 400: 58 %  Ctrough,ss: 14.9 mg/L  Probability of Ctrough,ss > 20: 24 %      The next level will be obtained on  at 6am. May be obtained sooner if clinically indicated.   Will continue to monitor renal function daily while on vancomycin and order serum creatinine at least every 48 hours if not already  ordered.  Follow for continued vancomycin needs, clinical response, and signs/symptoms of toxicity.       Roxi Tatum, RPh

## 2024-12-30 NOTE — CARE PLAN
The patient's goals for the shift include pt will have procedure today  The clinical goals for the shift include patient will have pain control

## 2024-12-30 NOTE — PROGRESS NOTES
"Bj Prado \"Gian\" is a 64 y.o. male on day 1 of admission presenting with No Principal Problem: There is no principal problem currently on the Problem List. Please update the Problem List and refresh..    Subjective   No acute overnight events.  Moderate pain in left knee.  Denies fever, chills.        Objective     Physical Exam  Vitals reviewed.   Constitutional:       Appearance: Normal appearance.   HENT:      Head: Normocephalic and atraumatic.   Eyes:      Conjunctiva/sclera: Conjunctivae normal.      Pupils: Pupils are equal, round, and reactive to light.   Cardiovascular:      Rate and Rhythm: Normal rate and regular rhythm.      Pulses: Normal pulses.   Pulmonary:      Effort: Pulmonary effort is normal.   Abdominal:      Palpations: Abdomen is soft.   Musculoskeletal:      Cervical back: Neck supple.      Comments: Well healed left knee incision, large suprapatellar effusion, thigh and calf supple, leg and foot warm and well perfused.  Left knee flexion 80 degrees, extension 0 degrees, quad strength 5/5, SILT S/S/SPN/DPN distributions, no foot drop, fully flexes and extends left ankle and foot    Skin:     General: Skin is warm and dry.      Capillary Refill: Capillary refill takes less than 2 seconds.   Neurological:      General: No focal deficit present.      Mental Status: He is alert and oriented to person, place, and time.   Psychiatric:         Mood and Affect: Mood normal.         Behavior: Behavior normal.         Thought Content: Thought content normal.         Judgment: Judgment normal.         Last Recorded Vitals  Blood pressure 144/72, pulse 76, temperature 36.4 °C (97.5 °F), temperature source Temporal, resp. rate 16, height 1.778 m (5' 10\"), weight 93.4 kg (206 lb), SpO2 95%.  Intake/Output last 3 Shifts:  I/O last 3 completed shifts:  In: 1480 (15.8 mL/kg) [P.O.:480; IV Piggyback:1000]  Out: - (0 mL/kg)   Weight: 93.4 kg     Relevant Results  XR knee left 3 views    Result Date: " 12/28/2024  Interpreted By:  Molina Snow, STUDY: XR KNEE LEFT 3 VIEWS; ;  12/28/2024 9:08 pm   INDICATION: Signs/Symptoms:knee pain swelling warmth.     COMPARISON: None. Left knee radiographs 08/12/2024.   ACCESSION NUMBER(S): [id number]   ORDERING CLINICIAN: KEAGAN SAHU   FINDINGS: There is left total knee arthroplasty prosthesis without evidence of periprosthetic fracture or loosening. There is moderate suprapatellar knee joint effusion similar to prior. There is  new or increased infrapatellar soft tissue swelling.       Left knee arthroplasty without evidence of periprosthetic fracture or loosening. There is moderate suprapatellar knee joint effusion. There is new infrapatellar soft tissue swelling. Clinical correlation with direct inspection is recommended.     MACRO: None   Signed by: Molina Snow 12/28/2024 9:45 PM Dictation workstation:   [id number]     Scheduled medications  amLODIPine, 5 mg, oral, Daily  cefTRIAXone, 2 g, intravenous, q24h  enoxaparin, 40 mg, subcutaneous, q24h  losartan, 100 mg, oral, Daily  pantoprazole, 40 mg, oral, Daily before breakfast  polyethylene glycol, 17 g, oral, Daily  vancomycin, 1,000 mg, intravenous, q12h      Continuous medications     PRN medications  PRN medications: acetaminophen **OR** acetaminophen **OR** acetaminophen, naloxone, ondansetron **OR** ondansetron, oxyCODONE, oxyCODONE, vancomycin  No results found for this or any previous visit (from the past 24 hours).      Assessment/Plan   Assessment & Plan  Knee effusion, left    64 year old male who underwent left total knee arthroplasty with Dr. Zelaya 8/12/24 now with left knee prosthesis infection.    - labs reviewed - culture with 4+ leukocytes   - planning for the OR today for incision and drainage, poly exchange with Dr. Shelby - case add on - patient to follow scheduled cases  - ID consulted - appreciate recs - continue Rocephin and Vanco  - WBAT LLE  - pain control and remainder of care  per primary    Discussed with Dr. hSelby         I spent 30 minutes in the professional and overall care of this patient.    Manju Hendricks, APRN-CNP

## 2024-12-30 NOTE — PROGRESS NOTES
12/30/24 0932   Discharge Planning   Living Arrangements Spouse/significant other   Support Systems Spouse/significant other;Children;Family members;Friends/neighbors   Assistance Needed A&OX4; independent with ADLs with no DME; drives; room air baseline and currently room air   Type of Residence Private residence   Number of Stairs to Enter Residence 2   Number of Stairs Within Residence 0  (bedroom and bathroom is on main floor)   Who is requesting discharge planning? Provider   Home or Post Acute Services In home services   Type of Home Care Services Home PT;Home OT;Home nursing visits   Expected Discharge Disposition Home H  (Pt will need home IV antibiotics. Attending physician needs to send infusion referral to  Home Health to confirm possible costs)   Financial Resource Strain   How hard is it for you to pay for the very basics like food, housing, medical care, and heating? Not hard   Housing Stability   In the last 12 months, was there a time when you were not able to pay the mortgage or rent on time? N   At any time in the past 12 months, were you homeless or living in a shelter (including now)? N   Transportation Needs   In the past 12 months, has lack of transportation kept you from medical appointments or from getting medications? no   In the past 12 months, has lack of transportation kept you from meetings, work, or from getting things needed for daily living? No
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
## 2024-12-30 NOTE — CARE PLAN
The patient's goals for the shift include  adequate pain control    The clinical goals for the shift include patient will not fall this shift      Problem: Pain  Goal: Takes deep breaths with improved pain control throughout the shift  Outcome: Progressing  Goal: Turns in bed with improved pain control throughout the shift  Outcome: Progressing  Goal: Walks with improved pain control throughout the shift  Outcome: Progressing  Goal: Performs ADL's with improved pain control throughout shift  Outcome: Progressing  Goal: Participates in PT with improved pain control throughout the shift  Outcome: Progressing  Goal: Free from opioid side effects throughout the shift  Outcome: Progressing  Goal: Free from acute confusion related to pain meds throughout the shift  Outcome: Progressing     Problem: Pain - Adult  Goal: Verbalizes/displays adequate comfort level or baseline comfort level  Outcome: Progressing     Problem: Safety - Adult  Goal: Free from fall injury  Outcome: Progressing     Problem: Discharge Planning  Goal: Discharge to home or other facility with appropriate resources  Outcome: Progressing     Problem: Chronic Conditions and Co-morbidities  Goal: Patient's chronic conditions and co-morbidity symptoms are monitored and maintained or improved  Outcome: Progressing

## 2024-12-30 NOTE — PROGRESS NOTES
"Physical Therapy                 Therapy Communication Note    Patient Name: Bj Prado \"Gian\"  MRN: [id number]  Department: [address]  Room: [facility]  Today's Date: 12/30/2024     Discipline: Physical Therapy    PT Missed Visit: Yes     Missed Visit Reason: Patient placed on medical hold. Pt is a 65 yo male presenting to [facility] on 12/28/24 with pain, swelling, and fevers s/p L TKA with Dr. Zelaya on 8/12/24. Per EMR, pt is planned for OR today for I&D, poly exchange with Dr. Shelby. Will hold PT eval this date and follow-up postoperatively.     Missed Time: Attempt    Comment: 0818  "

## 2024-12-30 NOTE — PROGRESS NOTES
Patient: Bj Prado  Room/bed: 103/103-A  Admitted on: 12/28/2024    Age: 64 y.o.   Gender: male  Code Status:  Full Code   Admitting Dx: Septic arthritis (Multi) [M00.9]  Knee effusion, left [M25.462]    MRN: [id number]  PCP: Keith Doe, DO       Subjective   Doing ok. Pain controlled. No sweats, chills overnight    Objective    Physical Exam  Constitutional:       Appearance: Normal appearance.   HENT:      Head: Normocephalic.      Nose: Nose normal.      Mouth/Throat:      Mouth: Mucous membranes are moist.   Eyes:      Extraocular Movements: Extraocular movements intact.      Pupils: Pupils are equal, round, and reactive to light.   Neck:      Comments: No jvd  Cardiovascular:      Pulses: Normal pulses.      Comments: Regular, S4  Pulmonary:      Effort: Pulmonary effort is normal.      Breath sounds: Normal breath sounds.   Abdominal:      General: Bowel sounds are normal.      Palpations: Abdomen is soft.   Musculoskeletal:      Comments: LLE with increased warmth to touch, from thigh down  Marked ballotable effusion of left knee. Very tender.    Skin:     General: Skin is warm and dry.   Neurological:      General: No focal deficit present.      Mental Status: He is alert.   Psychiatric:         Mood and Affect: Mood normal.         Behavior: Behavior normal.        Temp:  [36.4 °C (97.5 °F)-36.9 °C (98.4 °F)] 36.4 °C (97.5 °F)  Heart Rate:  [66-76] 76  Resp:  [14-18] 16  BP: (118-144)/(66-75) 144/72    Vitals:    12/28/24 1957   Weight: 93.4 kg (206 lb)           I/Os    Intake/Output Summary (Last 24 hours) at 12/30/2024 0857  Last data filed at 12/30/2024 0604  Gross per 24 hour   Intake 980 ml   Output --   Net 980 ml       Labs:   Results from last 72 hours   Lab Units 12/29/24  0735 12/28/24  2115   SODIUM mmol/L 136 140   POTASSIUM mmol/L 3.6 4.3   CHLORIDE mmol/L 104 103   CO2 mmol/L 25 24   BUN mg/dL 17 21   CREATININE mg/dL 0.99 0.95   GLUCOSE mg/dL 129* 121*   CALCIUM mg/dL 7.8* 9.0  "  ANION GAP mmol/L 11 17   EGFR mL/min/1.73m*2 85 89      Results from last 72 hours   Lab Units 12/29/24  0735 12/28/24  2115   WBC AUTO x10*3/uL 14.9* 17.7*   HEMOGLOBIN g/dL 12.3* 14.0   HEMATOCRIT % 37.5* 42.1   PLATELETS AUTO x10*3/uL 204 244   NEUTROS PCT AUTO %  --  87.0   LYMPHS PCT AUTO %  --  5.3   MONOS PCT AUTO %  --  6.8   EOS PCT AUTO %  --  0.1      Lab Results   Component Value Date    CALCIUM 7.8 (L) 12/29/2024      Lab Results   Component Value Date    CRP 2.12 (H) 12/28/2024      [unfilled]     Micro/ID:   No results found for the last 90 days.                   No lab exists for component: \"AGALPCRNB\"   .ID  Lab Results   Component Value Date    BLOODCULT Loaded on Instrument - Culture in progress 12/29/2024    BLOODCULT Loaded on Instrument - Culture in progress 12/29/2024       Images:  XR knee left 3 views  Narrative: Interpreted By:  Molina Snow,   STUDY:  XR KNEE LEFT 3 VIEWS; ;  12/28/2024 9:08 pm      INDICATION:  Signs/Symptoms:knee pain swelling warmth.          COMPARISON:  None. Left knee radiographs 08/12/2024.      ACCESSION NUMBER(S):  [id number]      ORDERING CLINICIAN:  KEAGAN SAHU      FINDINGS:  There is left total knee arthroplasty prosthesis without evidence of  periprosthetic fracture or loosening. There is moderate suprapatellar  knee joint effusion similar to prior. There is  new or increased  infrapatellar soft tissue swelling.      Impression: Left knee arthroplasty without evidence of periprosthetic fracture or  loosening. There is moderate suprapatellar knee joint effusion. There  is new infrapatellar soft tissue swelling. Clinical correlation with  direct inspection is recommended.          MACRO:  None      Signed by: Molina Snow 12/28/2024 9:45 PM  Dictation workstation:   [id number]       Meds    Scheduled medications  amLODIPine, 5 mg, oral, Daily  cefTRIAXone, 2 g, intravenous, q24h  enoxaparin, 40 mg, subcutaneous, q24h  losartan, 100 " mg, oral, Daily  pantoprazole, 40 mg, oral, Daily before breakfast  polyethylene glycol, 17 g, oral, Daily  vancomycin, 1,000 mg, intravenous, q12h      Continuous medications     PRN medications  PRN medications: acetaminophen **OR** acetaminophen **OR** acetaminophen, naloxone, ondansetron **OR** ondansetron, oxyCODONE, oxyCODONE, vancomycin     Assessment and Plan    Bj Prado is a 64 y.o. male   Acute left knee prosthesis infection. Original surgery performed in August. He will be going to OR late this afternoon for I&D, polyexchange with Dr Shelby. He has been afebrile. Fluid aspirate with a lot of white cells, no bacteria. Intra op cultures to be sent. Will need midline placement, HH etc before dc  Hypertension. Monitor pressure, has been running a little high, but ill, uncomfortable etc.   GERD. Ppi  DVT prophylaxis      Latrice Bray MD

## 2024-12-30 NOTE — ANESTHESIA PROCEDURE NOTES
Peripheral Block    Patient location during procedure: pre-op  Reason for block: at surgeon's request and post-op pain management  Staffing  Performed: attending   Authorized by: Girish Rai MD    Performed by: Girish Rai MD  Preanesthetic Checklist  Completed: patient identified, IV checked, site marked, risks and benefits discussed, surgical consent, monitors and equipment checked, pre-op evaluation and timeout performed   Timeout performed at:   Peripheral Block  Patient position: laying flat  Prep: ChloraPrep and site prepped and draped  Patient monitoring: heart rate and continuous pulse ox  Block type: adductor canal  Laterality: left  Injection technique: single-shot  Needle  Needle type: short-bevel   Needle gauge: 22 G  Needle length: 8 cm  Needle localization: anatomical landmarks, ultrasound guidance and nerve stimulator  Assessment  Injection assessment: negative aspiration for heme, no paresthesia on injection, incremental injection and local visualized surrounding nerve on ultrasound  Paresthesia pain: none  Heart rate change: no  Slow fractionated injection: yes  Additional Notes  30mL 0.5% Marcaine 1:300k epi 5mg decadron

## 2024-12-30 NOTE — PROGRESS NOTES
"Bj Prado \"Gian\" is a 64 y.o. male on day 1 of admission presenting with No Principal Problem: There is no principal problem currently on the Problem List. Please update the Problem List and refresh..      Subjective   Patient seen at bedside. He denies any acute distress.   OR later today for for I&D and poly exchange.          Objective     Last Recorded Vitals  /76   Pulse 65   Temp 36.4 °C (97.5 °F) (Temporal)   Resp 16   Wt 93.4 kg (206 lb)   SpO2 95%   Intake/Output last 3 Shifts:    Intake/Output Summary (Last 24 hours) at 12/30/2024 1225  Last data filed at 12/30/2024 0922  Gross per 24 hour   Intake 1420 ml   Output --   Net 1420 ml       Admission Weight  Weight: 93.4 kg (206 lb) (12/28/24 1957)    Daily Weight  12/28/24 : 93.4 kg (206 lb)    Image Results  XR knee left 3 views  Narrative: Interpreted By:  Molina Snow,   STUDY:  XR KNEE LEFT 3 VIEWS; ;  12/28/2024 9:08 pm      INDICATION:  Signs/Symptoms:knee pain swelling warmth.          COMPARISON:  None. Left knee radiographs 08/12/2024.      ACCESSION NUMBER(S):  [id number]      ORDERING CLINICIAN:  KEAGAN SAHU      FINDINGS:  There is left total knee arthroplasty prosthesis without evidence of  periprosthetic fracture or loosening. There is moderate suprapatellar  knee joint effusion similar to prior. There is  new or increased  infrapatellar soft tissue swelling.      Impression: Left knee arthroplasty without evidence of periprosthetic fracture or  loosening. There is moderate suprapatellar knee joint effusion. There  is new infrapatellar soft tissue swelling. Clinical correlation with  direct inspection is recommended.          MACRO:  None      Signed by: Molina Snow 12/28/2024 9:45 PM  Dictation workstation:   [id number]    Physical Exam  Constitutional:       Appearance: Normal appearance.   HENT:      Head: Normocephalic and atraumatic.      Mouth/Throat:      Mouth: Mucous membranes are moist.      Pharynx: " Oropharynx is clear.   Eyes:      Pupils: Pupils are equal, round, and reactive to light.   Cardiovascular:      Rate and Rhythm: Normal rate and regular rhythm.      Heart sounds: Normal heart sounds.   Pulmonary:      Effort: Pulmonary effort is normal.      Breath sounds: Normal breath sounds.   Abdominal:      General: Abdomen is flat. Bowel sounds are normal.      Palpations: Abdomen is soft.   Musculoskeletal:      Cervical back: Normal range of motion.      Comments: Lt knee effusion, minimal redness   Neurological:      Mental Status: He is alert.          Relevant Results    Results for orders placed or performed during the hospital encounter of 12/28/24 (from the past 24 hours)   Vancomycin   Result Value Ref Range    Vancomycin 10.4 5.0 - 20.0 ug/mL     XR knee left 3 views    Result Date: 12/28/2024  Interpreted By:  Molina Snow, STUDY: XR KNEE LEFT 3 VIEWS; ;  12/28/2024 9:08 pm   INDICATION: Signs/Symptoms:knee pain swelling warmth.     COMPARISON: None. Left knee radiographs 08/12/2024.   ACCESSION NUMBER(S): [id number]   ORDERING CLINICIAN: KEAGAN SAHU   FINDINGS: There is left total knee arthroplasty prosthesis without evidence of periprosthetic fracture or loosening. There is moderate suprapatellar knee joint effusion similar to prior. There is  new or increased infrapatellar soft tissue swelling.       Left knee arthroplasty without evidence of periprosthetic fracture or loosening. There is moderate suprapatellar knee joint effusion. There is new infrapatellar soft tissue swelling. Clinical correlation with direct inspection is recommended.     MACRO: None   Signed by: Molina Snow 12/28/2024 9:45 PM Dictation workstation:   [id number]       Scheduled medications  amLODIPine, 5 mg, oral, Daily  cefTRIAXone, 2 g, intravenous, q24h  enoxaparin, 40 mg, subcutaneous, q24h  losartan, 100 mg, oral, Daily  pantoprazole, 40 mg, oral, Daily before breakfast  polyethylene glycol, 17 g,  oral, Daily  vancomycin, 1,000 mg, intravenous, q12h      Continuous medications     PRN medications  PRN medications: acetaminophen **OR** acetaminophen **OR** acetaminophen, naloxone, ondansetron **OR** ondansetron, oxyCODONE, oxyCODONE, vancomycin      Assessment/Plan      jB Prado, rika 63 y/o M with PMHx of left total knee arthroplasty admitted for left knee prosthesis infection.     1)Left knee prosthesis infection   -Sterile fluid culture negative with + 4 PMN   -Blood cultures NGTD 1   -Plan for OR today for I&D and poly exchange. Need OR cultures.    -Continue Rocephin and Vancomycin   -Discussed with Dr. Pemberton             Attending note : the patient was evaluated, the note was reviewed and updated  Likely Lt knee prosthesis infection, the culture is pending, for I&D   Recommendations :  Continue Rocephin and Vancomycin     I spent minutes in the professional and overall care of this patient.        Janet Pemberton MD

## 2024-12-30 NOTE — ANESTHESIA PROCEDURE NOTES
Airway  Date/Time: 12/30/2024 4:23 PM  Urgency: elective    Airway not difficult    Staffing  Performed: CRNA   Authorized by: Girish Rai MD    Performed by: SHEREEN Herrera-CRNA  Patient location during procedure: OR    Indications and Patient Condition  Indications for airway management: anesthesia and airway protection  Spontaneous Ventilation: absent  Sedation level: deep  Preoxygenated: yes  Mask difficulty assessment: 0 - not attempted (patient had food at 0900)  Planned trial extubation    Final Airway Details  Final airway type: endotracheal airway      Successful airway: ETT  Cuffed: yes   Successful intubation technique: video laryngoscopy  Facilitating devices/methods: intubating stylet and cricoid pressure  Endotracheal tube insertion site: oral  Blade size: #4  ETT size (mm): 7.5  Cormack-Lehane Classification: grade I - full view of glottis  Placement verified by: chest auscultation, capnometry and palpation of cuff   Measured from: lips  ETT to lips (cm): 23  Number of attempts at approach: 1    Additional Comments  Easy intubation with Gill.  Teeth and lips in preanesthetic condition.

## 2024-12-31 ENCOUNTER — APPOINTMENT (OUTPATIENT)
Dept: RADIOLOGY | Facility: HOSPITAL | Age: 64
DRG: 487 | End: 2024-12-31
Payer: MEDICARE

## 2024-12-31 LAB
ANION GAP SERPL CALC-SCNC: 15 MMOL/L (ref 10–20)
BASOPHILS # BLD AUTO: 0.01 X10*3/UL (ref 0–0.1)
BASOPHILS NFR BLD AUTO: 0.1 %
BUN SERPL-MCNC: 17 MG/DL (ref 6–23)
CALCIUM SERPL-MCNC: 8.2 MG/DL (ref 8.6–10.3)
CHLORIDE SERPL-SCNC: 102 MMOL/L (ref 98–107)
CO2 SERPL-SCNC: 23 MMOL/L (ref 21–32)
CREAT SERPL-MCNC: 0.95 MG/DL (ref 0.5–1.3)
EGFRCR SERPLBLD CKD-EPI 2021: 89 ML/MIN/1.73M*2
EOSINOPHIL # BLD AUTO: 0 X10*3/UL (ref 0–0.7)
EOSINOPHIL NFR BLD AUTO: 0 %
ERYTHROCYTE [DISTWIDTH] IN BLOOD BY AUTOMATED COUNT: 13 % (ref 11.5–14.5)
GLUCOSE SERPL-MCNC: 127 MG/DL (ref 74–99)
HCT VFR BLD AUTO: 36.1 % (ref 41–52)
HGB BLD-MCNC: 11.8 G/DL (ref 13.5–17.5)
IMM GRANULOCYTES # BLD AUTO: 0.06 X10*3/UL (ref 0–0.7)
IMM GRANULOCYTES NFR BLD AUTO: 0.6 % (ref 0–0.9)
LYMPHOCYTES # BLD AUTO: 0.77 X10*3/UL (ref 1.2–4.8)
LYMPHOCYTES NFR BLD AUTO: 7.9 %
MCH RBC QN AUTO: 28.9 PG (ref 26–34)
MCHC RBC AUTO-ENTMCNC: 32.7 G/DL (ref 32–36)
MCV RBC AUTO: 88 FL (ref 80–100)
MONOCYTES # BLD AUTO: 1 X10*3/UL (ref 0.1–1)
MONOCYTES NFR BLD AUTO: 10.2 %
NEUTROPHILS # BLD AUTO: 7.94 X10*3/UL (ref 1.2–7.7)
NEUTROPHILS NFR BLD AUTO: 81.2 %
NRBC BLD-RTO: 0 /100 WBCS (ref 0–0)
PLATELET # BLD AUTO: 236 X10*3/UL (ref 150–450)
POTASSIUM SERPL-SCNC: 3.9 MMOL/L (ref 3.5–5.3)
RBC # BLD AUTO: 4.09 X10*6/UL (ref 4.5–5.9)
SODIUM SERPL-SCNC: 136 MMOL/L (ref 136–145)
STAPHYLOCOCCUS SPEC CULT: NORMAL
WBC # BLD AUTO: 9.8 X10*3/UL (ref 4.4–11.3)

## 2024-12-31 PROCEDURE — 36573 INSJ PICC RS&I 5 YR+: CPT

## 2024-12-31 PROCEDURE — 99232 SBSQ HOSP IP/OBS MODERATE 35: CPT | Performed by: NURSE PRACTITIONER

## 2024-12-31 PROCEDURE — 87070 CULTURE OTHR SPECIMN AEROBIC: CPT | Mod: GEALAB | Performed by: ORTHOPAEDIC SURGERY

## 2024-12-31 PROCEDURE — 1100000001 HC PRIVATE ROOM DAILY

## 2024-12-31 PROCEDURE — 97530 THERAPEUTIC ACTIVITIES: CPT | Mod: GP

## 2024-12-31 PROCEDURE — 80048 BASIC METABOLIC PNL TOTAL CA: CPT | Performed by: ORTHOPAEDIC SURGERY

## 2024-12-31 PROCEDURE — 85025 COMPLETE CBC W/AUTO DIFF WBC: CPT | Performed by: ORTHOPAEDIC SURGERY

## 2024-12-31 PROCEDURE — 2500000001 HC RX 250 WO HCPCS SELF ADMINISTERED DRUGS (ALT 637 FOR MEDICARE OP): Performed by: ORTHOPAEDIC SURGERY

## 2024-12-31 PROCEDURE — 87205 SMEAR GRAM STAIN: CPT | Mod: GEALAB | Performed by: ORTHOPAEDIC SURGERY

## 2024-12-31 PROCEDURE — 2500000004 HC RX 250 GENERAL PHARMACY W/ HCPCS (ALT 636 FOR OP/ED): Performed by: ORTHOPAEDIC SURGERY

## 2024-12-31 PROCEDURE — 36415 COLL VENOUS BLD VENIPUNCTURE: CPT | Performed by: ORTHOPAEDIC SURGERY

## 2024-12-31 PROCEDURE — 2780000003 HC OR 278 NO HCPCS

## 2024-12-31 PROCEDURE — 97161 PT EVAL LOW COMPLEX 20 MIN: CPT | Mod: GP

## 2024-12-31 PROCEDURE — 97116 GAIT TRAINING THERAPY: CPT | Mod: GP

## 2024-12-31 PROCEDURE — C1751 CATH, INF, PER/CENT/MIDLINE: HCPCS

## 2024-12-31 PROCEDURE — 97110 THERAPEUTIC EXERCISES: CPT | Mod: GP

## 2024-12-31 RX ORDER — LIDOCAINE HYDROCHLORIDE 10 MG/ML
5 INJECTION, SOLUTION INFILTRATION; PERINEURAL ONCE
Status: DISCONTINUED | OUTPATIENT
Start: 2024-12-31 | End: 2025-01-03 | Stop reason: HOSPADM

## 2024-12-31 RX ADMIN — ASPIRIN 325 MG: 325 TABLET, COATED ORAL at 21:41

## 2024-12-31 RX ADMIN — AMLODIPINE BESYLATE 5 MG: 5 TABLET ORAL at 09:00

## 2024-12-31 RX ADMIN — OXYCODONE HYDROCHLORIDE 5 MG: 5 TABLET ORAL at 18:15

## 2024-12-31 RX ADMIN — VANCOMYCIN HYDROCHLORIDE 1000 MG: 1 INJECTION, SOLUTION INTRAVENOUS at 00:59

## 2024-12-31 RX ADMIN — LOSARTAN POTASSIUM 100 MG: 50 TABLET, FILM COATED ORAL at 09:00

## 2024-12-31 RX ADMIN — KETOROLAC TROMETHAMINE 15 MG: 30 INJECTION, SOLUTION INTRAMUSCULAR at 09:00

## 2024-12-31 RX ADMIN — PANTOPRAZOLE SODIUM 40 MG: 40 TABLET, DELAYED RELEASE ORAL at 05:56

## 2024-12-31 RX ADMIN — KETOROLAC TROMETHAMINE 15 MG: 30 INJECTION, SOLUTION INTRAMUSCULAR at 21:41

## 2024-12-31 RX ADMIN — KETOROLAC TROMETHAMINE 15 MG: 30 INJECTION, SOLUTION INTRAMUSCULAR at 15:13

## 2024-12-31 RX ADMIN — VANCOMYCIN HYDROCHLORIDE 1000 MG: 1 INJECTION, SOLUTION INTRAVENOUS at 11:48

## 2024-12-31 RX ADMIN — KETOROLAC TROMETHAMINE 15 MG: 30 INJECTION, SOLUTION INTRAMUSCULAR at 04:25

## 2024-12-31 RX ADMIN — CEFTRIAXONE SODIUM 2 G: 2 INJECTION, SOLUTION INTRAVENOUS at 05:57

## 2024-12-31 RX ADMIN — ASPIRIN 325 MG: 325 TABLET, COATED ORAL at 09:00

## 2024-12-31 ASSESSMENT — COGNITIVE AND FUNCTIONAL STATUS - GENERAL
STANDING UP FROM CHAIR USING ARMS: A LITTLE
MOBILITY SCORE: 21
HELP NEEDED FOR BATHING: A LITTLE
MOBILITY SCORE: 21
DAILY ACTIVITIY SCORE: 23
DRESSING REGULAR LOWER BODY CLOTHING: A LOT
CLIMB 3 TO 5 STEPS WITH RAILING: A LITTLE
CLIMB 3 TO 5 STEPS WITH RAILING: A LITTLE
STANDING UP FROM CHAIR USING ARMS: A LITTLE
MOBILITY SCORE: 20
HELP NEEDED FOR BATHING: A LITTLE
WALKING IN HOSPITAL ROOM: A LITTLE
DAILY ACTIVITIY SCORE: 21
WALKING IN HOSPITAL ROOM: A LITTLE
CLIMB 3 TO 5 STEPS WITH RAILING: A LITTLE
MOVING TO AND FROM BED TO CHAIR: A LITTLE
CLIMB 3 TO 5 STEPS WITH RAILING: A LITTLE
MOBILITY SCORE: 20
MOVING TO AND FROM BED TO CHAIR: A LITTLE

## 2024-12-31 ASSESSMENT — PAIN - FUNCTIONAL ASSESSMENT
PAIN_FUNCTIONAL_ASSESSMENT: 0-10

## 2024-12-31 ASSESSMENT — ACTIVITIES OF DAILY LIVING (ADL)
ADL_ASSISTANCE: INDEPENDENT
ADLS_ADDRESSED: YES

## 2024-12-31 ASSESSMENT — PAIN SCALES - GENERAL
PAINLEVEL_OUTOF10: 3
PAINLEVEL_OUTOF10: 0 - NO PAIN
PAINLEVEL_OUTOF10: 0 - NO PAIN
PAINLEVEL_OUTOF10: 1

## 2024-12-31 ASSESSMENT — PAIN DESCRIPTION - DESCRIPTORS: DESCRIPTORS: ACHING

## 2024-12-31 NOTE — PROCEDURES
Patient given PICC informations sheets and all questions answered prior to signing consent.  Line placed using modified seldinger's technique.  Patient tolerated procedure well.  No complications.  Tip placement confirmed with 3CG technology.  Ok to use PICC line.  Please use all new bags and tubing prior to initiating IV therapy.

## 2024-12-31 NOTE — PROGRESS NOTES
"Occupational Therapy                 Therapy Communication Note    Patient Name: Bj Prado \"Gian\"  MRN: [id number]  Department: [address]  Room: [address] [place] [address]  Today's Date: 12/31/2024     Discipline: Occupational Therapy    Missed Visit Reason: Per discussion w/ PT, patient is independent w/ ADLs and has no acute OT needs at this time. Please reconsult if pt status changes. Will cancel OT orders at this time.    Missed Time: Attempt    "

## 2024-12-31 NOTE — PROGRESS NOTES
12/31/24 1601   Discharge Planning   Expected Discharge Disposition Home H  (cultures remain pending, once final ATB determined will need [facility] infusion referral placed by provider)

## 2024-12-31 NOTE — PROGRESS NOTES
Gian Prado is a 64 y.o. male on day 2 of admission presenting with No Principal Problem: There is no principal problem currently on the Problem List. Please update the Problem List and refresh..    Subjective   Interval History: low grade temp, no new complaints      Review of Systems    Objective   Range of Vitals (last 24 hours)  Heart Rate:  [57-71]   Temp:  [36.3 °C (97.3 °F)-37.9 °C (100.2 °F)]   Resp:  [12-20]   BP: (110-149)/(56-79)   SpO2:  [94 %-100 %]   Daily Weight  12/28/24 : 93.4 kg (206 lb)    Body mass index is 29.56 kg/m².    Physical Exam  Constitutional:       Appearance: Normal appearance.   HENT:      Head: Normocephalic and atraumatic.      Mouth/Throat:      Mouth: Mucous membranes are moist.      Pharynx: Oropharynx is clear.   Eyes:      Pupils: Pupils are equal, round, and reactive to light.   Cardiovascular:      Rate and Rhythm: Normal rate and regular rhythm.      Heart sounds: Normal heart sounds.   Pulmonary:      Effort: Pulmonary effort is normal.      Breath sounds: Normal breath sounds.   Abdominal:      General: Abdomen is flat. Bowel sounds are normal.      Palpations: Abdomen is soft.   Musculoskeletal:      Cervical back: Normal range of motion.      Comments: Lt leg dressing   Neurological:      Mental Status: He is alert.         Antibiotics  cefTRIAXone - 2 gram/50 mL  vancomycin - 1 gram/200 mL    Relevant Results  Labs  Results from last 72 hours   Lab Units 12/31/24  0537 12/29/24  0735 12/28/24  2115   WBC AUTO x10*3/uL 9.8 14.9* 17.7*   HEMOGLOBIN g/dL 11.8* 12.3* 14.0   HEMATOCRIT % 36.1* 37.5* 42.1   PLATELETS AUTO x10*3/uL 236 204 244   NEUTROS PCT AUTO % 81.2  --  87.0   LYMPHS PCT AUTO % 7.9  --  5.3   MONOS PCT AUTO % 10.2  --  6.8   EOS PCT AUTO % 0.0  --  0.1     Results from last 72 hours   Lab Units 12/31/24  0537 12/29/24  0735 12/28/24  2115   SODIUM mmol/L 136 136 140   POTASSIUM mmol/L 3.9 3.6 4.3   CHLORIDE mmol/L 102 104 103   CO2 mmol/L 23 25 24   BUN  mg/dL 17 17 21   CREATININE mg/dL 0.95 0.99 0.95   GLUCOSE mg/dL 127* 129* 121*   CALCIUM mg/dL 8.2* 7.8* 9.0   ANION GAP mmol/L 15 11 17   EGFR mL/min/1.73m*2 89 85 89         Estimated Creatinine Clearance: 89 mL/min (by C-G formula based on SCr of 0.95 mg/dL).  C-Reactive Protein   Date Value Ref Range Status   12/28/2024 2.12 (H) <1.00 mg/dL Final     Microbiology  Susceptibility data from last 14 days.  Collected Specimen Info Organism   12/28/24 Fluid from Synovial Staphylococcus lugdunensis   Imaging  Reviewed        Assessment/Plan     Left knee prosthesis infection, sp I&D / poly exchange, the culture is pending      Recommendations :  Continue Rocephin and Vancomycin  Discussed with the medical team     I spent minutes in the professional and overall care of this patient.      Janet Pemberton MD

## 2024-12-31 NOTE — OP NOTE
"Incision and Drainage Knee (L) Operative Note     Date: 2024  OR Location: [facility] OR    Name: Bj Prado \"Gian\", : 1960, Age: 64 y.o., MRN: [id number], Sex: male    Diagnosis  Pre-op Diagnosis      * Infection of total left knee replacement, initial encounter (CMS-HCC) [T84.54XA] Post-op Diagnosis     * Infection of total left knee replacement, initial encounter (CMS-HCC) [T84.54XA]     Procedures  Incision and Drainage Knee  60668 - CA ARTHRT KNE W/EXPL DRG/RMVL FB    CA REVJ TOTAL KNEE ARTHRP W/WO ALGRFT 1 COMPONENT [68563]  CA ARTHRT KNE W/EXPL DRG/RMVL FB [79910]  CA NEG PRESSURE WOUND THERAPY NON DME <= 50 SQ CM [85660]  Surgeons      * Rylan Shelby - Primary    Resident/Fellow/Other Assistant:  NINA Mercado    Staff:   Circulator: Crista Carvalhoub Person: Stew  RNFA: Rogelio  Relief Circulator: Nimisha  Surgical Assistant: Arjun  Circulator: Jules Reeder Person: Chuck    Anesthesia Staff: Anesthesiologist: Girish Rai MD  CRNA: SHEREEN Herrera-CRNA    Procedure Summary  Anesthesia: Regional, General With endotracheal intubation  ASA: III  Estimated Blood Loss: 50 mL  Intra-op Medications:   Administrations occurring from 1509 to 1659 on 24:   Medication Name Total Dose   vancomycin (Vancocin) vial for injection 1 g   gentamicin (Garamycin) injection 160 mg   dexAMETHasone (Decadron) injection 4 mg/mL 4 mg   fentaNYL (Sublimaze) injection 50 mcg/mL 100 mcg   HYDROmorphone (Dilaudid) injection 2 mg/mL 0.8 mg   lidocaine PF (Xylocaine-MPF) local injection 1 % 50 mg   midazolam PF (Versed) injection 1 mg/mL 2 mg   ondansetron (Zofran) 2 mg/mL injection 4 mg   propofol (Diprivan) injection 10 mg/mL 180 mg   rocuronium (ZeMuron) 50 mg/5 mL injection 60 mg   succinylcholine (Anectine) 20 mg/mL injection 140 mg              Anesthesia Record               Intraprocedure I/O Totals          Intake    lactated Ringer's infusion 1100.00 mL    Total Intake 1100 mL       Output    Est. Blood Loss 50 " "mL    Total Output 50 mL       Net    Net Volume 1050 mL          Specimen:   ID Type Source Tests Collected by Time   1 : PRE-PATELLA BURSA LEFT Tissue BURSA SURGICAL PATHOLOGY EXAM Rylan Shelby, DO 12/30/2024 1704   2 : LEFT KNEE CAPSULE Tissue BURSA SURGICAL PATHOLOGY EXAM Nimisha Acosta, LASHON 12/30/2024 1724   3 : LEFT KNEE SYNOVIUM Tissue BURSA SURGICAL PATHOLOGY EXAM Nimisha Acosta, LASHON 12/30/2024 1730   A : L KNEE TIBIAL TRAY Swab BURSA FUNGAL CULTURE/SMEAR, TISSUE/WOUND CULTURE/SMEAR Nimisha Acosta, LASHON 12/30/2024 1753   B : L KNEE FEMORAL COMPONENT Swab BURSA FUNGAL CULTURE/SMEAR, TISSUE/WOUND CULTURE/SMEAR Nimisha Acosta, LASHON 12/30/2024 1754   C : L KNEE PATELLA BUTTON Swab BURSA FUNGAL CULTURE/SMEAR, TISSUE/WOUND CULTURE/SMEAR Nimisha Acosta, LASHON 12/30/2024 1758   D : L KNEE POLYETHLENE IMPLANT Tissue KNEE HARDWARE LEFT AFB CULTURE/SMEAR, FUNGAL CULTURE/SMEAR, TISSUE/WOUND CULTURE/SMEAR Nimisha Acosta, LASHON 12/30/2024 1809                 Drains and/or Catheters: * None in log *    Tourniquet Times:     Total Tourniquet Time Documented:  Thigh (Left) - 103 minutes  Total: Thigh (Left) - 103 minutes      Implants:  Implants       Type Name Action Serial No.      Graft STIMULAN KIT, RAPID CURE, 10CC - [id number] Implanted      Joint Knee INSERT, ATTUNE PS RP, SZ 8, 5MM - [id number] Implanted               Findings: See dictation below    Indications: Bj Prado \"Gian\" is an 64 y.o. male who is having surgery for Left TKA infection.  In the form of a left knee arthrotomy incision irrigation debridement, polyethylene exchange, placement of Stimulan dissolvable antibiotic beads and application negative pressure wound therapy dressing    The patient was seen in the preoperative area. The risks, benefits, complications, treatment options, non-operative alternatives, expected recovery and outcomes were discussed with the patient. The possibilities of reaction to medication, pulmonary aspiration, injury to surrounding structures, " bleeding, recurrent infection, the need for additional procedures, failure to diagnose a condition, and creating a complication requiring transfusion or operation were discussed with the patient. The patient concurred with the proposed plan, giving informed consent.  The site of surgery was properly noted/marked if necessary per policy. The patient has been actively warmed in preoperative area. Preoperative antibiotics have been ordered and given within 1 hours of incision. Venous thrombosis prophylaxis have been ordered including unilateral sequential compression device    Procedure Details: Date of surgery: 12/30/2024    [facility]: [facility]    Pre-Operative Diagnosis: Infected left total knee arthroplasty status post primary left total knee arthroplasty performed by Dr. Clif Zelaya on 8/12/2024    Post-Operative Diagnosis: Infected left total knee arthroplasty status post primary left total knee arthroplasty performed by Dr. Clif Pelletier on 8/12/2024    Procedure: Left knee arthrotomy with incision irrigation and extensive debridement with polyethylene exchange, placement of stimulant dissolvable antibiotic beads and application of negative pressure wound therapy dressing    Implants:  1.  DePuy attune AOX rotating platform/posterior stabilized polyethylene-size 8 x 5 mm thickness  2.  Stimulant calcium sulfate antibiotic beads-loaded with vancomycin and gentamicin  3.  KCI Prevena plus incisional wound VAC/negative pressure wound therapy dressing    Surgeon: Rylan Shelby DO    First Assistant: NINA Mercado    Intraoperative pathology and findings/operative indications:  Patient is a pleasant 64-year-old male who underwent an elective left total knee arthroplasty performed by Dr. Clif Zelaya on 8/12/2024.  The patient had a routine uncomplicated preoperative, intraoperative and immediate postoperative surgical course.  Postoperatively during follow-up it was noted that he had  significant residual swelling in the prepatellar bursal space.  Roughly 2 months ago as this failed to dissipate aspiration was performed sent for culture which was negative for any growth.  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  During this period of time the patient had no pain and no constitutional symptoms and otherwise did quite well with the exception of a cosmetic defect.  Beginning this past Friday night 12/27/2024 the patient began having pain in his knee which got progressively worse ultimately resulting in a trip to the emergency room on Saturday, 12/28/2024.  At this time some early erythema was identified very large prepatellar bursal fluid collection was identified no obvious joint effusion however the patient had profound pain.  Attempted aspiration of the joint itself was made with palpatory technique which was reasonably unsuccessful aspiration of the prepatellar bursal fluid revealed 80 cc of a very dark brown cloudy appearing synovial fluid.  A defect was easily palpable in the VMO.  Given surgical report of mid vastus arthrotomy it was felt that there was likely defect in the vastus medialis from the arthrotomy site leaking fluid into the prepatellar bursal space as such fluid from the prepatellar bursal space was felt to be consistent with intra-articular fluid.  Fluid analysis demonstrated greater than 20,000 white blood cells greater than 90% neutrophils with elevated CRP however ESR was within normal limits.  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  Treatment options were discussed patient was agreeable to moving forward with surgical intervention in the form of a left knee arthrotomy incision irrigation extensive  debridement, polyethylene exchange placement of antibiotic dissolvable beads and application of negative pressure wound therapy dressing.  At the time of the procedure the skin incision was found to be well-healed very large prepatellar bursal fluid collection was identified again dark brown and purulent in nature.  Pseudomembrane had formed in the prepatellar bursal space.  Defect in the VMO from the previous mid vastus arthrotomy was identified the remainder of the arthrotomy was very well-healed.  Intra-articularly beet red irritated synovium with a very thick pseudocapsule was identified along the medial and lateral gutter of the knee retropatellar space and suprapatellar pouch with loculations of inflammatory exudate throughout the joint.  The implants were found to be very well-fixed and well-positioned.  Extensor mechanism was intact.    Procedure in detail:  The patient was correctly identified marked in preoperative holding risk benefits alternatives reasonable expectations for outcomes have previously been discussed.  Also in preoperative holding the patient received regional nerve blocks by the department of anesthesia.  The patient was then escorted to operative [facility]. 2 at [facility] once in the operative suite surgical pause/amount was performed preoperative antibiotics were held as the patient was already on scheduled antibiotics on the floor which were started after fluid sample of been obtained in the emergency department Saturday night.  Patient was positioned supine on [facility] operative table bony prominences well-padded nonsterile tourniquet applied left proximal thigh patient underwent general anesthetic with endotracheal intubation.  Left knee and lower extremity then sterilely prepped and draped in standard fashion anatomic and marks identified marked with sterile marking pen Esmarch bandage was applied knee was flexed tourniquet was raised to 250 mmHg.  At this time the curvilinear  anteromedial incision was reopened extending it proximally and distally by approximately 3 cm in each direction.  Careful dissection through subcutaneous tissues revealed large fluid-filled prepatellar bursal sac which was then incised.  Was evacuated roughly 100 cc evacuated.  At this time medial and lateral subcutaneous flaps were elevated sharply with a 15 blade pseudomembrane in the prepatellar space was then identified and sharply excised with Bovie cautery.  Curette utilized to remove any residual pseudomembrane prebursal tissue on the superficial surface of the quadriceps and patellar tendon.  At this time a fresh 10 blade was used to make a standard medial parapatellar arthrotomy.  Pseudomembrane along the medial and lateral gutters of the knee sharply excised with Bovie cautery suprapatellar synovium sharply excised with Bovie cautery.  Pseudomembrane from the prepatellar bursal space, medial and lateral gutters and synovium from the suprapatellar pouch were all sent to specimen for culture.  At this time extensive debridement performed with Bovie cautery and rongeur removing all inflamed visible tissue.  At this time patella was everted knee was hyperflexed tibia was translated anteriorly and posterior stabilized rotating platform polyethylene was then removed.  Debridement of the posterior recess of the knee was then performed with a curette and rongeur.  At this time prior to the beginning of irrigation culture swabs were obtained of the tibial tray, femoral component and polyethylene patella component.  Once extensive debridement had been completed the wound was then copiously irrigated with back to sure via pulse lavage followed by 3 L of sterile saline via pulse lavage.  At this time dilute Betadine was poured into the wound and the wound was packed with a sterile sponge.  One-person remained dirty holding the leg all the remainder of the team broke scrub and transition to new gowns new drapes new  gloves.  Fresh draping of the limb was then performed over top of the old drapes and the light handles knee pulse lavage knee Bovie cautery new suction were all utilized.  Previously packed Betadine soaked sponges were removed from the knee with a hemostat and then dropped into the pick bucket.  At this time the wound was once again copiously irrigated with back to sure followed by 1.5 L of sterile saline via pulse lavage.  This time the knee was hyperflexed again utilizing all knee retractors tibia subluxed anteriorly a fresh/brand-new 8 x 5 mm rotating platform posterior stabilized polyethylene was then inserted knee was reduced and taken terminal extension.  Knee was then once again copiously irrigated with dilute Betadine allowed to soak x 3 minutes followed by copious irrigation with sterile saline for another 1.5 L.  At this time Stimulan antibiotic beads loaded with vancomycin and gentamicin were placed inside the knee joint.  Medial parapatellar arthrotomy was then closed with a #1 PDS in interrupted figure-of-eight fashion.  Defect in the vastus medialis which had previously been debrided to a fresh bleeding edge was then closed with #1 PDS in interrupted figure-of-eight and running locked fashion.  The remainder of the medial parapatellar arthrotomy was then closed with a size 0 looped PDS in running locked segmental fashion.  Knee was then taken through range of motion 0 degrees of extension flexion to 135 degrees watertight arthrotomy closure watertight closure of the vastus medialis had been achieved.  At this time skin was closed with 2-0 PDS in buried interrupted fashion with multiple spot welding sutures between subcutaneous tissue and underlying fascial plane to close the associated dead space.  Skin then reapproximated with 2-0 Prolene in interrupted vertical mattress fashion.  At this time a KCI Prevena plus negative pressure wound therapy dressing was applied over top of the incision followed by  application of bulky Chandra dressing and gentle compressive Ace wrap.  Patient was then awoken, extubated, transferred to hospital bed and returned to PACU in stable condition.  Counts were correct case was dirty hopefully converted to clean complications were none specimens included #1 left knee prepatellar bursal space pseudomembrane for culture, #2 left knee pseudocapsule for culture, #3 left knee synovium for culture, #4 left knee tibial tray component swab for culture #5 left knee polyethylene patella component swab for culture and #6 left knee femoral component swab for culture.  Estimated blood loss was 50 cc tourniquet time was 103 minutes.      Complications:  None; patient tolerated the procedure well.    Disposition: PACU - hemodynamically stable.  Condition: stable         Task Performed by RNFA or Surgical Assistant:  Retractor placement, limb positioning, visualization, suction utilization as well as to facilitate the safety and efficiency of the procedure          Additional Details:     Attending Attestation: I performed the procedure.    Rylan Shelby  Phone Number: [phone]

## 2024-12-31 NOTE — PROGRESS NOTES
"Bj Prado \"Gian\" is a 64 y.o. male on day 2 of admission presenting with No Principal Problem: There is no principal problem currently on the Problem List. Please update the Problem List and refresh..    Subjective   Patient seen and examined postoperative day 1 status post left knee incision irrigation debridement polyethylene exchange placement of dissolvable antibiotic beads and application of negative pressure wound therapy dressing secondary to infected left total knee arthroplasty.  Patient denies fevers chills nausea vomiting or constitutional symptoms currently pain very well-controlled       Objective     Physical Exam  Bandage left knee clean dry intact no drainage.  Negative pressure wound therapy dressing intact good suction seal no output as of yet.  Bilateral calf soft supple nontender negative Homans 5 x 5 dorsi and plantarflexion strength.  Patient is able to straight leg raise with 4+/5 quadriceps recruitment strength.  Last Recorded Vitals  Blood pressure 145/76, pulse 57, temperature 36.3 °C (97.3 °F), temperature source Temporal, resp. rate 15, height 1.778 m (5' 10\"), weight 93.4 kg (206 lb), SpO2 98%.  Intake/Output last 3 Shifts:  I/O last 3 completed shifts:  In: 2830 (30.3 mL/kg) [P.O.:880; I.V.:1200 (12.8 mL/kg); IV Piggyback:750]  Out: 675 (7.2 mL/kg) [Urine:625 (0.2 mL/kg/hr); Blood:50]  Weight: 93.4 kg     Relevant Results      Scheduled medications  amLODIPine, 5 mg, oral, Daily  aspirin, 325 mg, oral, BID  cefTRIAXone, 2 g, intravenous, q24h  ketorolac, 15 mg, intravenous, q6h  lidocaine, 5 mL, infiltration, Once  losartan, 100 mg, oral, Daily  pantoprazole, 40 mg, oral, Daily before breakfast  polyethylene glycol, 17 g, oral, Daily  vancomycin, 1,000 mg, intravenous, q12h      Continuous medications  lactated Ringer's, 100 mL/hr, Last Rate: Stopped (12/30/24 1929)      PRN medications  PRN medications: acetaminophen **OR** acetaminophen **OR** acetaminophen, morphine, naloxone, " ondansetron **OR** ondansetron, oxyCODONE, oxyCODONE, sodium chloride 0.9%, sodium chloride 0.9%, traMADol, vancomycin  Results for orders placed or performed during the hospital encounter of 12/28/24 (from the past 24 hours)   Basic Metabolic Panel   Result Value Ref Range    Glucose 127 (H) 74 - 99 mg/dL    Sodium 136 136 - 145 mmol/L    Potassium 3.9 3.5 - 5.3 mmol/L    Chloride 102 98 - 107 mmol/L    Bicarbonate 23 21 - 32 mmol/L    Anion Gap 15 10 - 20 mmol/L    Urea Nitrogen 17 6 - 23 mg/dL    Creatinine 0.95 0.50 - 1.30 mg/dL    eGFR 89 >60 mL/min/1.73m*2    Calcium 8.2 (L) 8.6 - 10.3 mg/dL   CBC and Auto Differential   Result Value Ref Range    WBC 9.8 4.4 - 11.3 x10*3/uL    nRBC 0.0 0.0 - 0.0 /100 WBCs    RBC 4.09 (L) 4.50 - 5.90 x10*6/uL    Hemoglobin 11.8 (L) 13.5 - 17.5 g/dL    Hematocrit 36.1 (L) 41.0 - 52.0 %    MCV 88 80 - 100 fL    MCH 28.9 26.0 - 34.0 pg    MCHC 32.7 32.0 - 36.0 g/dL    RDW 13.0 11.5 - 14.5 %    Platelets 236 150 - 450 x10*3/uL    Neutrophils % 81.2 40.0 - 80.0 %    Immature Granulocytes %, Automated 0.6 0.0 - 0.9 %    Lymphocytes % 7.9 13.0 - 44.0 %    Monocytes % 10.2 2.0 - 10.0 %    Eosinophils % 0.0 0.0 - 6.0 %    Basophils % 0.1 0.0 - 2.0 %    Neutrophils Absolute 7.94 (H) 1.20 - 7.70 x10*3/uL    Immature Granulocytes Absolute, Automated 0.06 0.00 - 0.70 x10*3/uL    Lymphocytes Absolute 0.77 (L) 1.20 - 4.80 x10*3/uL    Monocytes Absolute 1.00 0.10 - 1.00 x10*3/uL    Eosinophils Absolute 0.00 0.00 - 0.70 x10*3/uL    Basophils Absolute 0.01 0.00 - 0.10 x10*3/uL                            Assessment/Plan   Assessment & Plan  Knee effusion, left    Impression:  1.  Postoperative day #1 status post left knee incision irrigation debridement polyethylene exchange placement of antibiotic beads and application negative pressure wound therapy dressing    Recommendations:  PT/OT-weightbearing as tolerated  Continue negative pressure wound therapy dressing  Aspirin sequentials early  ambulation for DVT prophylaxis  Antibiotics per infectious disease currently on broad-spectrum follow-up cultures patient will likely require PICC line  Continue current pain control regimen  Encourage incentive spirometry attenuations every hour while awake       I spent 15 minutes in the professional and overall care of this patient.      Rylan Shelby, DO

## 2024-12-31 NOTE — PROGRESS NOTES
"Physical Therapy    Physical Therapy Treatment    Patient Name: Bj Prado \"Kyacee"  MRN: [id number]  Department: [address]  Room: [address] [place] [address]  Today's Date: 12/31/2024  Time Calculation  Start Time: 1150  Stop Time: 1215  Time Calculation (min): 25 min         Assessment/Plan   PT Assessment  PT Assessment Results: Decreased strength, Decreased endurance, Impaired balance, Decreased mobility, Decreased range of motion  Rehab Prognosis: Excellent  Barriers to Discharge Home: No anticipated barriers  Evaluation/Treatment Tolerance: Patient tolerated treatment well  Medical Staff Made Aware: Yes  Strengths: Ability to acquire knowledge, Housing layout, Insight into problems, Support of Caregivers  End of Session Communication: Bedside nurse  Assessment Comment: Patient very pleasant and cooperative. Motivated to improve, strong family support, owns all necessary equipment. Patient would benefit from LOW intensity PT intervention.  End of Session Patient Position: Up in chair, Alarm off, not on at start of session (no alarm necessary per staff, all needs within reach, dtr at bedside)     PT Plan  Treatment/Interventions: Bed mobility, Transfer training, Gait training, Stair training, Balance training, Strengthening, Endurance training, Therapeutic exercise  PT Plan: Ongoing PT  PT Frequency: 4 times per week  PT Discharge Recommendations: Low intensity level of continued care  Equipment Recommended upon Discharge: Wheeled walker (own)  PT Recommended Transfer Status: Assist x1  PT - OK to Discharge: Yes (per PT POC)      General Visit Information:   PT  Visit  PT Received On: 12/31/24  Response to Previous Treatment: Patient with no complaints from previous session.  General  Reason for Referral: Impaired functional mobility; L knee I&D  Referred By: Jules Jones  Past Medical History Relevant to Rehab: GERD, HTN, L knee OA with TKA 8/2024  Family/Caregiver Present: Yes (SO)  Prior to Session Communication: Bedside " nurse  Patient Position Received: Bed, 3 rail up, Alarm on  General Comment: Patient pleasant, cooperative and agreeable to PT tx    Subjective   Precautions:  Precautions  LE Weight Bearing Status: Weight Bearing as Tolerated  Medical Precautions: Fall precautions (wound vac L knee)  Precautions Comment: 12/30: I&D L knee, abx beads placed, poly exchange, wound vac placed    Objective   Pain:  Pain Assessment  Pain Assessment: 0-10  0-10 (Numeric) Pain Score: 0 - No pain  Cognition:  Cognition  Overall Cognitive Status: Within Functional Limits  Orientation Level: Oriented X4  Coordination:  Movements are Fluid and Coordinated: Yes  Postural Control:  Postural Control  Postural Control: Within Functional Limits  Static Sitting Balance  Static Sitting-Balance Support: No upper extremity supported, Feet supported  Static Sitting-Level of Assistance: Independent  Dynamic Sitting Balance  Dynamic Sitting-Balance Support: No upper extremity supported, Feet supported  Dynamic Sitting-Level of Assistance: Independent  Dynamic Sitting-Balance: Forward lean  Static Standing Balance  Static Standing-Balance Support: Bilateral upper extremity supported  Static Standing-Level of Assistance: Close supervision  Dynamic Standing Balance  Dynamic Standing-Balance Support: Bilateral upper extremity supported  Dynamic Standing-Level of Assistance: Close supervision  Dynamic Standing-Balance: Turning    Activity Tolerance:  Activity Tolerance  Endurance: Endurance does not limit participation in activity  Treatments:  Encouraged to take short duration ambulation bouts hourly during waking hours with the use of 2WW, focusing on gait pattern.   Educated on the importance of avoiding remaining in one position for extended period of time. Encouraged pain and edema control with use of ice, elevation and activity pacing. Patient verbalized understanding.   Reiterated no pillows/towels etc under the knee   Reviewed TKA precautions      Reviewed car transfer, patient and SO verbalized understanding. Recommended pushing seat back as far as possible, don't use door as support, recline seat to provide increased space for hip mobility. Sit first and then swivel into vehicle. Plans to d/c into a small SUV.     Therapeutic Activity  Therapeutic Activity Performed: Yes (Independent [facility] gown as akanksha)      Ambulation/Gait Training  Ambulation/Gait Training Performed: Yes  Ambulation/Gait Training 1  Surface 1: Level tile  Device 1: Rolling walker  Assistance 1: Close supervision  Quality of Gait 1:  (verbal cues for L heel strike with knee extension in stance, L knee flexion during swing)  Comments/Distance (ft) 1: 185' x 2  Transfers  Transfer: Yes  Transfer 1  Transfer From 1: Sit to, Stand to  Transfer to 1: Sit, Stand  Technique 1: Sit to stand, Stand to sit  Transfer Device 1: Walker  Transfer Level of Assistance 1: Close supervision    Outcome Measures:  [facility] Basic Mobility  Turning from your back to your side while in a flat bed without using bedrails: None  Moving from lying on your back to sitting on the side of a flat bed without using bedrails: None  Moving to and from bed to chair (including a wheelchair): A little  Standing up from a chair using your arms (e.g. wheelchair or bedside chair): A little  To walk in hospital room: A little  Climbing 3-5 steps with railing: A little  Basic Mobility - Total Score: 20    Education Documentation  Precautions, taught by Roseline Cunningham PT at 12/31/2024 12:30 PM.  Learner: Significant Other, Patient  Readiness: Acceptance  Method: Explanation  Response: Verbalizes Understanding    Body Mechanics, taught by Roseline Cunningham PT at 12/31/2024 12:30 PM.  Learner: Significant Other, Patient  Readiness: Acceptance  Method: Explanation  Response: Verbalizes Understanding    Home Exercise Program, taught by Roseline Cunningham PT at 12/31/2024 12:30 PM.  Learner: Significant Other,  Patient  Readiness: Acceptance  Method: Explanation  Response: Verbalizes Understanding    Mobility Training, taught by Roseline Cunningham PT at 12/31/2024 12:30 PM.  Learner: Significant Other, Patient  Readiness: Acceptance  Method: Explanation  Response: Verbalizes Understanding    Precautions, taught by Roseline Cunningham PT at 12/31/2024 10:38 AM.  Learner: Family, Patient  Readiness: Acceptance  Method: Explanation, Demonstration  Response: Verbalizes Understanding, Demonstrated Understanding    Body Mechanics, taught by Roseline Cunningham PT at 12/31/2024 10:38 AM.  Learner: Family, Patient  Readiness: Acceptance  Method: Explanation, Demonstration  Response: Verbalizes Understanding, Demonstrated Understanding    Home Exercise Program, taught by Roseline Cunningham PT at 12/31/2024 10:38 AM.  Learner: Family, Patient  Readiness: Acceptance  Method: Explanation, Demonstration  Response: Verbalizes Understanding, Demonstrated Understanding    Mobility Training, taught by Roseline Cunningham PT at 12/31/2024 10:38 AM.  Learner: Family, Patient  Readiness: Acceptance  Method: Explanation, Demonstration  Response: Verbalizes Understanding, Demonstrated Understanding    Education Comments  No comments found.        OP EDUCATION:       Encounter Problems       Encounter Problems (Active)       Mobility       Patient will demonstrate good understanding of bed mobility, transfers, ambulation, stair negotiation and HEP for safe home going.   (Progressing)       Start:  12/31/24    Expected End:  01/07/25

## 2024-12-31 NOTE — ANESTHESIA POSTPROCEDURE EVALUATION
"Patient: Bj Prado \"Gian\"    Procedure Summary       Date: 12/30/24 Room / Location: [facility] OR 02 / [facility]    Anesthesia Start: 1602 Anesthesia Stop: 1934    Procedure: Incision and Drainage Knee (Left: Knee) Diagnosis:       Infection of total left knee replacement, initial encounter ([facility])      (Left TKA infection)    Surgeons: Rylan Shelby DO Responsible Provider: Girish Rai MD    Anesthesia Type: general, regional ASA Status: 3            Anesthesia Type: general, regional    Vitals Value Taken Time   /66 12/30/24 2015   Temp 37.9 °C (100.2 °F) 12/30/24 1935   Pulse 65 12/30/24 2015   Resp 20 12/30/24 2015   SpO2 96 % 12/30/24 2025       Anesthesia Post Evaluation    Patient location during evaluation: PACU  Patient participation: complete - patient participated  Level of consciousness: awake  Pain management: adequate  Multimodal analgesia pain management approach  Airway patency: patent  Two or more strategies used to mitigate risk of obstructive sleep apnea  Cardiovascular status: acceptable  Respiratory status: acceptable  Hydration status: acceptable  Postoperative Nausea and Vomiting: none        No notable events documented.    "

## 2024-12-31 NOTE — PROGRESS NOTES
Physical Therapy    Physical Therapy Evaluation & Treatment    Patient Name: Gian Prado  MRN: [id number]  Department: [address]  Room: [address] [place] [address]  Today's Date: 12/31/2024   Time Calculation  Start Time: 0851  Stop Time: 0934  Time Calculation (min): 43 min    Assessment/Plan   PT Assessment  PT Assessment Results: Decreased strength, Decreased endurance, Impaired balance, Decreased mobility, Decreased range of motion  Rehab Prognosis: Excellent  Barriers to Discharge Home: No anticipated barriers  Evaluation/Treatment Tolerance: Patient tolerated treatment well  Medical Staff Made Aware: Yes  Strengths: Ability to acquire knowledge, Housing layout, Insight into problems, Support of Caregivers  End of Session Communication: Bedside nurse  Assessment Comment: Patient very pleasant and cooperative. Motivated to improve, strong family support, owns all necessary equipment. Patient would benefit from LOW intensity PT intervention.  End of Session Patient Position: Up in chair, Alarm off, not on at start of session (no alarm necessary per staff, all needs within reach, dtr at bedside)      PT Plan  Treatment/Interventions: Bed mobility, Transfer training, Gait training, Stair training, Balance training, Strengthening, Endurance training, Therapeutic exercise  PT Plan: Ongoing PT  PT Frequency: 4 times per week  PT Discharge Recommendations: Low intensity level of continued care  Equipment Recommended upon Discharge: Wheeled walker (owns)  PT Recommended Transfer Status: Assist x1  PT - OK to Discharge: Yes (per PT POC)      Subjective     General Visit Information:  General  Reason for Referral: Impaired functional mobility; L knee I&D  Referred By: Jules Jones  Past Medical History Relevant to Rehab: GERD, HTN, L knee OA with TKA 8/2024  Family/Caregiver Present: Yes (dtr)  Prior to Session Communication: Bedside nurse  Patient Position Received: Bed, 3 rail up, Alarm on  General Comment: Patient pleasant, cooperative  and agreeable to PT eval  Home Living:  Home Living  Type of Home: House  Lives With: Significant other  Home Adaptive Equipment: Walker rolling or standard, Cane  Home Layout: One level  Home Access: Stairs to enter without rails  Entrance Stairs-Rails: None  Entrance Stairs-Number of Steps: 2  Prior Level of Function:  Prior Function Per Pt/Caregiver Report  Level of [place]: Independent with ADLs and functional transfers, Independent with homemaking with ambulation  Receives Help From: Family  ADL Assistance: Independent  Homemaking Assistance: Independent  Ambulatory Assistance: Independent  Prior Function Comments: Used cane since the wknd d/t L knee pain and edema  Precautions:  Precautions  LE Weight Bearing Status: Weight Bearing as Tolerated  Medical Precautions: Fall precautions (wound vac L knee)  Precautions Comment: 12/30: I&D L knee, abx beads placed, poly exchange, wound vac placed    Vital Signs (Past 2hrs)        Date/Time Vitals Session Patient Position Pulse Resp SpO2 BP MAP (mmHg)    12/31/24 1000 --  --  --  --  97 %  143/72  --                        Objective   Pain:  Pain Assessment  Pain Assessment: 0-10  0-10 (Numeric) Pain Score: 0 - No pain  Cognition:  Cognition  Overall Cognitive Status: Within Functional Limits  Orientation Level: Oriented X4    General Assessments:  General Observation  General Observation: L LE wrapped with ace               Activity Tolerance  Endurance: Endurance does not limit participation in activity    Sensation  Light Touch: No apparent deficits    Strength  Strength Comments: L hip 4+/5, L knee 4-/5, L ankle 4+/5; R LE 4+/5    Coordination  Movements are Fluid and Coordinated: Yes    Postural Control  Postural Control: Within Functional Limits    Static Sitting Balance  Static Sitting-Balance Support: No upper extremity supported, Feet supported  Static Sitting-Level of Assistance: Independent  Dynamic Sitting Balance  Dynamic Sitting-Balance Support: No  upper extremity supported, Feet supported  Dynamic Sitting-Level of Assistance: Independent  Dynamic Sitting-Balance: Forward lean    Static Standing Balance  Static Standing-Balance Support: Bilateral upper extremity supported  Static Standing-Level of Assistance: Close supervision  Dynamic Standing Balance  Dynamic Standing-Balance Support: Bilateral upper extremity supported  Dynamic Standing-Level of Assistance: Close supervision  Dynamic Standing-Balance: Turning  Functional Assessments:  ADL  ADL's Addressed: Yes (min A LE dressing to thread wound vac through underwear. Independent to complete in supine)    Bed Mobility  Bed Mobility: Yes  Bed Mobility 1  Bed Mobility 1: Supine to sitting  Level of Assistance 1: Independent    Transfers  Transfer: Yes  Transfer 1  Transfer From 1: Sit to, Stand to  Transfer to 1: Sit, Stand  Technique 1: Sit to stand, Stand to sit  Transfer Device 1: Walker  Transfer Level of Assistance 1: Close supervision    Ambulation/Gait Training  Ambulation/Gait Training Performed: Yes  Ambulation/Gait Training 1  Surface 1: Level tile  Device 1: Rolling walker  Assistance 1: Close supervision  Quality of Gait 1:  (verbal cues for L heel strike with knee extension in stance, L knee flexion during swing)  Comments/Distance (ft) 1: 40'    Treatments:   Patient encouraged to complete supine ther ex 3x/day. Reviewed 15  reps each: ankle pumps, quad sets, SLR, hip abd/add and heel slides. Patient advised home care will advance ther ex as tolerated   Encouraged to take short duration ambulation bouts hourly during waking hours with the use of 2WW, focusing on gait pattern.   Educated on the importance of avoiding remaining in one position for extended period of time. Encouraged pain and edema control with use of ice, elevation and activity pacing. Patient verbalized understanding.   Reiterated no pillows/towels etc under the knee   Reviewed TKA precautions       Bed Mobility  Bed Mobility:  Yes  Bed Mobility 1  Bed Mobility 1: Supine to sitting  Level of Assistance 1: Independent    Ambulation/Gait Training  Ambulation/Gait Training Performed: Yes  Ambulation/Gait Training 1  Surface 1: Level tile  Device 1: Rolling walker  Assistance 1: Close supervision  Quality of Gait 1:  (verbal cues for L heel strike with knee extension in stance, L knee flexion during swing)  Comments/Distance (ft) 1: 40'  Transfers  Transfer: Yes  Transfer 1  Transfer From 1: Sit to, Stand to  Transfer to 1: Sit, Stand  Technique 1: Sit to stand, Stand to sit  Transfer Device 1: Walker  Transfer Level of Assistance 1: Close supervision       Outcome Measures:  [facility] Basic Mobility  Turning from your back to your side while in a flat bed without using bedrails: None  Moving from lying on your back to sitting on the side of a flat bed without using bedrails: None  Moving to and from bed to chair (including a wheelchair): A little  Standing up from a chair using your arms (e.g. wheelchair or bedside chair): A little  To walk in hospital room: A little  Climbing 3-5 steps with railing: A little  Basic Mobility - Total Score: 20    Encounter Problems       Encounter Problems (Active)       Pain - Adult             Encounter Problems (Resolved)       Mobility       Patient will demonstrate good understanding of bed mobility, transfers, ambulation, stair negotiation and HEP for safe home going.   (Met)       Start:  12/31/24    Expected End:  01/14/25    Resolved:  12/31/24                Education Documentation  Precautions, taught by Roseline Cunningham PT at 12/31/2024 10:38 AM.  Learner: Family, Patient  Readiness: Acceptance  Method: Explanation, Demonstration  Response: Verbalizes Understanding, Demonstrated Understanding    Body Mechanics, taught by Roseline Cunningham PT at 12/31/2024 10:38 AM.  Learner: Family, Patient  Readiness: Acceptance  Method: Explanation, Demonstration  Response: Verbalizes Understanding, Demonstrated  Understanding    Home Exercise Program, taught by Roseline Cunningham, PT at 12/31/2024 10:38 AM.  Learner: Family, Patient  Readiness: Acceptance  Method: Explanation, Demonstration  Response: Verbalizes Understanding, Demonstrated Understanding    Mobility Training, taught by Roseline Cunningham, PT at 12/31/2024 10:38 AM.  Learner: Family, Patient  Readiness: Acceptance  Method: Explanation, Demonstration  Response: Verbalizes Understanding, Demonstrated Understanding    Education Comments  No comments found.

## 2024-12-31 NOTE — PROGRESS NOTES
Patient: Bj Prado  Room/bed: 149/149-A  Admitted on: 12/28/2024    Age: 64 y.o.   Gender: male  Code Status:  Full Code   Admitting Dx: Septic arthritis (Multi) [M00.9]  Knee effusion, left [M25.462]    MRN: [id number]  PCP: Keith Doe,        Subjective   The patient is alert and oriented x 3.  Hemodynamically stable.  Inquiring about discharge home.    Objective    Physical Exam  Constitutional:       Appearance: Normal appearance.   HENT:      Head: Normocephalic.      Nose: Nose normal.      Mouth/Throat:      Mouth: Mucous membranes are moist.   Eyes:      Extraocular Movements: Extraocular movements intact.      Pupils: Pupils are equal, round, and reactive to light.   Neck:      Comments: No jvd  Cardiovascular:      Pulses: Normal pulses.      Comments: Regular, S4  Pulmonary:      Effort: Pulmonary effort is normal.      Breath sounds: Normal breath sounds.   Abdominal:      General: Bowel sounds are normal.      Palpations: Abdomen is soft.   Musculoskeletal:      Comments:   Left knee surgical dressing intact   Skin:     General: Skin is warm and dry.   Neurological:      General: No focal deficit present.      Mental Status: He is alert.   Psychiatric:         Mood and Affect: Mood normal.         Behavior: Behavior normal.        Temp:  [36.3 °C (97.3 °F)-37.9 °C (100.2 °F)] 36.7 °C (98.1 °F)  Heart Rate:  [57-71] 62  Resp:  [12-20] 15  BP: (110-149)/(56-83) 149/83    Vitals:    12/28/24 1957   Weight: 93.4 kg (206 lb)           I/Os    Intake/Output Summary (Last 24 hours) at 12/31/2024 1609  Last data filed at 12/31/2024 1400  Gross per 24 hour   Intake 1700 ml   Output 675 ml   Net 1025 ml       Labs:   Results from last 72 hours   Lab Units 12/31/24  0537 12/29/24  0735 12/28/24  2115   SODIUM mmol/L 136 136 140   POTASSIUM mmol/L 3.9 3.6 4.3   CHLORIDE mmol/L 102 104 103   CO2 mmol/L 23 25 24   BUN mg/dL 17 17 21   CREATININE mg/dL 0.95 0.99 0.95   GLUCOSE mg/dL 127* 129* 121*   CALCIUM  "mg/dL 8.2* 7.8* 9.0   ANION GAP mmol/L 15 11 17   EGFR mL/min/1.73m*2 89 85 89      Results from last 72 hours   Lab Units 12/31/24  0537 12/29/24  0735 12/28/24  2115   WBC AUTO x10*3/uL 9.8 14.9* 17.7*   HEMOGLOBIN g/dL 11.8* 12.3* 14.0   HEMATOCRIT % 36.1* 37.5* 42.1   PLATELETS AUTO x10*3/uL 236 204 244   NEUTROS PCT AUTO % 81.2  --  87.0   LYMPHS PCT AUTO % 7.9  --  5.3   MONOS PCT AUTO % 10.2  --  6.8   EOS PCT AUTO % 0.0  --  0.1      Lab Results   Component Value Date    CALCIUM 8.2 (L) 12/31/2024      Lab Results   Component Value Date    CRP 2.12 (H) 12/28/2024      [unfilled]     Micro/ID:   Susceptibility data from last 90 days.  Collected Specimen Info Organism   12/28/24 Fluid from Synovial Staphylococcus lugdunensis                    No lab exists for component: \"AGALPCRNB\"   .ID  Lab Results   Component Value Date    BLOODCULT No growth at 2 days 12/29/2024    BLOODCULT No growth at 2 days 12/29/2024       Images:  Bedside PICC Imaging  These images are not reportable by radiology and will not be interpreted   by  Radiologists.       Meds    Scheduled medications  amLODIPine, 5 mg, oral, Daily  aspirin, 325 mg, oral, BID  cefTRIAXone, 2 g, intravenous, q24h  ketorolac, 15 mg, intravenous, q6h  lidocaine, 5 mL, infiltration, Once  lidocaine, 5 mL, infiltration, Once  losartan, 100 mg, oral, Daily  pantoprazole, 40 mg, oral, Daily before breakfast  polyethylene glycol, 17 g, oral, Daily  vancomycin, 1,000 mg, intravenous, q12h      Continuous medications     PRN medications  PRN medications: acetaminophen **OR** acetaminophen **OR** acetaminophen, alteplase, morphine, naloxone, ondansetron **OR** ondansetron, oxyCODONE, oxyCODONE, sodium chloride 0.9%, sodium chloride 0.9%, traMADol, vancomycin     Assessment and Plan        Gian Prado is a 64 y.o. male with pertinent medical history of recent left total knee arthroplasty completed in August 2024 by Dr. Pelletier who presented to [facility] " with complaints of worsening severe radiating left knee pain that began today and has had knee swelling since September.     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  Anticipate discharging home with [facility].  -Ortho and ID following    Hypertension  -Blood pressure fluctuating will continue losartan 100 mg daily and increase the amlodipine to 10 mg daily.  -Monitor BP per protocol    DVT prophylaxis  -Aspirin 325 mg p.o. twice daily    Dispo: Anticipate discharge home with [facility], when everything is set up. Pending OR cultures.       SHEREEN Gunderson-CNP

## 2025-01-01 LAB
ANION GAP SERPL CALC-SCNC: 12 MMOL/L (ref 10–20)
BACTERIA FLD CULT: ABNORMAL
BUN SERPL-MCNC: 23 MG/DL (ref 6–23)
CALCIUM SERPL-MCNC: 8.3 MG/DL (ref 8.6–10.3)
CHLORIDE SERPL-SCNC: 105 MMOL/L (ref 98–107)
CO2 SERPL-SCNC: 26 MMOL/L (ref 21–32)
CREAT SERPL-MCNC: 1 MG/DL (ref 0.5–1.3)
EGFRCR SERPLBLD CKD-EPI 2021: 84 ML/MIN/1.73M*2
GLUCOSE SERPL-MCNC: 93 MG/DL (ref 74–99)
GRAM STN SPEC: ABNORMAL
GRAM STN SPEC: ABNORMAL
POTASSIUM SERPL-SCNC: 3.8 MMOL/L (ref 3.5–5.3)
SODIUM SERPL-SCNC: 139 MMOL/L (ref 136–145)
VANCOMYCIN SERPL-MCNC: 16.3 UG/ML (ref 5–20)

## 2025-01-01 PROCEDURE — 80202 ASSAY OF VANCOMYCIN: CPT | Performed by: FAMILY MEDICINE

## 2025-01-01 PROCEDURE — 2500000004 HC RX 250 GENERAL PHARMACY W/ HCPCS (ALT 636 FOR OP/ED): Performed by: ORTHOPAEDIC SURGERY

## 2025-01-01 PROCEDURE — 2500000001 HC RX 250 WO HCPCS SELF ADMINISTERED DRUGS (ALT 637 FOR MEDICARE OP): Performed by: ORTHOPAEDIC SURGERY

## 2025-01-01 PROCEDURE — 99233 SBSQ HOSP IP/OBS HIGH 50: CPT | Performed by: NURSE PRACTITIONER

## 2025-01-01 PROCEDURE — 80048 BASIC METABOLIC PNL TOTAL CA: CPT | Performed by: NURSE PRACTITIONER

## 2025-01-01 PROCEDURE — 2500000004 HC RX 250 GENERAL PHARMACY W/ HCPCS (ALT 636 FOR OP/ED): Performed by: FAMILY MEDICINE

## 2025-01-01 PROCEDURE — 1100000001 HC PRIVATE ROOM DAILY

## 2025-01-01 RX ORDER — AMLODIPINE BESYLATE 10 MG/1
10 TABLET ORAL DAILY
Status: DISCONTINUED | OUTPATIENT
Start: 2025-01-02 | End: 2025-01-03 | Stop reason: HOSPADM

## 2025-01-01 RX ADMIN — OXYCODONE HYDROCHLORIDE 5 MG: 5 TABLET ORAL at 18:24

## 2025-01-01 RX ADMIN — ASPIRIN 325 MG: 325 TABLET, COATED ORAL at 08:30

## 2025-01-01 RX ADMIN — OXYCODONE HYDROCHLORIDE 5 MG: 5 TABLET ORAL at 00:23

## 2025-01-01 RX ADMIN — KETOROLAC TROMETHAMINE 15 MG: 30 INJECTION, SOLUTION INTRAMUSCULAR at 03:43

## 2025-01-01 RX ADMIN — AMLODIPINE BESYLATE 5 MG: 5 TABLET ORAL at 08:30

## 2025-01-01 RX ADMIN — PANTOPRAZOLE SODIUM 40 MG: 40 TABLET, DELAYED RELEASE ORAL at 06:12

## 2025-01-01 RX ADMIN — VANCOMYCIN HYDROCHLORIDE 1000 MG: 1 INJECTION, SOLUTION INTRAVENOUS at 00:23

## 2025-01-01 RX ADMIN — CEFTRIAXONE SODIUM 2 G: 2 INJECTION, SOLUTION INTRAVENOUS at 05:45

## 2025-01-01 RX ADMIN — ASPIRIN 325 MG: 325 TABLET, COATED ORAL at 20:11

## 2025-01-01 RX ADMIN — VANCOMYCIN HYDROCHLORIDE 1250 MG: 1.25 INJECTION, POWDER, LYOPHILIZED, FOR SOLUTION INTRAVENOUS at 11:55

## 2025-01-01 RX ADMIN — LOSARTAN POTASSIUM 100 MG: 50 TABLET, FILM COATED ORAL at 08:30

## 2025-01-01 RX ADMIN — OXYCODONE HYDROCHLORIDE 5 MG: 5 TABLET ORAL at 12:05

## 2025-01-01 ASSESSMENT — COGNITIVE AND FUNCTIONAL STATUS - GENERAL
STANDING UP FROM CHAIR USING ARMS: A LITTLE
HELP NEEDED FOR BATHING: A LITTLE
CLIMB 3 TO 5 STEPS WITH RAILING: A LITTLE
DRESSING REGULAR LOWER BODY CLOTHING: A LITTLE
TOILETING: A LITTLE
DAILY ACTIVITIY SCORE: 21
WALKING IN HOSPITAL ROOM: A LITTLE
WALKING IN HOSPITAL ROOM: A LITTLE
MOBILITY SCORE: 21
HELP NEEDED FOR BATHING: A LITTLE
CLIMB 3 TO 5 STEPS WITH RAILING: A LITTLE
MOBILITY SCORE: 21
DAILY ACTIVITIY SCORE: 23
STANDING UP FROM CHAIR USING ARMS: A LITTLE

## 2025-01-01 ASSESSMENT — PAIN SCALES - GENERAL
PAINLEVEL_OUTOF10: 0 - NO PAIN
PAINLEVEL_OUTOF10: 5 - MODERATE PAIN
PAINLEVEL_OUTOF10: 1
PAINLEVEL_OUTOF10: 5 - MODERATE PAIN
PAINLEVEL_OUTOF10: 0 - NO PAIN
PAINLEVEL_OUTOF10: 4
PAINLEVEL_OUTOF10: 0 - NO PAIN

## 2025-01-01 ASSESSMENT — PAIN - FUNCTIONAL ASSESSMENT
PAIN_FUNCTIONAL_ASSESSMENT: 0-10

## 2025-01-01 NOTE — CARE PLAN
The patient's goals for the shift include  rest    Problem: Pain - Adult  Goal: Verbalizes/displays adequate comfort level or baseline comfort level  Outcome: Progressing     Problem: Safety - Adult  Goal: Free from fall injury  Outcome: Progressing     Problem: Discharge Planning  Goal: Discharge to home or other facility with appropriate resources  Outcome: Progressing     Problem: Chronic Conditions and Co-morbidities  Goal: Patient's chronic conditions and co-morbidity symptoms are monitored and maintained or improved  Outcome: Progressing     The clinical goals for the shift include patient will have pain managed to allow for at least 5 hours of sleep this shift.

## 2025-01-01 NOTE — PROGRESS NOTES
Patient: Bj Prado  Room/bed: 149/149-A  Admitted on: 12/28/2024    Age: 64 y.o.   Gender: male  Code Status:  Full Code   Admitting Dx: Septic arthritis (Multi) [M00.9]  Knee effusion, left [M25.462]    MRN: [id number]  PCP: Keith Doe,        Subjective   The patient is alert and oriented x 3.  Hemodynamically stable.      Objective    Physical Exam  Constitutional:       Appearance: Normal appearance.   HENT:      Head: Normocephalic.      Nose: Nose normal.      Mouth/Throat:      Mouth: Mucous membranes are moist.   Eyes:      Extraocular Movements: Extraocular movements intact.      Pupils: Pupils are equal, round, and reactive to light.   Neck:      Comments: No jvd  Cardiovascular:      Pulses: Normal pulses.      Comments: Regular, S4  Pulmonary:      Effort: Pulmonary effort is normal.      Breath sounds: Normal breath sounds.   Abdominal:      General: Bowel sounds are normal.      Palpations: Abdomen is soft.   Musculoskeletal:      Comments:   Left knee surgical dressing intact   Skin:     General: Skin is warm and dry.   Neurological:      General: No focal deficit present.      Mental Status: He is alert.   Psychiatric:         Mood and Affect: Mood normal.         Behavior: Behavior normal.        Temp:  [36.1 °C (97 °F)-36.7 °C (98.1 °F)] 36.7 °C (98.1 °F)  Heart Rate:  [58-67] 67  Resp:  [16-17] 17  BP: (127-188)/(67-92) 133/67    Vitals:    12/28/24 1957   Weight: 93.4 kg (206 lb)           I/Os    Intake/Output Summary (Last 24 hours) at 1/1/2025 1547  Last data filed at 1/1/2025 1351  Gross per 24 hour   Intake 1140 ml   Output 200 ml   Net 940 ml       Labs:   Results from last 72 hours   Lab Units 12/31/24  0537   SODIUM mmol/L 136   POTASSIUM mmol/L 3.9   CHLORIDE mmol/L 102   CO2 mmol/L 23   BUN mg/dL 17   CREATININE mg/dL 0.95   GLUCOSE mg/dL 127*   CALCIUM mg/dL 8.2*   ANION GAP mmol/L 15   EGFR mL/min/1.73m*2 89      Results from last 72 hours   Lab Units 12/31/24  0537   WBC  "AUTO x10*3/uL 9.8   HEMOGLOBIN g/dL 11.8*   HEMATOCRIT % 36.1*   PLATELETS AUTO x10*3/uL 236   NEUTROS PCT AUTO % 81.2   LYMPHS PCT AUTO % 7.9   MONOS PCT AUTO % 10.2   EOS PCT AUTO % 0.0      Lab Results   Component Value Date    CALCIUM 8.2 (L) 12/31/2024      Lab Results   Component Value Date    CRP 2.12 (H) 12/28/2024      [unfilled]     Micro/ID:   Susceptibility data from last 90 days.  Collected Specimen Info Organism Clindamycin Erythromycin Oxacillin Tetracycline Trimethoprim/Sulfamethoxazole Vancomycin   12/28/24 Fluid from Synovial Staphylococcus lugdunensis  S  S  S  S  S  S                    No lab exists for component: \"AGALPCRNB\"   .ID  Lab Results   Component Value Date    BLOODCULT No growth at 3 days 12/29/2024    BLOODCULT No growth at 3 days 12/29/2024       Images:  Bedside PICC Imaging  These images are not reportable by radiology and will not be interpreted   by  Radiologists.       Meds    Scheduled medications  [START ON 1/2/2025] amLODIPine, 10 mg, oral, Daily  aspirin, 325 mg, oral, BID  cefTRIAXone, 2 g, intravenous, q24h  lidocaine, 5 mL, infiltration, Once  lidocaine, 5 mL, infiltration, Once  losartan, 100 mg, oral, Daily  pantoprazole, 40 mg, oral, Daily before breakfast  polyethylene glycol, 17 g, oral, Daily      Continuous medications     PRN medications  PRN medications: acetaminophen **OR** acetaminophen **OR** acetaminophen, alteplase, morphine, naloxone, ondansetron **OR** ondansetron, oxyCODONE, oxyCODONE, sodium chloride 0.9%, sodium chloride 0.9%, traMADol     Assessment and Plan        Gian Prado is a 64 y.o. male with pertinent medical history of recent left total knee arthroplasty completed in August 2024 by Dr. Pelletier who presented to [facility] with complaints of worsening severe radiating left knee pain that began today and has had knee swelling since September.     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  Anticipate discharging home with [facility].  -Ortho and ID following    Hypertension  -Blood pressure fluctuating will continue losartan 100 mg daily and increase the amlodipine to 10 mg daily.  -Monitor BP per protocol    DVT prophylaxis  -Aspirin 325 mg p.o. twice daily    Dispo: Anticipate discharge home with [facility], when everything is set up. Pending OR cultures.       SHEREEN Gunderson-CNP

## 2025-01-01 NOTE — PROGRESS NOTES
Gian Prado is a 64 y.o. male on day 3 of admission presenting with No Principal Problem: There is no principal problem currently on the Problem List. Please update the Problem List and refresh..    Subjective   Interval History: low grade temp, no new complaints      Review of Systems    Objective   Range of Vitals (last 24 hours)  Heart Rate:  [58-65]   Temp:  [36.1 °C (97 °F)-36.7 °C (98.1 °F)]   Resp:  [16]   BP: (127-188)/(73-92)   SpO2:  [94 %-97 %]   Daily Weight  12/28/24 : 93.4 kg (206 lb)    Body mass index is 29.56 kg/m².    Physical Exam  Constitutional:       Appearance: Normal appearance.   HENT:      Head: Normocephalic and atraumatic.      Mouth/Throat:      Mouth: Mucous membranes are moist.      Pharynx: Oropharynx is clear.   Eyes:      Pupils: Pupils are equal, round, and reactive to light.   Cardiovascular:      Rate and Rhythm: Normal rate and regular rhythm.      Heart sounds: Normal heart sounds.   Pulmonary:      Effort: Pulmonary effort is normal.      Breath sounds: Normal breath sounds.   Abdominal:      General: Abdomen is flat. Bowel sounds are normal.      Palpations: Abdomen is soft.   Musculoskeletal:      Cervical back: Normal range of motion.      Comments: Lt leg dressing   Neurological:      Mental Status: He is alert.         Antibiotics  cefTRIAXone - 2 gram/50 mL  vancomycin - 1250 mg/250 mL    Relevant Results  Labs  Results from last 72 hours   Lab Units 12/31/24  0537   WBC AUTO x10*3/uL 9.8   HEMOGLOBIN g/dL 11.8*   HEMATOCRIT % 36.1*   PLATELETS AUTO x10*3/uL 236   NEUTROS PCT AUTO % 81.2   LYMPHS PCT AUTO % 7.9   MONOS PCT AUTO % 10.2   EOS PCT AUTO % 0.0     Results from last 72 hours   Lab Units 12/31/24  0537   SODIUM mmol/L 136   POTASSIUM mmol/L 3.9   CHLORIDE mmol/L 102   CO2 mmol/L 23   BUN mg/dL 17   CREATININE mg/dL 0.95   GLUCOSE mg/dL 127*   CALCIUM mg/dL 8.2*   ANION GAP mmol/L 15   EGFR mL/min/1.73m*2 89         Estimated Creatinine Clearance: 89 mL/min (by  C-G formula based on SCr of 0.95 mg/dL).  C-Reactive Protein   Date Value Ref Range Status   12/28/2024 2.12 (H) <1.00 mg/dL Final     Microbiology  Susceptibility data from last 14 days.  Collected Specimen Info Organism Clindamycin Erythromycin Oxacillin Tetracycline Trimethoprim/Sulfamethoxazole Vancomycin   12/28/24 Fluid from Synovial Staphylococcus lugdunensis  S  S  S  S  S  S   Imaging  Reviewed        Assessment/Plan     Left knee prosthesis infection, sp I&D / poly exchange, the culture from OR  is pending, preoperative aspirate with Staph lugdunensis      Recommendations :  Continue Rocephin  Discussed with the ortho team     I spent minutes in the professional and overall care of this patient.      Janet Pemberton MD

## 2025-01-01 NOTE — PROGRESS NOTES
"Bj Prado \"Gian\" is a 64 y.o. male on day 3 of admission presenting with Left knee suspected PJI  Subjective   Postop day 2 left knee irrigation and debridement, polyethylene exchange, placement of dissolvable antibiotic beads and application of negative pressure wound therapy.  Patient doing well.  No fevers or chills.  He is getting up and ambulating often.       Objective     Physical Exam: Alert and oriented x 3, dressing is clean, dry and intact, no significant drainage, suction seal is maintained.  He is able to straight leg raise without lag.  Motor and sensory intact distally.  No signs of blood clot    Last Recorded Vitals  Blood pressure (!) 188/92, pulse 60, temperature 36.1 °C (97 °F), temperature source Temporal, resp. rate 16, height 1.778 m (5' 10\"), weight 93.4 kg (206 lb), SpO2 97%.  Intake/Output last 3 Shifts:  I/O last 3 completed shifts:  In: 1350 (14.4 mL/kg) [P.O.:450; I.V.:200 (2.1 mL/kg); IV Piggyback:700]  Out: 825 (8.8 mL/kg) [Urine:825 (0.2 mL/kg/hr)]  Weight: 93.4 kg     Relevant Results      Scheduled medications  amLODIPine, 5 mg, oral, Daily  aspirin, 325 mg, oral, BID  cefTRIAXone, 2 g, intravenous, q24h  lidocaine, 5 mL, infiltration, Once  lidocaine, 5 mL, infiltration, Once  losartan, 100 mg, oral, Daily  pantoprazole, 40 mg, oral, Daily before breakfast  polyethylene glycol, 17 g, oral, Daily  vancomycin, 1,250 mg, intravenous, q12h      Continuous medications     PRN medications  PRN medications: acetaminophen **OR** acetaminophen **OR** acetaminophen, alteplase, morphine, naloxone, ondansetron **OR** ondansetron, oxyCODONE, oxyCODONE, sodium chloride 0.9%, sodium chloride 0.9%, traMADol, vancomycin  Results for orders placed or performed during the hospital encounter of 12/28/24 (from the past 24 hours)   Vancomycin   Result Value Ref Range    Vancomycin 16.3 5.0 - 20.0 ug/mL     Culture from presurgical aspiration is growing staph lugdunensis that is pansensitive.  " Intraoperative cultures pending        This patient has a central line   Reason for the central line remaining today?   Line will be required for extended IV antibiotics               Assessment/Plan   Assessment & Plan  Knee effusion, left    Postoperative day 2 status post left knee irrigation and debridement, polyethylene exchange, placement of dissolvable antibiotic beads and application of negative pressure wound therapy dressing.  Plan: Physical therapy and Occupational Therapy weightbearing as tolerated.  Continue the wound VAC.  Multimodal DVT prophylaxis including aspirin, ambulation and sequential oppressive dressing.  Awaiting intraoperative cultures per ID.  Continue analgesics.  Incentive spirometer.         I spent 15 minutes in the professional and overall care of this patient.      Gildardo Muñoz, DO

## 2025-01-01 NOTE — PROGRESS NOTES
Vancomycin Dosing by Pharmacy- FOLLOW UP    Bj Prado is a 64 y.o. year old male who Pharmacy has been consulted for vancomycin dosing for osteomyelitis/septic arthritis. Based on the patient's indication and renal status this patient is being dosed based on a goal AUC of 400-600.     Renal function is currently stable.    Current vancomycin dose: 1000 mg given every 12 hours    Estimated vancomycin AUC on current dose: 396 mg/L.hr     Visit Vitals  /74 (BP Location: Right arm, Patient Position: Lying)   Pulse 58   Temp 36.5 °C (97.7 °F) (Temporal)   Resp 16        Lab Results   Component Value Date    CREATININE 0.95 2024    CREATININE 0.99 2024    CREATININE 0.95 2024    CREATININE 1.17 2024        Patient weight is as follows:   Vitals:    24   Weight: 93.4 kg (206 lb)       Cultures:  Susceptibility data for the encounter in last 14 days.  Collected Specimen Info Organism   24 Fluid from Synovial Staphylococcus lugdunensis        I/O last 3 completed shifts:  In: 2180 (23.3 mL/kg) [P.O.:730; I.V.:1200 (12.8 mL/kg); IV Piggyback:250]  Out: 675 (7.2 mL/kg) [Urine:625 (0.2 mL/kg/hr); Blood:50]  Weight: 93.4 kg   I/O during current shift:  I/O this shift:  In: 600 [P.O.:200; IV Piggyback:400]  Out: 200 [Urine:200]    Temp (24hrs), Av.6 °C (97.8 °F), Min:36.1 °C (97 °F), Max:36.7 °C (98.1 °F)      Assessment/Plan    Below goal AUC. Orders placed for new vancomcyin regimen of 1250mg every 12 hours to begin at 1200.     This dosing regimen is predicted by [employer] to result in the following pharmacokinetic parameters:    Regimen: 1250 mg IV every 12 hours.  Start time: 12:23 on 2025  Exposure target: AUC24 (range)400-600 mg/L.hr   BIW20-26: 459 mg/L.hr  AUC24,ss: 489 mg/L.hr  Probability of AUC24 > 400: 82 %  Ctrough,ss: 16.6 mg/L  Probability of Ctrough,ss > 20: 33 %    The next level will be obtained on  at 5a. May be obtained sooner if clinically  indicated.   Will continue to monitor renal function daily while on vancomycin and order serum creatinine at least every 48 hours if not already ordered.  Follow for continued vancomycin needs, clinical response, and signs/symptoms of toxicity.       Arjun Ruiz, PharmD

## 2025-01-01 NOTE — PROGRESS NOTES
Vancomycin Dosing by Pharmacy- Cessation of Therapy    Consult to pharmacy for vancomycin dosing has been discontinued by the prescriber, pharmacy will sign off at this time.    Please call pharmacy if there are further questions or re-enter a consult if vancomycin is resumed.     Kristi Arora, PharmD

## 2025-01-02 ENCOUNTER — HOME HEALTH ADMISSION (OUTPATIENT)
Dept: HOME HEALTH SERVICES | Facility: HOME HEALTH | Age: 65
End: 2025-01-02
Payer: MEDICARE

## 2025-01-02 ENCOUNTER — DOCUMENTATION (OUTPATIENT)
Dept: HOME HEALTH SERVICES | Facility: HOME HEALTH | Age: 65
End: 2025-01-02
Payer: MEDICARE

## 2025-01-02 ENCOUNTER — PHARMACY VISIT (OUTPATIENT)
Dept: PHARMACY | Facility: CLINIC | Age: 65
End: 2025-01-02
Payer: COMMERCIAL

## 2025-01-02 ENCOUNTER — HOME INFUSION (OUTPATIENT)
Dept: INFUSION THERAPY | Age: 65
End: 2025-01-02
Payer: MEDICARE

## 2025-01-02 LAB
BACTERIA BLD CULT: NORMAL
BACTERIA BLD CULT: NORMAL
BACTERIA SPEC CULT: NORMAL
ERYTHROCYTE [DISTWIDTH] IN BLOOD BY AUTOMATED COUNT: 12.9 % (ref 11.5–14.5)
FUNGUS SPEC CULT: NORMAL
FUNGUS SPEC FUNGUS STN: NORMAL
GRAM STN SPEC: NORMAL
HCT VFR BLD AUTO: 33.9 % (ref 41–52)
HGB BLD-MCNC: 11.1 G/DL (ref 13.5–17.5)
MCH RBC QN AUTO: 29 PG (ref 26–34)
MCHC RBC AUTO-ENTMCNC: 32.7 G/DL (ref 32–36)
MCV RBC AUTO: 89 FL (ref 80–100)
NRBC BLD-RTO: 0.3 /100 WBCS (ref 0–0)
PLATELET # BLD AUTO: 247 X10*3/UL (ref 150–450)
RBC # BLD AUTO: 3.83 X10*6/UL (ref 4.5–5.9)
WBC # BLD AUTO: 7.5 X10*3/UL (ref 4.4–11.3)

## 2025-01-02 PROCEDURE — 99232 SBSQ HOSP IP/OBS MODERATE 35: CPT | Performed by: NURSE PRACTITIONER

## 2025-01-02 PROCEDURE — 2500000001 HC RX 250 WO HCPCS SELF ADMINISTERED DRUGS (ALT 637 FOR MEDICARE OP): Performed by: NURSE PRACTITIONER

## 2025-01-02 PROCEDURE — 2500000001 HC RX 250 WO HCPCS SELF ADMINISTERED DRUGS (ALT 637 FOR MEDICARE OP): Performed by: ORTHOPAEDIC SURGERY

## 2025-01-02 PROCEDURE — 2500000004 HC RX 250 GENERAL PHARMACY W/ HCPCS (ALT 636 FOR OP/ED): Performed by: ORTHOPAEDIC SURGERY

## 2025-01-02 PROCEDURE — 97110 THERAPEUTIC EXERCISES: CPT | Mod: GP

## 2025-01-02 PROCEDURE — 85027 COMPLETE CBC AUTOMATED: CPT | Performed by: NURSE PRACTITIONER

## 2025-01-02 PROCEDURE — 1100000001 HC PRIVATE ROOM DAILY

## 2025-01-02 PROCEDURE — 2500000004 HC RX 250 GENERAL PHARMACY W/ HCPCS (ALT 636 FOR OP/ED): Performed by: NURSE PRACTITIONER

## 2025-01-02 PROCEDURE — RXMED WILLOW AMBULATORY MEDICATION CHARGE

## 2025-01-02 RX ORDER — DIPHENHYDRAMINE HYDROCHLORIDE 50 MG/ML
25 INJECTION INTRAMUSCULAR; INTRAVENOUS ONCE
Status: COMPLETED | OUTPATIENT
Start: 2025-01-02 | End: 2025-01-02

## 2025-01-02 RX ORDER — SENNOSIDES 8.6 MG/1
1 TABLET ORAL DAILY
Qty: 30 TABLET | Refills: 1 | Status: SHIPPED | OUTPATIENT
Start: 2025-01-02 | End: 2025-03-03

## 2025-01-02 RX ORDER — ASPIRIN 325 MG
325 TABLET, DELAYED RELEASE (ENTERIC COATED) ORAL 2 TIMES DAILY
Qty: 60 TABLET | Refills: 1 | Status: SHIPPED | OUTPATIENT
Start: 2025-01-02 | End: 2025-03-03

## 2025-01-02 RX ORDER — CEFTRIAXONE 2 G/1
2 INJECTION, POWDER, FOR SOLUTION INTRAMUSCULAR; INTRAVENOUS DAILY
Qty: 60 G | Refills: 1 | Status: SHIPPED
Start: 2025-01-02 | End: 2025-02-13

## 2025-01-02 RX ORDER — OXYCODONE HYDROCHLORIDE 10 MG/1
10 TABLET ORAL EVERY 6 HOURS PRN
Qty: 10 TABLET | Refills: 0 | Status: SHIPPED
Start: 2025-01-02 | End: 2025-01-02

## 2025-01-02 RX ORDER — OXYCODONE HYDROCHLORIDE 10 MG/1
10 TABLET ORAL EVERY 6 HOURS PRN
Qty: 10 TABLET | Refills: 0 | Status: SHIPPED | OUTPATIENT
Start: 2025-01-02

## 2025-01-02 RX ORDER — ASPIRIN 325 MG
325 TABLET, DELAYED RELEASE (ENTERIC COATED) ORAL 2 TIMES DAILY
Qty: 60 TABLET | Refills: 1 | Status: SHIPPED
Start: 2025-01-02 | End: 2025-01-02

## 2025-01-02 RX ADMIN — AMLODIPINE BESYLATE 10 MG: 10 TABLET ORAL at 08:46

## 2025-01-02 RX ADMIN — ASPIRIN 325 MG: 325 TABLET, COATED ORAL at 08:46

## 2025-01-02 RX ADMIN — PANTOPRAZOLE SODIUM 40 MG: 40 TABLET, DELAYED RELEASE ORAL at 06:16

## 2025-01-02 RX ADMIN — ASPIRIN 325 MG: 325 TABLET, COATED ORAL at 20:00

## 2025-01-02 RX ADMIN — DIPHENHYDRAMINE HYDROCHLORIDE, ZINC ACETATE: 2; .1 CREAM TOPICAL at 18:37

## 2025-01-02 RX ADMIN — OXYCODONE HYDROCHLORIDE 5 MG: 5 TABLET ORAL at 15:35

## 2025-01-02 RX ADMIN — OXYCODONE HYDROCHLORIDE 5 MG: 5 TABLET ORAL at 00:32

## 2025-01-02 RX ADMIN — DIPHENHYDRAMINE HYDROCHLORIDE 25 MG: 50 INJECTION, SOLUTION INTRAMUSCULAR; INTRAVENOUS at 18:52

## 2025-01-02 RX ADMIN — OXYCODONE HYDROCHLORIDE 5 MG: 5 TABLET ORAL at 22:17

## 2025-01-02 RX ADMIN — LOSARTAN POTASSIUM 100 MG: 50 TABLET, FILM COATED ORAL at 08:46

## 2025-01-02 RX ADMIN — CEFTRIAXONE SODIUM 2 G: 2 INJECTION, SOLUTION INTRAVENOUS at 06:55

## 2025-01-02 RX ADMIN — OXYCODONE HYDROCHLORIDE 5 MG: 5 TABLET ORAL at 06:56

## 2025-01-02 ASSESSMENT — COGNITIVE AND FUNCTIONAL STATUS - GENERAL
MOBILITY SCORE: 21
HELP NEEDED FOR BATHING: A LITTLE
DRESSING REGULAR LOWER BODY CLOTHING: A LITTLE
DAILY ACTIVITIY SCORE: 21
STANDING UP FROM CHAIR USING ARMS: A LITTLE
MOVING FROM LYING ON BACK TO SITTING ON SIDE OF FLAT BED WITH BEDRAILS: A LITTLE
WALKING IN HOSPITAL ROOM: A LITTLE
HELP NEEDED FOR BATHING: A LITTLE
TURNING FROM BACK TO SIDE WHILE IN FLAT BAD: A LITTLE
WALKING IN HOSPITAL ROOM: A LITTLE
MOBILITY SCORE: 18
CLIMB 3 TO 5 STEPS WITH RAILING: A LITTLE
TOILETING: A LITTLE
TOILETING: A LITTLE
CLIMB 3 TO 5 STEPS WITH RAILING: A LITTLE
WALKING IN HOSPITAL ROOM: A LITTLE
DRESSING REGULAR LOWER BODY CLOTHING: A LITTLE
STANDING UP FROM CHAIR USING ARMS: A LITTLE
MOBILITY SCORE: 21
MOVING TO AND FROM BED TO CHAIR: A LITTLE
CLIMB 3 TO 5 STEPS WITH RAILING: A LITTLE
STANDING UP FROM CHAIR USING ARMS: A LITTLE
DAILY ACTIVITIY SCORE: 21

## 2025-01-02 ASSESSMENT — PAIN - FUNCTIONAL ASSESSMENT
PAIN_FUNCTIONAL_ASSESSMENT: 0-10
PAIN_FUNCTIONAL_ASSESSMENT: UNABLE TO SELF-REPORT
PAIN_FUNCTIONAL_ASSESSMENT: 0-10
PAIN_FUNCTIONAL_ASSESSMENT: 0-10

## 2025-01-02 ASSESSMENT — PAIN SCALES - GENERAL
PAINLEVEL_OUTOF10: 5 - MODERATE PAIN
PAINLEVEL_OUTOF10: 0 - NO PAIN
PAINLEVEL_OUTOF10: 6
PAINLEVEL_OUTOF10: 0 - NO PAIN
PAINLEVEL_OUTOF10: 6
PAINLEVEL_OUTOF10: 0 - NO PAIN

## 2025-01-02 NOTE — CARE PLAN
Problem: Pain - Adult  Goal: Verbalizes/displays adequate comfort level or baseline comfort level  Outcome: Progressing     Problem: Safety - Adult  Goal: Free from fall injury  Outcome: Progressing     Problem: Discharge Planning  Goal: Discharge to home or other facility with appropriate resources  Outcome: Progressing     Problem: Chronic Conditions and Co-morbidities  Goal: Patient's chronic conditions and co-morbidity symptoms are monitored and maintained or improved  Outcome: Progressing   The patient's goals for the shift include      The clinical goals for the shift include pain control

## 2025-01-02 NOTE — CARE PLAN
The patient's goals for the shift include      The clinical goals for the shift include prepare patient for discharge      Problem: Pain  Goal: Takes deep breaths with improved pain control throughout the shift  Outcome: Progressing  Goal: Turns in bed with improved pain control throughout the shift  Outcome: Progressing  Goal: Walks with improved pain control throughout the shift  Outcome: Progressing  Goal: Performs ADL's with improved pain control throughout shift  Outcome: Progressing  Goal: Participates in PT with improved pain control throughout the shift  Outcome: Progressing  Goal: Free from opioid side effects throughout the shift  Outcome: Progressing  Goal: Free from acute confusion related to pain meds throughout the shift  Outcome: Progressing     Problem: Pain - Adult  Goal: Verbalizes/displays adequate comfort level or baseline comfort level  Outcome: Progressing     Problem: Safety - Adult  Goal: Free from fall injury  Outcome: Progressing     Problem: Discharge Planning  Goal: Discharge to home or other facility with appropriate resources  Outcome: Progressing     Problem: Chronic Conditions and Co-morbidities  Goal: Patient's chronic conditions and co-morbidity symptoms are monitored and maintained or improved  Outcome: Progressing

## 2025-01-02 NOTE — PROGRESS NOTES
Gian Prado is a 64 y.o. male on day 4 of admission presenting with No Principal Problem: There is no principal problem currently on the Problem List. Please update the Problem List and refresh..    Subjective   Interval History: low grade temp, no new complaints      Review of Systems    Objective   Range of Vitals (last 24 hours)  Heart Rate:  [60-78]   Temp:  [36.7 °C (98.1 °F)]   Resp:  [16-17]   BP: (133-160)/(67-78)   SpO2:  [95 %-96 %]   Daily Weight  12/28/24 : 93.4 kg (206 lb)    Body mass index is 29.56 kg/m².    Physical Exam  Constitutional:       Appearance: Normal appearance.   HENT:      Head: Normocephalic and atraumatic.      Mouth/Throat:      Mouth: Mucous membranes are moist.      Pharynx: Oropharynx is clear.   Eyes:      Pupils: Pupils are equal, round, and reactive to light.   Cardiovascular:      Rate and Rhythm: Normal rate and regular rhythm.      Heart sounds: Normal heart sounds.   Pulmonary:      Effort: Pulmonary effort is normal.      Breath sounds: Normal breath sounds.   Abdominal:      General: Abdomen is flat. Bowel sounds are normal.      Palpations: Abdomen is soft.   Musculoskeletal:      Cervical back: Normal range of motion.      Comments: Lt leg dressing   Neurological:      Mental Status: He is alert.         Antibiotics  cefTRIAXone - 2 gram, 2 gram/50 mL    Relevant Results  Labs  Results from last 72 hours   Lab Units 01/02/25  0625 12/31/24  0537   WBC AUTO x10*3/uL 7.5 9.8   HEMOGLOBIN g/dL 11.1* 11.8*   HEMATOCRIT % 33.9* 36.1*   PLATELETS AUTO x10*3/uL 247 236   NEUTROS PCT AUTO %  --  81.2   LYMPHS PCT AUTO %  --  7.9   MONOS PCT AUTO %  --  10.2   EOS PCT AUTO %  --  0.0     Results from last 72 hours   Lab Units 01/01/25  0536 12/31/24  0537   SODIUM mmol/L 139 136   POTASSIUM mmol/L 3.8 3.9   CHLORIDE mmol/L 105 102   CO2 mmol/L 26 23   BUN mg/dL 23 17   CREATININE mg/dL 1.00 0.95   GLUCOSE mg/dL 93 127*   CALCIUM mg/dL 8.3* 8.2*   ANION GAP mmol/L 12 15   EGFR  mL/min/1.73m*2 84 89         Estimated Creatinine Clearance: 84.6 mL/min (by C-G formula based on SCr of 1 mg/dL).  C-Reactive Protein   Date Value Ref Range Status   12/28/2024 2.12 (H) <1.00 mg/dL Final     Microbiology  Susceptibility data from last 14 days.  Collected Specimen Info Organism Clindamycin Erythromycin Oxacillin Tetracycline Trimethoprim/Sulfamethoxazole Vancomycin   12/28/24 Fluid from Synovial Staphylococcus lugdunensis  S  S  S  S  S  S   Imaging  Reviewed        Assessment/Plan     Left knee prosthesis infection, sp I&D / poly exchange, the culture from OR  is negative, preoperative aspirate with Staph lugdunensis      Recommendations :  Continue Rocephin x 6 weeks with weekly labs  Discussed with the ortho team     I spent minutes in the professional and overall care of this patient.      Janet Pemberton MD

## 2025-01-02 NOTE — PROGRESS NOTES
Panel Management Review      Patient has the following on her problem list:     Hypertension   Last three blood pressure readings:  BP Readings from Last 3 Encounters:   05/31/18 139/88   01/05/18 142/82   03/21/17 138/78     Blood pressure: Passed    HTN Guidelines:  Age 18-59 BP range:  Less than 140/90  Age 60-85 with Diabetes:  Less than 140/90  Age 60-85 without Diabetes:  less than 150/90      Composite cancer screening  Chart review shows that this patient is due/due soon for the following Mammogram  Summary:    Patient is due/failing the following:   MAMMOGRAM    Action needed:   Pt needs to schedule mammogram.     Type of outreach:    Phone, left message for patient to call back.  and Sent [employer] message.    Questions for provider review:    None                                                                                                                                    Sammi Bey CMA       Chart routed to Sammi Bey CMA             01/02/25  HOME INFUSION ASSESSMENT NOTE    Reviewed Patient info as correct.   DX... left knee septic arthritis  Reviewed allergies…   Allergies   Allergen Reactions    Other Itching and Rash     Mikael hose caused severe rash to legs        PMH:  has a past medical history of Acute prostatitis (03/03/2024), Arthritis, Chronic pain disorder, Eye pain (07/24/2023), GERD (gastroesophageal reflux disease), Hyperlipidemia, Hypertension, Infection of left knee, Other abnormal glucose, Personal history of diseases of the skin and subcutaneous tissue (06/07/2017), Primary osteoarthritis of left knee, and Wrist laceration (04/14/2016).    TOB:  reports that he has never smoked. He has never used smokeless tobacco.    Weight….  93.4 KG Height…. 177.8 CM       No medication Interactions.  Reviewed relevant Baseline Labs.  Labs for Home Infusion are: ESR, CRP, CMP, CBC weekly  Patient has PICC 1L Line placed 12/31/24 with 49 CM length, Flush per [facility] Protocol.  Continue med through tentative stop date: 02/08/25 (6 weeks)  MD Following: Dr AYAD Pemberton  Medication placed in: MB+ [place] Bag  Care Plane Done Today.      Bj Prado   is a  64 y.o. male  that is being discharged from the hospital with a diagnosis of left knee infection…Allergies and PMH listed above ...Patient is ordered ceftriaxone 2Gm IV q24h ..Start of care is 01/04 and continues through 02/08/25 ....Orders reviewed and appropriate for patient and indication...Weekly labs ordered and Dr Pembertno is following.    Medication profile reviewed and appropriate.    Confirmed address and delivery time for patient … will deliver to home  Address between 6-9pm on 01/03/25. Explained that med will come as a gravity infusion and will not need Counseled patient on medication and all questions asked.      Processed fill of 7 MB+ ceftriaxone for 01/03 mix and delivery between  6-9pm ...Fill is a 7 day supply and covers 01/04 through 01/10/25.      Follow up 01/09/25 with patient  progress and labs if available... Refill ceftriaxone for OVN delivery.

## 2025-01-02 NOTE — PROGRESS NOTES
01/02/25 0916   Discharge Planning   Living Arrangements Spouse/significant other   Support Systems Spouse/significant other;Children;Family members;Friends/neighbors   Assistance Needed Patient is A&O X3, on room air, independent with ADLs, drives and uses no DME at baseline. Patient has walker and cane at home from knee surgery in August. Patient is not currently active with any [facility] agencies. Patient is S/P I&D poly exchange, culture from OR is pending, preoperative aspirate with Staph lugdunensis. Per ID patient will likely need rocephin 2 gm IV once daily x 6 weeks if his surgery cultures remain negative.   Type of Residence Private residence   Number of Stairs to Enter Residence 2   Number of Stairs Within Residence 0  (bedroom and bathroom is on main floor)   Who is requesting discharge planning? Provider   Home or Post Acute Services In home services   Type of Home Care Services Home PT;Home OT;Home nursing visits   Expected Discharge Disposition Home H   Does the patient need discharge transport arranged? No

## 2025-01-02 NOTE — PROGRESS NOTES
01/02/25 1200   Discharge Planning   Assistance Needed Home going IV infusion was sent to [facility] by CNP. Awaiting price check through patient's insurance and conformation of SOC. Per [facility] they will not have availability for SOC until the weekend (date of SOC is not yet confirmed). Patient was updated that he will remain here at least through today and maybe tomorrow depending on what day the SOC is confirmed for through [facility]. His learners for the IV ATB are his significant other and his daughter who is a nurse. Izzy Villalba (significant other) [phone] and Mirna Armstrong (daughter) [phone].

## 2025-01-02 NOTE — PROGRESS NOTES
"Physical Therapy    Physical Therapy Treatment    Patient Name: Bj Prado \"Kaycee"  MRN: [id number]  Department: [facility] W  Room: [address] [place] [address]  Today's Date: 1/2/2025  Time Calculation  Start Time: 1125  Stop Time: 1138  Time Calculation (min): 13 min         Assessment/Plan   PT Assessment  PT Assessment Results: Decreased strength, Decreased endurance, Impaired balance, Decreased mobility, Decreased range of motion  Rehab Prognosis: Excellent  Barriers to Discharge Home: No anticipated barriers  Evaluation/Treatment Tolerance: Patient tolerated treatment well  Medical Staff Made Aware: Yes  Strengths: Ability to acquire knowledge, Housing layout, Insight into problems, Support of Caregivers  End of Session Communication: Bedside nurse  Assessment Comment: Patient very pleasant and cooperative. Motivated to improve, strong family support, owns all necessary equipment. Patient, SO and staff report patient has been up and walking with 2WW, no concerns. Patient would benefit from LOW intensity PT intervention.  End of Session Patient Position: Bed, 3 rail up, Alarm on     PT Plan  Treatment/Interventions: Bed mobility, Transfer training, Gait training, Stair training, Balance training, Strengthening, Endurance training, Therapeutic exercise, Range of motion  PT Plan: Ongoing PT  PT Frequency: 4 times per week  PT Discharge Recommendations: Low intensity level of continued care  Equipment Recommended upon Discharge: Wheeled walker (own)  PT Recommended Transfer Status: Assist x1  PT - OK to Discharge: Yes (per PT POC)      General Visit Information:   PT  Visit  PT Received On: 01/02/25  Response to Previous Treatment: Patient with no complaints from previous session.  General  Reason for Referral: Impaired functional mobility; L knee I&D  Referred By: Jules Jones  Past Medical History Relevant to Rehab: GERD, HTN, L knee OA with TKA 8/2024  Family/Caregiver Present: Yes (SO)  Prior to Session Communication: Bedside " nurse  Patient Position Received: Bed, 3 rail up, Alarm on  General Comment: Patient pleasant, cooperative and agreeable to review HEP    Subjective   Precautions:  Precautions  Medical Precautions: Fall precautions (wound vac L knee)  Post-Surgical Precautions: Left total knee precautions    Vital Signs (Past 2hrs)                 Objective   Pain:  Pain Assessment  Pain Assessment: 0-10  0-10 (Numeric) Pain Score: 0 - No pain  Cognition:  Cognition  Overall Cognitive Status: Within Functional Limits  Orientation Level: Oriented X4  Coordination:  Movements are Fluid and Coordinated: Yes  Postural Control:  Postural Control  Postural Control: Within Functional Limits    Activity Tolerance:  Activity Tolerance  Endurance: Endurance does not limit participation in activity  Treatments:  Therapeutic Exercise  Therapeutic Exercise Performed: Yes  Therapeutic Exercise Activity 1:   Patient encouraged to complete supine ther ex 3x/day. Reviewed 15  reps each: ankle pumps, quad sets, SLR, hip abd/add, heel slides, SAQ. Patient advised home care will advance ther ex as tolerated   Encouraged to take short duration ambulation bouts hourly during waking hours with the use of 2WW, focusing on gait pattern.   Educated on the importance of avoiding remaining in one position for extended period of time. Encouraged pain and edema control with use of ice, elevation and activity pacing. Patient verbalized understanding.   Reiterated no pillows/towels etc under the knee   Reviewed TKA precautions   Home going packet reviewed and provided to patient. Patient verbalized understanding.       Outcome Measures:  [facility] Basic Mobility  Turning from your back to your side while in a flat bed without using bedrails: A little  Moving from lying on your back to sitting on the side of a flat bed without using bedrails: A little  Moving to and from bed to chair (including a wheelchair): A little  Standing up from a chair using your arms (e.g.  wheelchair or bedside chair): A little  To walk in hospital room: A little  Climbing 3-5 steps with railing: A little  Basic Mobility - Total Score: 18    Education Documentation  Precautions, taught by Roseline Cunningham PT at 1/2/2025 12:04 PM.  Learner: Patient  Readiness: Acceptance  Method: Explanation, Handout, Demonstration  Response: Verbalizes Understanding, Demonstrated Understanding    Body Mechanics, taught by Roseline Cunningham PT at 1/2/2025 12:04 PM.  Learner: Patient  Readiness: Acceptance  Method: Explanation, Handout, Demonstration  Response: Verbalizes Understanding, Demonstrated Understanding    Home Exercise Program, taught by Roseline Cunninghma PT at 1/2/2025 12:04 PM.  Learner: Patient  Readiness: Acceptance  Method: Explanation, Handout, Demonstration  Response: Verbalizes Understanding, Demonstrated Understanding    Mobility Training, taught by Roseline Cunningham PT at 1/2/2025 12:04 PM.  Learner: Patient  Readiness: Acceptance  Method: Explanation, Handout, Demonstration  Response: Verbalizes Understanding, Demonstrated Understanding    Education Comments  No comments found.        OP EDUCATION:       Encounter Problems       Encounter Problems (Active)       Mobility       Patient will demonstrate good understanding of bed mobility, transfers, ambulation, stair negotiation and HEP for safe home going.   (Progressing)       Start:  12/31/24    Expected End:  01/07/25               Pain - Adult             Encounter Problems (Resolved)       Mobility       Patient will demonstrate good understanding of bed mobility, transfers, ambulation, stair negotiation and HEP for safe home going.   (Met)       Start:  12/31/24    Expected End:  01/14/25    Resolved:  12/31/24            Mobility       Patient will demonstrate good understanding of bed mobility, transfers, ambulation, stair negotiation and HEP for safe home going.   (Met)       Start:  12/31/24    Expected End:  01/07/25    Resolved:  12/31/24

## 2025-01-02 NOTE — HH CARE COORDINATION
Home Care received a Referral for Infusion, Nursing, Physical Therapy, and Occupational Therapy. We have processed the referral for a Start of Care on 01-04-24.     If you have any questions or concerns, please feel free to contact us at [phone]. Follow the prompts, enter your five digit zip code, and you will be directed to your care team on [facility].      Problem: Falls - Risk of  Goal: *Absence of Falls  Description: Document Rena Boyd Fall Risk and appropriate interventions in the flowsheet. Outcome: Progressing Towards Goal  Note: Fall Risk Interventions:     Medication Interventions: Teach patient to arise slowly    Problem: Psychosis  Goal: *STG: Decreased hallucinations  Outcome: Progressing Towards Goal     Problem: Psychosis  Goal: *STG: Remains safe in hospital  Outcome: Progressing Towards Goal     Problem: Psychosis  Goal: *STG/LTG: Complies with medication therapy  Outcome: Progressing Towards Goal     Patient has not fallen so far this shift. Patient denied any hallucinations. Patient has been safe so far this shift. Patient was medication compliant. Patient remains on close observation. Patient has been alert, oriented, cooperative and pleasant. Patient has been mostly isolative to her room and bed. Patient complained of a headache and received Tylenol. She rated her pain as 5/10. She denied depression, anxiety, SI or HI. Medication compliant. Continue to assess. Since going to bed patient has been laying down quietly with her eyes closed.

## 2025-01-02 NOTE — PROGRESS NOTES
Patient: Bj Prado  Room/bed: 149/149-A  Admitted on: 12/28/2024    Age: 64 y.o.   Gender: male  Code Status:  Full Code   Admitting Dx: Septic arthritis (Multi) [M00.9]  Knee effusion, left [M25.462]    MRN: [id number]  PCP: Keith Doe,        Subjective   The patient is alert and oriented x 3.  Hemodynamically stable.  He is planning for discharge home.     Objective    Physical Exam  Constitutional:       Appearance: Normal appearance.   HENT:      Head: Normocephalic.      Nose: Nose normal.      Mouth/Throat:      Mouth: Mucous membranes are moist.   Eyes:      Extraocular Movements: Extraocular movements intact.      Pupils: Pupils are equal, round, and reactive to light.   Neck:      Comments: No jvd  Cardiovascular:      Pulses: Normal pulses.      Comments: Regular, S4  Pulmonary:      Effort: Pulmonary effort is normal.      Breath sounds: Normal breath sounds.   Abdominal:      General: Bowel sounds are normal.      Palpations: Abdomen is soft.   Musculoskeletal:      Comments:   Left knee surgical dressing intact   Skin:     General: Skin is warm and dry.   Neurological:      General: No focal deficit present.      Mental Status: He is alert.   Psychiatric:         Mood and Affect: Mood normal.         Behavior: Behavior normal.        Temp:  [36.6 °C (97.9 °F)-36.7 °C (98.1 °F)] 36.6 °C (97.9 °F)  Heart Rate:  [60-78] 60  Resp:  [16-17] 16  BP: (142-160)/(67-78) 142/69    Vitals:    12/28/24 1957   Weight: 93.4 kg (206 lb)           I/Os    Intake/Output Summary (Last 24 hours) at 1/2/2025 1622  Last data filed at 1/1/2025 2010  Gross per 24 hour   Intake 180 ml   Output 0 ml   Net 180 ml       Labs:   Results from last 72 hours   Lab Units 01/01/25  0536 12/31/24  0537   SODIUM mmol/L 139 136   POTASSIUM mmol/L 3.8 3.9   CHLORIDE mmol/L 105 102   CO2 mmol/L 26 23   BUN mg/dL 23 17   CREATININE mg/dL 1.00 0.95   GLUCOSE mg/dL 93 127*   CALCIUM mg/dL 8.3* 8.2*   ANION GAP mmol/L 12 15   EGFR  mL/min/1.73m*2 84 89      Results from last 72 hours   Lab Units 01/02/25  0625 12/31/24  0537   WBC AUTO x10*3/uL 7.5 9.8   HEMOGLOBIN g/dL 11.1* 11.8*   HEMATOCRIT % 33.9* 36.1*   PLATELETS AUTO x10*3/uL 247 236   NEUTROS PCT AUTO %  --  81.2   LYMPHS PCT AUTO %  --  7.9   MONOS PCT AUTO %  --  10.2   EOS PCT AUTO %  --  0.0      Lab Results   Component Value Date    CALCIUM 8.3 (L) 01/01/2025      Lab Results   Component Value Date    CRP 2.12 (H) 12/28/2024      [unfilled]     Micro/ID:   Susceptibility data from last 90 days.  Collected Specimen Info Organism Clindamycin Erythromycin Oxacillin Tetracycline Trimethoprim/Sulfamethoxazole Vancomycin   12/28/24 Fluid from Synovial Staphylococcus lugdunensis  S  S  S  S  S  S              Results from last 7 days   Lab Units 12/30/24  1809   FUNGAL CULTURE/SM, MISC  Culture in progress, a report will be issued when positive or after 2 weeks of incubation.   FUNGAL STAIN  No fungal elements seen      .ID  Lab Results   Component Value Date    BLOODCULT No growth at 4 days -  FINAL REPORT 12/29/2024    BLOODCULT No growth at 4 days -  FINAL REPORT 12/29/2024       Images:  Bedside PICC Imaging  These images are not reportable by radiology and will not be interpreted   by  Radiologists.       Meds    Scheduled medications  amLODIPine, 10 mg, oral, Daily  aspirin, 325 mg, oral, BID  cefTRIAXone, 2 g, intravenous, q24h  lidocaine, 5 mL, infiltration, Once  lidocaine, 5 mL, infiltration, Once  losartan, 100 mg, oral, Daily  pantoprazole, 40 mg, oral, Daily before breakfast  polyethylene glycol, 17 g, oral, Daily      Continuous medications     PRN medications  PRN medications: acetaminophen **OR** acetaminophen **OR** acetaminophen, alteplase, morphine, naloxone, ondansetron **OR** ondansetron, oxyCODONE, oxyCODONE, sodium chloride 0.9%, sodium chloride 0.9%, traMADol     Assessment and Plan        Gian Prado is a 64 y.o. male with pertinent medical history of  recent left total knee arthroplasty completed in August 2024 by Dr. Pelletier who presented to [facility] ER with complaints of worsening severe radiating left knee pain that began today and has had knee swelling since September.     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 [facility] PT/OT in place.  -Portable wound vac at bedside  -Pain meds dropped off by OP pharmacy  -Ortho and ID following    Hypertension  -Blood pressure fluctuating will continue losartan 100 mg daily and increase the amlodipine to 10 mg daily.  -Monitor BP per protocol    DVT prophylaxis  -Aspirin 325 mg p.o. twice daily    Dispo: Anticipate discharge home tomorrow after the dose of ceftriaxone. [facility] will come out on 1/4/25 to start the antibiotics at home.     SHEREEN Gunderson-CNP

## 2025-01-03 VITALS
SYSTOLIC BLOOD PRESSURE: 149 MMHG | RESPIRATION RATE: 18 BRPM | HEART RATE: 64 BPM | WEIGHT: 206 LBS | TEMPERATURE: 98.8 F | DIASTOLIC BLOOD PRESSURE: 73 MMHG | HEIGHT: 70 IN | OXYGEN SATURATION: 96 % | BODY MASS INDEX: 29.49 KG/M2

## 2025-01-03 LAB
ANION GAP SERPL CALC-SCNC: 11 MMOL/L (ref 10–20)
BUN SERPL-MCNC: 13 MG/DL (ref 6–23)
CALCIUM SERPL-MCNC: 9 MG/DL (ref 8.6–10.3)
CHLORIDE SERPL-SCNC: 102 MMOL/L (ref 98–107)
CO2 SERPL-SCNC: 28 MMOL/L (ref 21–32)
CREAT SERPL-MCNC: 0.86 MG/DL (ref 0.5–1.3)
EGFRCR SERPLBLD CKD-EPI 2021: >90 ML/MIN/1.73M*2
ERYTHROCYTE [DISTWIDTH] IN BLOOD BY AUTOMATED COUNT: 12.6 % (ref 11.5–14.5)
GLUCOSE SERPL-MCNC: 106 MG/DL (ref 74–99)
HCT VFR BLD AUTO: 36.1 % (ref 41–52)
HGB BLD-MCNC: 11.8 G/DL (ref 13.5–17.5)
MCH RBC QN AUTO: 29 PG (ref 26–34)
MCHC RBC AUTO-ENTMCNC: 32.7 G/DL (ref 32–36)
MCV RBC AUTO: 89 FL (ref 80–100)
NRBC BLD-RTO: 0 /100 WBCS (ref 0–0)
PLATELET # BLD AUTO: 288 X10*3/UL (ref 150–450)
POTASSIUM SERPL-SCNC: 3.9 MMOL/L (ref 3.5–5.3)
RBC # BLD AUTO: 4.07 X10*6/UL (ref 4.5–5.9)
SODIUM SERPL-SCNC: 137 MMOL/L (ref 136–145)
WBC # BLD AUTO: 9.9 X10*3/UL (ref 4.4–11.3)

## 2025-01-03 PROCEDURE — 85027 COMPLETE CBC AUTOMATED: CPT | Performed by: NURSE PRACTITIONER

## 2025-01-03 PROCEDURE — 2500000001 HC RX 250 WO HCPCS SELF ADMINISTERED DRUGS (ALT 637 FOR MEDICARE OP): Performed by: NURSE PRACTITIONER

## 2025-01-03 PROCEDURE — 99232 SBSQ HOSP IP/OBS MODERATE 35: CPT | Performed by: NURSE PRACTITIONER

## 2025-01-03 PROCEDURE — 99239 HOSP IP/OBS DSCHRG MGMT >30: CPT | Performed by: NURSE PRACTITIONER

## 2025-01-03 PROCEDURE — 80048 BASIC METABOLIC PNL TOTAL CA: CPT | Performed by: NURSE PRACTITIONER

## 2025-01-03 PROCEDURE — 2500000001 HC RX 250 WO HCPCS SELF ADMINISTERED DRUGS (ALT 637 FOR MEDICARE OP): Performed by: ORTHOPAEDIC SURGERY

## 2025-01-03 PROCEDURE — 2500000004 HC RX 250 GENERAL PHARMACY W/ HCPCS (ALT 636 FOR OP/ED): Performed by: ORTHOPAEDIC SURGERY

## 2025-01-03 RX ADMIN — LOSARTAN POTASSIUM 100 MG: 50 TABLET, FILM COATED ORAL at 09:02

## 2025-01-03 RX ADMIN — ASPIRIN 325 MG: 325 TABLET, COATED ORAL at 09:02

## 2025-01-03 RX ADMIN — CEFTRIAXONE SODIUM 2 G: 2 INJECTION, SOLUTION INTRAVENOUS at 05:47

## 2025-01-03 RX ADMIN — AMLODIPINE BESYLATE 10 MG: 10 TABLET ORAL at 09:09

## 2025-01-03 RX ADMIN — PANTOPRAZOLE SODIUM 40 MG: 40 TABLET, DELAYED RELEASE ORAL at 05:48

## 2025-01-03 RX ADMIN — OXYCODONE HYDROCHLORIDE 5 MG: 5 TABLET ORAL at 04:58

## 2025-01-03 ASSESSMENT — COGNITIVE AND FUNCTIONAL STATUS - GENERAL
DRESSING REGULAR LOWER BODY CLOTHING: A LITTLE
CLIMB 3 TO 5 STEPS WITH RAILING: A LITTLE
WALKING IN HOSPITAL ROOM: A LITTLE
TOILETING: A LITTLE
MOBILITY SCORE: 21
DAILY ACTIVITIY SCORE: 21
STANDING UP FROM CHAIR USING ARMS: A LITTLE
HELP NEEDED FOR BATHING: A LITTLE

## 2025-01-03 ASSESSMENT — PAIN SCALES - GENERAL
PAINLEVEL_OUTOF10: 4
PAINLEVEL_OUTOF10: 6

## 2025-01-03 NOTE — PROGRESS NOTES
01/03/25 1019   Discharge Planning   Living Arrangements Spouse/significant other   Support Systems Spouse/significant other;Children;Family members;Friends/neighbors   Assistance Needed [facility] pharmacy confirmed with patient he is agreeable to cost of medication. SOC confirmed with [facility] for 01/03/2024. Patient received IV Rocephin here and will get next dose tomorrow morning with [facility]. Patient will DC home today with no further needs from the care coordination team.   Type of Residence Private residence   Number of Stairs to Enter Residence 2   Number of Stairs Within Residence 0  (bedroom and bathroom is on main floor)   Who is requesting discharge planning? Provider   Home or Post Acute Services In home services   Type of Home Care Services Home PT;Home OT;Home nursing visits   Expected Discharge Disposition Home H   Does the patient need discharge transport arranged? No

## 2025-01-03 NOTE — PROGRESS NOTES
Gian Prado is a 64 y.o. male on day 5 of admission presenting with No Principal Problem: There is no principal problem currently on the Problem List. Please update the Problem List and refresh..    Subjective   Interval History: low grade temp, no new complaints      Review of Systems    Objective   Range of Vitals (last 24 hours)  Heart Rate:  [60-68]   Temp:  [36.6 °C (97.9 °F)-37.3 °C (99.1 °F)]   Resp:  [18]   BP: (142-152)/(69-74)   SpO2:  [95 %-96 %]   Daily Weight  12/28/24 : 93.4 kg (206 lb)    Body mass index is 29.56 kg/m².    Physical Exam  Constitutional:       Appearance: Normal appearance.   HENT:      Head: Normocephalic and atraumatic.      Mouth/Throat:      Mouth: Mucous membranes are moist.      Pharynx: Oropharynx is clear.   Eyes:      Pupils: Pupils are equal, round, and reactive to light.   Cardiovascular:      Rate and Rhythm: Normal rate and regular rhythm.      Heart sounds: Normal heart sounds.   Pulmonary:      Effort: Pulmonary effort is normal.      Breath sounds: Normal breath sounds.   Abdominal:      General: Abdomen is flat. Bowel sounds are normal.      Palpations: Abdomen is soft.   Musculoskeletal:      Cervical back: Normal range of motion.      Comments: Lt leg dressing   Neurological:      Mental Status: He is alert.         Antibiotics  cefTRIAXone - 2 gram, 2 gram/50 mL    Relevant Results  Labs  Results from last 72 hours   Lab Units 01/03/25  0601 01/02/25  0625   WBC AUTO x10*3/uL 9.9 7.5   HEMOGLOBIN g/dL 11.8* 11.1*   HEMATOCRIT % 36.1* 33.9*   PLATELETS AUTO x10*3/uL 288 247     Results from last 72 hours   Lab Units 01/03/25  0601 01/01/25  0536   SODIUM mmol/L 137 139   POTASSIUM mmol/L 3.9 3.8   CHLORIDE mmol/L 102 105   CO2 mmol/L 28 26   BUN mg/dL 13 23   CREATININE mg/dL 0.86 1.00   GLUCOSE mg/dL 106* 93   CALCIUM mg/dL 9.0 8.3*   ANION GAP mmol/L 11 12   EGFR mL/min/1.73m*2 >90 84         Estimated Creatinine Clearance: 98.4 mL/min (by C-G formula based on SCr  of 0.86 mg/dL).  C-Reactive Protein   Date Value Ref Range Status   12/28/2024 2.12 (H) <1.00 mg/dL Final     Microbiology  Susceptibility data from last 14 days.  Collected Specimen Info Organism Clindamycin Erythromycin Oxacillin Tetracycline Trimethoprim/Sulfamethoxazole Vancomycin   12/28/24 Fluid from Synovial Staphylococcus lugdunensis  S  S  S  S  S  S   Imaging  Reviewed        Assessment/Plan     Left knee prosthesis infection, sp I&D / poly exchange, the culture from OR  is negative, preoperative aspirate with Staph lugdunensis      Recommendations :  Continue Rocephin x 6 weeks with weekly labs  Discussed with the medical team     I spent minutes in the professional and overall care of this patient.      Janet Pemberton MD

## 2025-01-03 NOTE — PROGRESS NOTES
"Bj Prado \"Gian\" is a 64 y.o. male on day 5 of admission presenting with No Principal Problem: There is no principal problem currently on the Problem List. Please update the Problem List and refresh..    Subjective   No acute overnight events.  Pain in left knee controlled.  Denies chest pain, shortness of breath, nausea/vomiting.  Intermittent itching of left medial knee.       Objective     Physical Exam  Vitals reviewed.   Constitutional:       Appearance: Normal appearance.   HENT:      Head: Normocephalic and atraumatic.   Eyes:      Conjunctiva/sclera: Conjunctivae normal.      Pupils: Pupils are equal, round, and reactive to light.   Cardiovascular:      Rate and Rhythm: Normal rate and regular rhythm.      Pulses: Normal pulses.   Pulmonary:      Effort: Pulmonary effort is normal.   Abdominal:      Palpations: Abdomen is soft.   Musculoskeletal:      Cervical back: Neck supple.      Comments: Left knee prevena VAC in place to continuous suction, no output, thigh and calf supple, leg and foot warm and well perfused.  Left knee flexion 90 degrees, extension 0 degrees, quad strength 5/5, SILT S/S/SPN/DPN distributions, no foot drop, fully flexes and extends left ankle and foot    Skin:     General: Skin is warm and dry.      Capillary Refill: Capillary refill takes less than 2 seconds.   Neurological:      General: No focal deficit present.      Mental Status: He is alert and oriented to person, place, and time.   Psychiatric:         Mood and Affect: Mood normal.         Behavior: Behavior normal.         Thought Content: Thought content normal.         Judgment: Judgment normal.         Last Recorded Vitals  Blood pressure 149/73, pulse 64, temperature 37.1 °C (98.8 °F), resp. rate 18, height 1.778 m (5' 10\"), weight 93.4 kg (206 lb), SpO2 96%.  Intake/Output last 3 Shifts:  I/O last 3 completed shifts:  In: 670 (7.2 mL/kg) [P.O.:420; IV Piggyback:250]  Out: 0 (0 mL/kg)   Weight: 93.4 kg     Relevant " Results  Bedside PICC Imaging    Result Date: 12/31/2024  These images are not reportable by radiology and will not be interpreted by  Radiologists.    XR knee left 1-2 views    Result Date: 12/30/2024  Interpreted By:  Rigo Saini, STUDY: XR KNEE LEFT 1-2 VIEWS; ;  12/30/2024 10:49 pm   INDICATION: Signs/Symptoms:AP and Lateral in pacu Post op left knee I&D with Poly exchange.     COMPARISON: 12/28/2024   ACCESSION NUMBER(S): [id number]   ORDERING CLINICIAN: LIANG JIMENEZ   FINDINGS: There has been placement of numerous antibiotic impregnated beads within the left knee. There has been a washout performed of the left knee with intra-articular and periarticular soft tissue gas also noted. There is no evidence of perihardware lucency or fracture involving the pre-existing left total knee arthroplasty.       Post washout and antibiotic impregnated beads placement within the left knee.   No evidence of hardware loosening or periprosthetic fracture involving the left total knee arthroplasty.   MACRO: None.   Signed by: Rigo Saini 12/30/2024 11:47 PM Dictation workstation:   [id number]    XR knee left 3 views    Result Date: 12/28/2024  Interpreted By:  Molina Snow, STUDY: XR KNEE LEFT 3 VIEWS; ;  12/28/2024 9:08 pm   INDICATION: Signs/Symptoms:knee pain swelling warmth.     COMPARISON: None. Left knee radiographs 08/12/2024.   ACCESSION NUMBER(S): [id number]   ORDERING CLINICIAN: KEAGAN SAHU   FINDINGS: There is left total knee arthroplasty prosthesis without evidence of periprosthetic fracture or loosening. There is moderate suprapatellar knee joint effusion similar to prior. There is  new or increased infrapatellar soft tissue swelling.       Left knee arthroplasty without evidence of periprosthetic fracture or loosening. There is moderate suprapatellar knee joint effusion. There is new infrapatellar soft tissue swelling. Clinical correlation with direct inspection is recommended.      MACRO: None   Signed by: Molina Snow 12/28/2024 9:45 PM Dictation workstation:   [id number]     Scheduled medications  amLODIPine, 10 mg, oral, Daily  aspirin, 325 mg, oral, BID  cefTRIAXone, 2 g, intravenous, q24h  lidocaine, 5 mL, infiltration, Once  lidocaine, 5 mL, infiltration, Once  losartan, 100 mg, oral, Daily  pantoprazole, 40 mg, oral, Daily before breakfast  polyethylene glycol, 17 g, oral, Daily      Continuous medications     PRN medications  PRN medications: acetaminophen **OR** acetaminophen **OR** acetaminophen, alteplase, diphenhydramine-zinc acetate, morphine, naloxone, ondansetron **OR** ondansetron, oxyCODONE, oxyCODONE, sodium chloride 0.9%, sodium chloride 0.9%, traMADol  Results for orders placed or performed during the hospital encounter of 12/28/24 (from the past 24 hours)   CBC   Result Value Ref Range    WBC 9.9 4.4 - 11.3 x10*3/uL    nRBC 0.0 0.0 - 0.0 /100 WBCs    RBC 4.07 (L) 4.50 - 5.90 x10*6/uL    Hemoglobin 11.8 (L) 13.5 - 17.5 g/dL    Hematocrit 36.1 (L) 41.0 - 52.0 %    MCV 89 80 - 100 fL    MCH 29.0 26.0 - 34.0 pg    MCHC 32.7 32.0 - 36.0 g/dL    RDW 12.6 11.5 - 14.5 %    Platelets 288 150 - 450 x10*3/uL   Basic Metabolic Panel   Result Value Ref Range    Glucose 106 (H) 74 - 99 mg/dL    Sodium 137 136 - 145 mmol/L    Potassium 3.9 3.5 - 5.3 mmol/L    Chloride 102 98 - 107 mmol/L    Bicarbonate 28 21 - 32 mmol/L    Anion Gap 11 10 - 20 mmol/L    Urea Nitrogen 13 6 - 23 mg/dL    Creatinine 0.86 0.50 - 1.30 mg/dL    eGFR >90 >60 mL/min/1.73m*2    Calcium 9.0 8.6 - 10.3 mg/dL             This patient has a central line   Reason for the central line remaining today?  IV ABX    Assessment/Plan   Assessment & Plan  Knee effusion, left    64 year old male, POD 4 from I and D poly exchange of left knee    - AM labs reviewed  - WBAT BLE, PT treatments  - continue  mg BID x 4 weeks for DVT prophylaxis  - continue 2 grams IV Rocephin x 6 weeks via PICC line with weekly labs  -  maintain Prevena VAC to continuous suction  - pain controlled on current regimen  - regular diet with BR  - clear for DC from ortho stand point  - follow up with Dr. Shelby in 2 weeks           I spent 20 minutes in the professional and overall care of this patient.    SHEREEN Welsh-CNP

## 2025-01-03 NOTE — CARE PLAN
Problem: Pain - Adult  Goal: Verbalizes/displays adequate comfort level or baseline comfort level  Outcome: Progressing     Problem: Safety - Adult  Goal: Free from fall injury  Outcome: Progressing     Problem: Discharge Planning  Goal: Discharge to home or other facility with appropriate resources  Outcome: Progressing     Problem: Chronic Conditions and Co-morbidities  Goal: Patient's chronic conditions and co-morbidity symptoms are monitored and maintained or improved  Outcome: Progressing   The patient's goals for the shift include      The clinical goals for the shift include iv atb and pain control

## 2025-01-03 NOTE — CARE PLAN
The patient's goals for the shift include  IV abx and pain control    The clinical goals for the shift include IV abx and pain control

## 2025-01-03 NOTE — DISCHARGE INSTR - ACTIVITY
Light activity. Frequent rest with leg elevated and Ice. Continue exercises to increase range of motion. Work on getting the knee straight.   Total Knee Replacement Precautions for 6 weeks: NO running, NO jumping, NO squatting, NO kneeling, NO twisting, NO crossing on operative knee.   Continue to wear compression stockings everyday. May take stockings off at night to sleep but reapply each morning for the next 2 weeks until seen by your surgeon at your follow up appt.   Leave wound vac in place until follow up with the surgeon.    May not shower with wound vac in place.  for questions regarding wound vac call the surgeon at the office.

## 2025-01-04 ENCOUNTER — HOME CARE VISIT (OUTPATIENT)
Dept: HOME HEALTH SERVICES | Facility: HOME HEALTH | Age: 65
End: 2025-01-04
Payer: MEDICARE

## 2025-01-04 LAB
BACTERIA SPEC CULT: NORMAL
BACTERIA SPEC CULT: NORMAL
GRAM STN SPEC: NORMAL

## 2025-01-04 PROCEDURE — 169592 NO-PAY CLAIM PROCEDURE

## 2025-01-04 PROCEDURE — G0299 HHS/HOSPICE OF RN EA 15 MIN: HCPCS | Mod: HHH

## 2025-01-04 RX ADMIN — Medication 10 ML: at 09:00

## 2025-01-04 RX ADMIN — Medication 5 ML: at 09:00

## 2025-01-04 NOTE — DOCUMENTATION CLARIFICATION NOTE
"    PATIENT:               LIZZ ENCISO  ACCT #:                  [id number]  MRN:                       [id number]  :                       1960  ADMIT DATE:       2024 7:51 PM  DISCH DATE:        1/3/2025 10:45 AM  RESPONDING PROVIDER #:        [id number]          PROVIDER RESPONSE TEXT:    Excisional debridement of left knee joint    CDI QUERY TEXT:    Clarification    Instruction:    Based on your assessment of the patient and the clinical information, please provide the requested documentation by clicking on the appropriate radio button and enter any additional information if prompted.    Question: Please further clarify the debridement procedure as    When answering this query, please exercise your independent professional judgment. The fact that a question is being asked, does not imply that any particular answer is desired or expected.    The patient's clinical indicators include:  Clinical Information: This query refers to the procedure performed on 24 and documented as \"Left knee arthrotomy with incision irrigation and extensive debridement with polyethylene exchange, placement of stimulant dissolvable antibiotic beads and application of negative pressure wound therapy dressing\".    Per  Op Note: \"At this time extensive debridement performed with Bovie cautery and rongeur removing all inflamed visible tissue.  At this time patella was everted knee was hyperflexed tibia was translated anteriorly and posterior stabilized rotating platform polyethylene was then removed.  Debridement of the posterior recess of the knee was then performed with a curette and rongeur.\"  Options provided:  -- Excisional debridement of left knee joint  -- Non-excisional debridement down to and including subcutaneous tissue and fascia  -- Non-excisional debridement down to and including muscle  -- Non-excisional debridement down to and including bone, Please specify bone involved below  -- Other - I will add my own " diagnosis  -- Refer to Clinical Documentation Reviewer    Query created by: Rosalinda Gómez on 1/2/2025 4:04 PM      Electronically signed by:  LIANG JIMENEZ DO 1/3/2025 9:11 PM

## 2025-01-05 ENCOUNTER — HOME CARE VISIT (OUTPATIENT)
Dept: HOME HEALTH SERVICES | Facility: HOME HEALTH | Age: 65
End: 2025-01-05
Payer: MEDICARE

## 2025-01-05 VITALS
RESPIRATION RATE: 18 BRPM | TEMPERATURE: 98 F | HEART RATE: 67 BPM | SYSTOLIC BLOOD PRESSURE: 110 MMHG | DIASTOLIC BLOOD PRESSURE: 60 MMHG | OXYGEN SATURATION: 97 %

## 2025-01-05 LAB
BACTERIA SPEC CULT: NORMAL
GRAM STN SPEC: NORMAL
GRAM STN SPEC: NORMAL

## 2025-01-05 PROCEDURE — G0151 HHCP-SERV OF PT,EA 15 MIN: HCPCS | Mod: HHH

## 2025-01-05 PROCEDURE — G0299 HHS/HOSPICE OF RN EA 15 MIN: HCPCS | Mod: HHH

## 2025-01-05 RX ADMIN — Medication 5 ML: at 11:00

## 2025-01-05 RX ADMIN — Medication 10 ML: at 11:00

## 2025-01-05 SDOH — HEALTH STABILITY: PHYSICAL HEALTH
EXERCISE COMMENTS: ATIENT ON IMPORTANCE OF PERFORMING HEP 3X DAILY IN ORDER TO MAXIMIZE STRENGTH, ROM, AND FUNCTIONAL PERFORMANCE.

## 2025-01-05 SDOH — HEALTH STABILITY: PHYSICAL HEALTH
EXERCISE COMMENTS: PT INSTRUCTED PATIENT IN EXERCISES PER TKA PROTOCOL 1X10:  - ANKLE PUMPS  - GLUT SETS  - QUAD SETS  - HEELSLIDES  - HIP ABDUCTION  - SAQ  - LAQ  - SLR  - SEATED KNEE FLEXION STRETCH    PATIENT WAS CHALLENGED APPROPRIATELY BY EXERCISES.  PT EDUCATED P

## 2025-01-05 ASSESSMENT — ENCOUNTER SYMPTOMS
LOWEST PAIN SEVERITY IN PAST 24 HOURS: 0/10
HIGHEST PAIN SEVERITY IN PAST 24 HOURS: 7/10
PAIN LOCATION - EXACERBATING FACTORS: MOVEMENT
PAIN LOCATION - RELIEVING FACTORS: MEDICATION
SUBJECTIVE PAIN PROGRESSION: WAXING AND WANING
PAIN LOCATION - PAIN SEVERITY: 0/10
PAIN: 1
PERSON REPORTING PAIN: PATIENT
PAIN LOCATION: LEFT KNEE

## 2025-01-05 ASSESSMENT — ACTIVITIES OF DAILY LIVING (ADL): AMBULATION ASSISTANCE ON FLAT SURFACES: 1

## 2025-01-06 ENCOUNTER — PATIENT MESSAGE (OUTPATIENT)
Dept: PRIMARY CARE | Facility: CLINIC | Age: 65
End: 2025-01-06
Payer: MEDICARE

## 2025-01-06 ENCOUNTER — PATIENT OUTREACH (OUTPATIENT)
Dept: PRIMARY CARE | Facility: CLINIC | Age: 65
End: 2025-01-06
Payer: MEDICARE

## 2025-01-06 VITALS
RESPIRATION RATE: 20 BRPM | SYSTOLIC BLOOD PRESSURE: 120 MMHG | TEMPERATURE: 97.8 F | HEIGHT: 70 IN | HEART RATE: 70 BPM | BODY MASS INDEX: 28.63 KG/M2 | OXYGEN SATURATION: 98 % | WEIGHT: 200 LBS | DIASTOLIC BLOOD PRESSURE: 68 MMHG

## 2025-01-06 ASSESSMENT — ACTIVITIES OF DAILY LIVING (ADL)
AMBULATION ASSISTANCE: TWO PERSON
ENTERING_EXITING_HOME: TWO PERSON
BATHING ASSESSED: 1
PHYSICAL TRANSFERS ASSESSED: 1
CURRENT_FUNCTION: TWO PERSON
TOILETING: 1
CURRENT_FUNCTION: TWO PERSON
OASIS_M1830: 03
BATHING_CURRENT_FUNCTION: TWO PERSON
AMBULATION ASSISTANCE: TWO PERSON
AMBULATION ASSISTANCE: 1
TOILETING: TWO PERSON

## 2025-01-06 ASSESSMENT — ENCOUNTER SYMPTOMS
OCCASIONAL FEELINGS OF UNSTEADINESS: 1
LIMITED RANGE OF MOTION: 1
DEPRESSION: 0
MUSCLE WEAKNESS: 1
LIMITED RANGE OF MOTION: 1
MUSCLE WEAKNESS: 1
APPETITE LEVEL: GOOD
SHORTNESS OF BREATH: 1
HIGHEST PAIN SEVERITY IN PAST 24 HOURS: 4/10
PAIN LOCATION - PAIN QUALITY: SORE, ACHING, THROBBING
FATIGUES EASILY: 1
LOWEST PAIN SEVERITY IN PAST 24 HOURS: 2/10
PAIN LOCATION - PAIN SEVERITY: 4/10
PAIN SEVERITY GOAL: 0/10
PAIN: 1
PERSON REPORTING PAIN: PATIENT
PAIN LOCATION: LEFT KNEE
CHANGE IN APPETITE: UNCHANGED
LOSS OF SENSATION IN FEET: 0
DYSPNEA ACTIVITY LEVEL: AFTER AMBULATING 10 - 20 FT
PAIN LOCATION - PAIN FREQUENCY: FREQUENT
PAIN LOCATION - PAIN DURATION: SINCE SURGERY
PAIN LOCATION - EXACERBATING FACTORS: MOVEMENT, DOING TOO MUCH

## 2025-01-06 NOTE — PROGRESS NOTES
Discharge Facility: [facility]  Discharge Diagnosis: Acute left knee prosthesis  Admission Date: 12/29/24  Discharge Date: 1/3/25    PCP Appointment Date: No contact made. Task to office  Specialist Appointment Date: Unknown  Hospital Encounter and Summary Linked: Yes    Two attempts were made to reach patient within two business days after discharge. Voicemail left with contact information for patient to call back with any non-emergent questions or concerns.    Clif Wu LPN

## 2025-01-06 NOTE — CASE COMMUNICATION
Follow up with girlfriend/significant other for IV administration/PICC line care, and she is independently able to prepare and administer medications, flush the line and everything properly and safely    Patient has PT evaluation and assessment later today. Patient has not taken any pain medication, but has been in pain when walking /moving. I suggested taking medication prior to PT coming so that he will be able to do it comfortably.       Surgical dressing and pravena vac dressing remain intact. Rash is not getting worse, but remains the same upon assessment.    He did take benadryl prn the evening before.

## 2025-01-06 NOTE — CASE COMMUNICATION
Patient is admitted to homecare at this time. Agreeable to all services ordered - skilled nursing, physical therapy, and occupational therapy.    He lives at home with his girlfriend, and she is able to assist with care in the home, and outside of the home, including bathing, taking to appointments, meals, medications. She has previous experience with PICC line/antibiotic infusions, and she is willing and able to learn.    Patient to be  seen the following day if needed for follow up with IV administration of antibiotics, and process of flushing, preparing/mixing medication.   Nurse will follow up.    He uses a walker to ambulate.  Has Iceman and is using.    Taking pain medication as needed.    Appetite good. Fluid intake good.    Surgical dressing is intact and is ordered to be removed in office in two weeks, by surgeon. Please send new orders if any, once dressing i s removed. There is Pravena wound vac/dressing in place.      Patient expresses possible allergic reaction to both tegaderm and chlorhexidine and has visible rash near PICC line and knee dressing. He states surgeon is aware and he is instructed to take benadryl as needed. I have uploaded pictures of both rashes, and will continue to monitor.    To the pharmacy, please send hypoallergenic/senstive skin bandages for PICC line.      Isabel  at the office - Please add Dr. Rylan Shelby to the patient's chart, as he is the surgeon and will provide us with wound care orders, if any. He is not listed here for some reason. Thank you.

## 2025-01-06 NOTE — DISCHARGE SUMMARY
Discharge Diagnosis  Left knee prosthesis infection    Issues Requiring Follow-Up  I&D/poly exchange follow-up    Discharge Meds     Medication List      START taking these medications     aspirin 325 mg EC tablet; Take 1 tablet (325 mg) by mouth 2 times a day.   cefTRIAXone 2 gram; Commonly known as: Rocephin; Infuse 2 g into a   venous catheter once daily.   oxyCODONE 10 mg immediate release tablet; Commonly known as: Roxicodone;   Take 1 tablet (10 mg) by mouth every 6 hours if needed for severe pain (7   - 10).   senna 8.6 mg tablet; Generic drug: sennosides; Take 1 tablet (8.6 mg) by   mouth once daily.     CONTINUE taking these medications     albuterol 90 mcg/actuation inhaler   amLODIPine 5 mg tablet; Commonly known as: Norvasc; Take 1 tablet (5 mg)   by mouth once daily.    mg tablet; Generic drug: ibuprofen   losartan 100 mg tablet; Commonly known as: Cozaar; TAKE 1 TABLET BY   MOUTH ONCE DAILY   meloxicam 15 mg tablet; Commonly known as: Mobic   omeprazole 40 mg DR capsule; Commonly known as: PriLOSEC; TAKE ONE   CAPSULE BY MOUTH EVERY DAY. OVER DUE FOR APPOINTMENT, LAST REFILL   polyethylene glycol 17 gram/dose powder; Commonly known as: Glycolax,   Miralax; Take 17 g by mouth once daily.     ASK your doctor about these medications     alteplase 2 mg injection; Commonly known as: Cathflo Activase; 2 mL (2   mg) by intra-catheter route 1 time for 1 dose.; Ask about: Should I take   this medication?       Test Results Pending At Discharge  Pending Labs       Order Current Status    Fungal Culture/Smear Preliminary result    Fungal Culture/Smear Preliminary result    Fungal Culture/Smear Preliminary result    Fungal Culture/Smear Preliminary result            Hospital Course   Gian Prado is a 64 y.o. male with pertinent medical history of recent left total knee arthroplasty completed in August 2024 by Dr. Pelletier who presented to [facility] with complaints of worsening severe radiating left knee  pain that began today and has had knee swelling since September.      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 [facility] PT/OT in place.  -Portable wound vac provided  -Patient treated with ceftriaxone.  Home health care to start antibiotics at home tomorrow.  The patient will follow-up with Dr. Shelby in 2 weeks.  Discharged home with oxycodone, aspirin and senna.    Discharge time greater than 30 minutes    Pertinent Physical Exam At Time of Discharge  Physical Exam  Constitutional:       Appearance: Normal appearance.   HENT:      Head: Normocephalic.      Nose: Nose normal.      Mouth/Throat:      Mouth: Mucous membranes are moist.   Eyes:      Extraocular Movements: Extraocular movements intact.      Pupils: Pupils are equal, round, and reactive to light.   Neck:      Comments: No jvd  Cardiovascular:      Pulses: Normal pulses.      Comments: Regular, S4  Pulmonary:      Effort: Pulmonary effort is normal.      Breath sounds: Normal breath sounds.   Abdominal:      General: Bowel sounds are normal.      Palpations: Abdomen is soft.   Musculoskeletal:      Comments:   Left knee surgical dressing intact   Skin:     General: Skin is warm and dry.   Neurological:      General: No focal deficit present.      Mental Status: He is alert.   Psychiatric:         Mood and Affect: Mood normal.         Behavior: Behavior normal.       Outpatient Follow-Up  Future Appointments   Date Time Provider Department Center   1/8/2025 To Be Determined Elias Mejia, OT [facility]   1/9/2025 To Be Determined Sil Menjivar, PT [facility]   1/9/2025 10:00 AM Celena Diana RN [facility]   1/15/2025 To Be Determined Sil Menjivar, PT [facility]   1/16/2025  9:00 AM Celena Diana RN [facility]   1/17/2025 To Be  Determined Sil Menjivar, PT [facility]   1/22/2025 To Be Determined Sil Menjivar, PT [facility]   1/23/2025  6:30 AM Celena Diana RN [facility]   1/24/2025 To Be Determined Sil Menjiavr, PT [facility]   1/30/2025 To Be Determined Celena Diana RN [facility]   2/3/2025 12:30 PM Keith Doe DO [address]   2/6/2025 To Be Determined Celena Diana RN [facility]   2/13/2025 To Be Determined Celena Diana RN [facility]   2/20/2025 To Be Determined Celena Diana RN [facility]   2/27/2025 To Be Determined Celena Diana RN [facility]         Ariane Ruiz, APRN-CNP

## 2025-01-07 ENCOUNTER — HOME CARE VISIT (OUTPATIENT)
Dept: HOME HEALTH SERVICES | Facility: HOME HEALTH | Age: 65
End: 2025-01-07
Payer: MEDICARE

## 2025-01-07 LAB
FUNGUS SPEC CULT: NORMAL
FUNGUS SPEC FUNGUS STN: NORMAL

## 2025-01-07 NOTE — SIGNIFICANT EVENT
Follow Up Phone Call    Outgoing phone call    Spoke to: Bj Prado Relationship:self   Phone number: [phone]      Outcome: I left a message on answering machine   Chief Complaint   Patient presents with    Knee Pain          Diagnosis:Not applicable

## 2025-01-09 ENCOUNTER — HOME INFUSION (OUTPATIENT)
Dept: INFUSION THERAPY | Age: 65
End: 2025-01-09
Payer: MEDICARE

## 2025-01-09 ENCOUNTER — HOME CARE VISIT (OUTPATIENT)
Dept: HOME HEALTH SERVICES | Facility: HOME HEALTH | Age: 65
End: 2025-01-09
Payer: MEDICARE

## 2025-01-09 VITALS
TEMPERATURE: 98.4 F | RESPIRATION RATE: 18 BRPM | DIASTOLIC BLOOD PRESSURE: 73 MMHG | OXYGEN SATURATION: 95 % | SYSTOLIC BLOOD PRESSURE: 128 MMHG | HEART RATE: 70 BPM

## 2025-01-09 PROCEDURE — G0151 HHCP-SERV OF PT,EA 15 MIN: HCPCS | Mod: HHH

## 2025-01-09 SDOH — HEALTH STABILITY: PHYSICAL HEALTH
EXERCISE COMMENTS: TKA PROTOCOL X 10 REPS  -QUAD SET  -SLR  -ANKLE PUMPS  -LAQ    STANDING: X 10  -HEEL RAISE  -HIP ABDUCTION  -HAMSTRING CURL  -MARCH

## 2025-01-09 ASSESSMENT — ENCOUNTER SYMPTOMS
FEVER: 1
PAIN: 1
PERSON REPORTING PAIN: PATIENT

## 2025-01-09 NOTE — PROGRESS NOTES
PATIENT CONTINUES IV CEFTRIAXONE FOR L KNEE SEPTIC ARTHRITIS. THRU APPROX. 2/8/25  PER RN, LABS THIS WEEK ARE NOT BEING DONE UNTIL TOMORROW DUE TO PATIENTS SCHEDULE.  GOING FORWARD THEY WILL BE DONE ON TUESDAYS OR WEDNESDAYS.  T/C TO PATIENT FOR A CLINICAL UPDATE BUT NO ANSWER.  L/M THAT WE WOULD MAKE A DELIVERY FRIDAY BY 6PM WITH STANDARD SUPPLIES.  LEFT NUMBER IF HE NEEDS TO CALL WITH ANY SPECIFIC NEEDS.  PROCESSED FILL FOR 7 CEFTRIAXONE FOR MIX THURS AND DEL. FRI.  DOS 1/11 THRU 1/17. NF FOR 1/16 FOR OVN DEL.

## 2025-01-10 ENCOUNTER — LAB (OUTPATIENT)
Dept: LAB | Facility: LAB | Age: 65
End: 2025-01-10
Payer: MEDICARE

## 2025-01-10 ENCOUNTER — HOME CARE VISIT (OUTPATIENT)
Dept: HOME HEALTH SERVICES | Facility: HOME HEALTH | Age: 65
End: 2025-01-10
Payer: MEDICARE

## 2025-01-10 DIAGNOSIS — M25.462 KNEE EFFUSION, LEFT: ICD-10-CM

## 2025-01-10 DIAGNOSIS — M71.062 ABSCESS OF BURSA OF LEFT KNEE: ICD-10-CM

## 2025-01-10 DIAGNOSIS — M00.9 SEPTIC ARTHRITIS (MULTI): ICD-10-CM

## 2025-01-10 LAB
ALBUMIN SERPL BCP-MCNC: 3.8 G/DL (ref 3.4–5)
ALP SERPL-CCNC: 124 U/L (ref 33–136)
ALT SERPL W P-5'-P-CCNC: 68 U/L (ref 10–52)
ANION GAP SERPL CALC-SCNC: 10 MMOL/L (ref 10–20)
AST SERPL W P-5'-P-CCNC: 27 U/L (ref 9–39)
BILIRUB SERPL-MCNC: 0.4 MG/DL (ref 0–1.2)
BUN SERPL-MCNC: 15 MG/DL (ref 6–23)
CALCIUM SERPL-MCNC: 8.3 MG/DL (ref 8.6–10.3)
CHLORIDE SERPL-SCNC: 105 MMOL/L (ref 98–107)
CO2 SERPL-SCNC: 28 MMOL/L (ref 21–32)
CREAT SERPL-MCNC: 0.89 MG/DL (ref 0.5–1.3)
CRP SERPL-MCNC: 0.99 MG/DL
EGFRCR SERPLBLD CKD-EPI 2021: >90 ML/MIN/1.73M*2
ERYTHROCYTE [DISTWIDTH] IN BLOOD BY AUTOMATED COUNT: 12.8 % (ref 11.5–14.5)
ERYTHROCYTE [SEDIMENTATION RATE] IN BLOOD BY WESTERGREN METHOD: 28 MM/H (ref 0–20)
GLUCOSE SERPL-MCNC: 83 MG/DL (ref 74–99)
HCT VFR BLD AUTO: 33.8 % (ref 41–52)
HGB BLD-MCNC: 10.7 G/DL (ref 13.5–17.5)
MCH RBC QN AUTO: 29 PG (ref 26–34)
MCHC RBC AUTO-ENTMCNC: 31.7 G/DL (ref 32–36)
MCV RBC AUTO: 92 FL (ref 80–100)
NRBC BLD-RTO: 0 /100 WBCS (ref 0–0)
PLATELET # BLD AUTO: 431 X10*3/UL (ref 150–450)
POTASSIUM SERPL-SCNC: 4.3 MMOL/L (ref 3.5–5.3)
PROT SERPL-MCNC: 6.3 G/DL (ref 6.4–8.2)
RBC # BLD AUTO: 3.69 X10*6/UL (ref 4.5–5.9)
SODIUM SERPL-SCNC: 139 MMOL/L (ref 136–145)
WBC # BLD AUTO: 9.5 X10*3/UL (ref 4.4–11.3)

## 2025-01-10 PROCEDURE — G0299 HHS/HOSPICE OF RN EA 15 MIN: HCPCS | Mod: HHH

## 2025-01-10 PROCEDURE — 80053 COMPREHEN METABOLIC PANEL: CPT

## 2025-01-10 PROCEDURE — 86140 C-REACTIVE PROTEIN: CPT

## 2025-01-10 PROCEDURE — 85652 RBC SED RATE AUTOMATED: CPT

## 2025-01-10 PROCEDURE — 85027 COMPLETE CBC AUTOMATED: CPT

## 2025-01-10 RX ADMIN — Medication 10 ML: at 09:30

## 2025-01-10 RX ADMIN — Medication 5 ML: at 09:30

## 2025-01-11 VITALS
TEMPERATURE: 98 F | SYSTOLIC BLOOD PRESSURE: 120 MMHG | RESPIRATION RATE: 20 BRPM | DIASTOLIC BLOOD PRESSURE: 62 MMHG | HEART RATE: 68 BPM | OXYGEN SATURATION: 98 %

## 2025-01-11 ASSESSMENT — ENCOUNTER SYMPTOMS
MUSCLE WEAKNESS: 1
APPETITE LEVEL: GOOD
CHANGE IN APPETITE: UNCHANGED
LIMITED RANGE OF MOTION: 1

## 2025-01-11 ASSESSMENT — ACTIVITIES OF DAILY LIVING (ADL)
CURRENT_FUNCTION: ONE PERSON
AMBULATION ASSISTANCE: ONE PERSON

## 2025-01-13 ENCOUNTER — HOME CARE VISIT (OUTPATIENT)
Dept: HOME HEALTH SERVICES | Facility: HOME HEALTH | Age: 65
End: 2025-01-13
Payer: MEDICARE

## 2025-01-13 LAB
FUNGUS SPEC CULT: NORMAL
FUNGUS SPEC FUNGUS STN: NORMAL

## 2025-01-14 DIAGNOSIS — D64.9 ANEMIA, UNSPECIFIED TYPE: Primary | ICD-10-CM

## 2025-01-15 ENCOUNTER — HOME CARE VISIT (OUTPATIENT)
Dept: HOME HEALTH SERVICES | Facility: HOME HEALTH | Age: 65
End: 2025-01-15
Payer: MEDICARE

## 2025-01-15 ENCOUNTER — LAB REQUISITION (OUTPATIENT)
Dept: LAB | Facility: HOSPITAL | Age: 65
End: 2025-01-15
Payer: MEDICARE

## 2025-01-15 VITALS
OXYGEN SATURATION: 97 % | TEMPERATURE: 96.8 F | HEART RATE: 64 BPM | DIASTOLIC BLOOD PRESSURE: 80 MMHG | SYSTOLIC BLOOD PRESSURE: 130 MMHG | RESPIRATION RATE: 16 BRPM

## 2025-01-15 DIAGNOSIS — T84.54XA INFECTION AND INFLAMMATORY REACTION DUE TO INTERNAL LEFT KNEE PROSTHESIS, INITIAL ENCOUNTER (CMS-HCC): ICD-10-CM

## 2025-01-15 LAB
ANION GAP SERPL CALC-SCNC: 12 MMOL/L (ref 10–20)
BASOPHILS # BLD AUTO: 0.06 X10*3/UL (ref 0–0.1)
BASOPHILS NFR BLD AUTO: 0.9 %
BUN SERPL-MCNC: 18 MG/DL (ref 6–23)
CALCIUM SERPL-MCNC: 8.9 MG/DL (ref 8.6–10.3)
CHLORIDE SERPL-SCNC: 103 MMOL/L (ref 98–107)
CO2 SERPL-SCNC: 27 MMOL/L (ref 21–32)
CREAT SERPL-MCNC: 0.92 MG/DL (ref 0.5–1.3)
CRP SERPL-MCNC: 0.44 MG/DL
EGFRCR SERPLBLD CKD-EPI 2021: >90 ML/MIN/1.73M*2
EOSINOPHIL # BLD AUTO: 0.19 X10*3/UL (ref 0–0.7)
EOSINOPHIL NFR BLD AUTO: 2.9 %
ERYTHROCYTE [DISTWIDTH] IN BLOOD BY AUTOMATED COUNT: 12.9 % (ref 11.5–14.5)
ERYTHROCYTE [SEDIMENTATION RATE] IN BLOOD BY WESTERGREN METHOD: 40 MM/H (ref 0–20)
FERRITIN SERPL-MCNC: 300 NG/ML (ref 20–300)
FOLATE SERPL-MCNC: 15.4 NG/ML
GLUCOSE SERPL-MCNC: 85 MG/DL (ref 74–99)
HCT VFR BLD AUTO: 34.2 % (ref 41–52)
HGB BLD-MCNC: 11.1 G/DL (ref 13.5–17.5)
HGB RETIC QN: 31 PG (ref 28–38)
IMM GRANULOCYTES # BLD AUTO: 0.04 X10*3/UL (ref 0–0.7)
IMM GRANULOCYTES NFR BLD AUTO: 0.6 % (ref 0–0.9)
IMMATURE RETIC FRACTION: 17.4 %
IRON SATN MFR SERPL: 17 % (ref 25–45)
IRON SERPL-MCNC: 50 UG/DL (ref 35–150)
LYMPHOCYTES # BLD AUTO: 1.53 X10*3/UL (ref 1.2–4.8)
LYMPHOCYTES NFR BLD AUTO: 23.6 %
MCH RBC QN AUTO: 29.4 PG (ref 26–34)
MCHC RBC AUTO-ENTMCNC: 32.5 G/DL (ref 32–36)
MCV RBC AUTO: 91 FL (ref 80–100)
MONOCYTES # BLD AUTO: 0.75 X10*3/UL (ref 0.1–1)
MONOCYTES NFR BLD AUTO: 11.6 %
NEUTROPHILS # BLD AUTO: 3.92 X10*3/UL (ref 1.2–7.7)
NEUTROPHILS NFR BLD AUTO: 60.4 %
NRBC BLD-RTO: 0 /100 WBCS (ref 0–0)
PLATELET # BLD AUTO: 406 X10*3/UL (ref 150–450)
POTASSIUM SERPL-SCNC: 4.5 MMOL/L (ref 3.5–5.3)
RBC # BLD AUTO: 3.78 X10*6/UL (ref 4.5–5.9)
RETICS #: 0.06 X10*6/UL (ref 0.02–0.11)
RETICS/RBC NFR AUTO: 1.7 % (ref 0.5–2)
SODIUM SERPL-SCNC: 137 MMOL/L (ref 136–145)
TIBC SERPL-MCNC: 290 UG/DL (ref 240–445)
UIBC SERPL-MCNC: 240 UG/DL (ref 110–370)
VIT B12 SERPL-MCNC: 405 PG/ML (ref 211–911)
WBC # BLD AUTO: 6.5 X10*3/UL (ref 4.4–11.3)

## 2025-01-15 PROCEDURE — 83550 IRON BINDING TEST: CPT

## 2025-01-15 PROCEDURE — G0151 HHCP-SERV OF PT,EA 15 MIN: HCPCS | Mod: HHH

## 2025-01-15 PROCEDURE — 80048 BASIC METABOLIC PNL TOTAL CA: CPT

## 2025-01-15 PROCEDURE — 82607 VITAMIN B-12: CPT

## 2025-01-15 PROCEDURE — 86140 C-REACTIVE PROTEIN: CPT

## 2025-01-15 PROCEDURE — G0162 HHC RN E&M PLAN SVS, 15 MIN: HCPCS | Mod: HHH

## 2025-01-15 PROCEDURE — 83540 ASSAY OF IRON: CPT

## 2025-01-15 PROCEDURE — 85045 AUTOMATED RETICULOCYTE COUNT: CPT

## 2025-01-15 PROCEDURE — 82728 ASSAY OF FERRITIN: CPT

## 2025-01-15 PROCEDURE — 82746 ASSAY OF FOLIC ACID SERUM: CPT

## 2025-01-15 PROCEDURE — 85025 COMPLETE CBC W/AUTO DIFF WBC: CPT

## 2025-01-15 PROCEDURE — 85652 RBC SED RATE AUTOMATED: CPT

## 2025-01-15 RX ADMIN — Medication 5 ML: at 09:00

## 2025-01-15 RX ADMIN — Medication 20 ML: at 09:00

## 2025-01-15 SDOH — HEALTH STABILITY: PHYSICAL HEALTH
EXERCISE COMMENTS: TKA PROTOCOL X 10 REPS  -ANKLE PUMPS  QUAD SETS  -GLUTE SETS  -HEEL SLIDES  -SLR    SEATED X 15  HEEL SLIDE  KNEE EXTENSION STRETCH   LAQ    STANDING: X 15  -HEEL RAISE  -MINI SQUAT  -HIP ABDUCTION  -HAMSTRING CURL  -HIP EXTENSION  -MARCH

## 2025-01-15 ASSESSMENT — PAIN SCALES - PAIN ASSESSMENT IN ADVANCED DEMENTIA (PAINAD)
FACIALEXPRESSION: 0
NEGVOCALIZATION: 0 - NONE.
BODYLANGUAGE: 0 - RELAXED.
NEGVOCALIZATION: 0
CONSOLABILITY: 0
BREATHING: 0
CONSOLABILITY: 0 - NO NEED TO CONSOLE.
FACIALEXPRESSION: 0 - SMILING OR INEXPRESSIVE.
TOTALSCORE: 0
BODYLANGUAGE: 0

## 2025-01-15 ASSESSMENT — ENCOUNTER SYMPTOMS
PERSON REPORTING PAIN: PATIENT
PAIN: 1
PAIN LOCATION: LEFT KNEE
PAIN LOCATION - PAIN SEVERITY: 2/10

## 2025-01-16 ENCOUNTER — PATIENT OUTREACH (OUTPATIENT)
Dept: PRIMARY CARE | Facility: CLINIC | Age: 65
End: 2025-01-16
Payer: MEDICARE

## 2025-01-16 ENCOUNTER — HOME INFUSION (OUTPATIENT)
Dept: INFUSION THERAPY | Age: 65
End: 2025-01-16
Payer: MEDICARE

## 2025-01-16 NOTE — PROGRESS NOTES
Unable to reach patient for call back after recent hospitalization.   LVM with call back number for patient to call if needed   If no voicemail available call attempts x 2 were made to contact the patient to assist with any questions or concerns patient may have.    Clif Wu LPN

## 2025-01-16 NOTE — PROGRESS NOTES
Chart review - Bj Prado is a 65 y.o. patient on home care for L knee septic arthritis.  Med - ceftriaxone 2 gm q24 thru 2/8/25. Dr. Pemberton following    Labs from 1/15 reviewed- unremarkable  RN visits reviewed- No issues noted with infusions or line    Rph call to pt, tolerating infusions well, confirmed doses in home thru 1/17. Agreeable to delivery of another week of medication and supplies.     Rph offered to  - pt stated no questions at this time.    Pharmacy to mix 1/16 and deliver 1/17 with supplies to match:  7x ceftriaxone 2 gm MB+  DOS: 1/18 - 1/24    Follow up 1/23 - check labs, deliver OVN

## 2025-01-17 ENCOUNTER — HOME CARE VISIT (OUTPATIENT)
Dept: HOME HEALTH SERVICES | Facility: HOME HEALTH | Age: 65
End: 2025-01-17
Payer: MEDICARE

## 2025-01-17 VITALS
OXYGEN SATURATION: 99 % | SYSTOLIC BLOOD PRESSURE: 126 MMHG | RESPIRATION RATE: 18 BRPM | HEART RATE: 69 BPM | TEMPERATURE: 96.3 F | DIASTOLIC BLOOD PRESSURE: 80 MMHG

## 2025-01-17 PROCEDURE — G0151 HHCP-SERV OF PT,EA 15 MIN: HCPCS | Mod: HHH

## 2025-01-17 SDOH — HEALTH STABILITY: PHYSICAL HEALTH
EXERCISE COMMENTS: TKA PROTOCOL X 15 REPS  QUAD SETS  -HEEL SLIDES  -SLR  - HAMSTRING CURLS WITH GREEN BAND SEATED  STANDING: X10  -HEEL RAISE  -MINI SQUAT  -HIP ABDUCTION  -HAMSTRING CURL  -HIP EXTENSION  -MARCH  -TERMINAL KNEE EXTENSION WITH GREEN BAND  - KNEE FLEXIO

## 2025-01-17 SDOH — HEALTH STABILITY: PHYSICAL HEALTH: EXERCISE COMMENTS: N ON STEP

## 2025-01-17 ASSESSMENT — ACTIVITIES OF DAILY LIVING (ADL): AMBULATION ASSISTANCE ON FLAT SURFACES: 1

## 2025-01-20 LAB
FUNGUS SPEC CULT: NORMAL
FUNGUS SPEC FUNGUS STN: NORMAL

## 2025-01-21 ENCOUNTER — LAB REQUISITION (OUTPATIENT)
Dept: LAB | Facility: LAB | Age: 65
End: 2025-01-21
Payer: MEDICARE

## 2025-01-21 ENCOUNTER — HOME CARE VISIT (OUTPATIENT)
Dept: HOME HEALTH SERVICES | Facility: HOME HEALTH | Age: 65
End: 2025-01-21
Payer: MEDICARE

## 2025-01-21 VITALS
OXYGEN SATURATION: 99 % | SYSTOLIC BLOOD PRESSURE: 120 MMHG | TEMPERATURE: 97.9 F | DIASTOLIC BLOOD PRESSURE: 62 MMHG | HEART RATE: 70 BPM | RESPIRATION RATE: 20 BRPM

## 2025-01-21 DIAGNOSIS — T84.54XA INFECTION AND INFLAMMATORY REACTION DUE TO INTERNAL LEFT KNEE PROSTHESIS, INITIAL ENCOUNTER (CMS-HCC): ICD-10-CM

## 2025-01-21 LAB
ALBUMIN SERPL BCP-MCNC: 3.8 G/DL (ref 3.4–5)
ALP SERPL-CCNC: 95 U/L (ref 33–136)
ALT SERPL W P-5'-P-CCNC: 24 U/L (ref 10–52)
ANION GAP SERPL CALC-SCNC: 14 MMOL/L (ref 10–20)
AST SERPL W P-5'-P-CCNC: 17 U/L (ref 9–39)
BASOPHILS # BLD AUTO: 0.05 X10*3/UL (ref 0–0.1)
BASOPHILS NFR BLD AUTO: 0.8 %
BILIRUB SERPL-MCNC: 0.4 MG/DL (ref 0–1.2)
BUN SERPL-MCNC: 16 MG/DL (ref 6–23)
CALCIUM SERPL-MCNC: 8.5 MG/DL (ref 8.6–10.3)
CHLORIDE SERPL-SCNC: 105 MMOL/L (ref 98–107)
CO2 SERPL-SCNC: 25 MMOL/L (ref 21–32)
CREAT SERPL-MCNC: 0.95 MG/DL (ref 0.5–1.3)
CRP SERPL-MCNC: 0.26 MG/DL
EGFRCR SERPLBLD CKD-EPI 2021: 88 ML/MIN/1.73M*2
EOSINOPHIL # BLD AUTO: 0.23 X10*3/UL (ref 0–0.7)
EOSINOPHIL NFR BLD AUTO: 3.5 %
ERYTHROCYTE [DISTWIDTH] IN BLOOD BY AUTOMATED COUNT: 13.5 % (ref 11.5–14.5)
ERYTHROCYTE [SEDIMENTATION RATE] IN BLOOD BY WESTERGREN METHOD: 19 MM/H (ref 0–20)
GLUCOSE SERPL-MCNC: 111 MG/DL (ref 74–99)
HCT VFR BLD AUTO: 35.1 % (ref 41–52)
HGB BLD-MCNC: 11.1 G/DL (ref 13.5–17.5)
IMM GRANULOCYTES # BLD AUTO: 0.03 X10*3/UL (ref 0–0.7)
IMM GRANULOCYTES NFR BLD AUTO: 0.5 % (ref 0–0.9)
LYMPHOCYTES # BLD AUTO: 1.42 X10*3/UL (ref 1.2–4.8)
LYMPHOCYTES NFR BLD AUTO: 21.6 %
MCH RBC QN AUTO: 29.4 PG (ref 26–34)
MCHC RBC AUTO-ENTMCNC: 31.6 G/DL (ref 32–36)
MCV RBC AUTO: 93 FL (ref 80–100)
MONOCYTES # BLD AUTO: 0.62 X10*3/UL (ref 0.1–1)
MONOCYTES NFR BLD AUTO: 9.4 %
NEUTROPHILS # BLD AUTO: 4.23 X10*3/UL (ref 1.2–7.7)
NEUTROPHILS NFR BLD AUTO: 64.2 %
NRBC BLD-RTO: 0 /100 WBCS (ref 0–0)
PLATELET # BLD AUTO: 293 X10*3/UL (ref 150–450)
POTASSIUM SERPL-SCNC: 4.2 MMOL/L (ref 3.5–5.3)
PROT SERPL-MCNC: 6.6 G/DL (ref 6.4–8.2)
RBC # BLD AUTO: 3.77 X10*6/UL (ref 4.5–5.9)
SODIUM SERPL-SCNC: 140 MMOL/L (ref 136–145)
WBC # BLD AUTO: 6.6 X10*3/UL (ref 4.4–11.3)

## 2025-01-21 PROCEDURE — G0299 HHS/HOSPICE OF RN EA 15 MIN: HCPCS | Mod: HHH

## 2025-01-21 PROCEDURE — 85025 COMPLETE CBC W/AUTO DIFF WBC: CPT

## 2025-01-21 PROCEDURE — 86140 C-REACTIVE PROTEIN: CPT

## 2025-01-21 PROCEDURE — 80053 COMPREHEN METABOLIC PANEL: CPT

## 2025-01-21 PROCEDURE — 85652 RBC SED RATE AUTOMATED: CPT

## 2025-01-21 RX ADMIN — Medication 10 ML: at 10:00

## 2025-01-21 RX ADMIN — Medication 5 ML: at 10:00

## 2025-01-21 ASSESSMENT — ACTIVITIES OF DAILY LIVING (ADL)
CURRENT_FUNCTION: ONE PERSON
AMBULATION ASSISTANCE: ONE PERSON

## 2025-01-21 ASSESSMENT — ENCOUNTER SYMPTOMS
LIMITED RANGE OF MOTION: 1
DENIES PAIN: 1
HIGHEST PAIN SEVERITY IN PAST 24 HOURS: 0/10
PAIN SEVERITY GOAL: 0/10
CHANGE IN APPETITE: UNCHANGED
PERSON REPORTING PAIN: PATIENT
LOWEST PAIN SEVERITY IN PAST 24 HOURS: 0/10
APPETITE LEVEL: GOOD

## 2025-01-21 NOTE — CASE COMMUNICATION
contacted me today, after I dropped off ordered labs to the [facility] outpatient lab, stating that BMP and CMP are the same panel - but that the CMP has more in panel. Both of them are ordered for this patient from Start of care orders for infusion. Unsure why they didnt see this in the weeks prior that we have been seeing patient, drawing his labs, but I have removed BMP and kept CMP since it's a BMP with more in the  panel.     Please let me know if there is anything further that you need from me/us.    Thanks

## 2025-01-22 ENCOUNTER — HOME CARE VISIT (OUTPATIENT)
Dept: HOME HEALTH SERVICES | Facility: HOME HEALTH | Age: 65
End: 2025-01-22
Payer: MEDICARE

## 2025-01-22 VITALS
DIASTOLIC BLOOD PRESSURE: 74 MMHG | SYSTOLIC BLOOD PRESSURE: 121 MMHG | RESPIRATION RATE: 18 BRPM | TEMPERATURE: 97 F | HEART RATE: 62 BPM | OXYGEN SATURATION: 99 %

## 2025-01-22 PROCEDURE — G0151 HHCP-SERV OF PT,EA 15 MIN: HCPCS | Mod: HHH

## 2025-01-22 SDOH — HEALTH STABILITY: PHYSICAL HEALTH
EXERCISE COMMENTS: STANDING: X 20  -HEEL RAISE  -MINI SQUAT  -HIP ABDUCTION  -HAMSTRING CURL  -HIP EXTENSION  -MARCH  -TERMINAL KNEE EXTENSION WITH BAND  - HAMSTRING CURLS WITH BAND SEATED    -SEATED STATIONARY BIKE, HALF REVOLUTIONS 3 MIN

## 2025-01-22 ASSESSMENT — ACTIVITIES OF DAILY LIVING (ADL)
AMBULATION ASSISTANCE ON FLAT SURFACES: 1
AMBULATION_DISTANCE/DURATION_TOLERATED: 300 FEET

## 2025-01-22 ASSESSMENT — ENCOUNTER SYMPTOMS
PAIN LOCATION - PAIN SEVERITY: 1/10
PERSON REPORTING PAIN: PATIENT
PAIN: 1
PAIN LOCATION: RIGHT KNEE

## 2025-01-23 ENCOUNTER — DOCUMENTATION (OUTPATIENT)
Dept: INFUSION THERAPY | Age: 65
End: 2025-01-23
Payer: MEDICARE

## 2025-01-23 ENCOUNTER — HOME INFUSION (OUTPATIENT)
Dept: INFUSION THERAPY | Age: 65
End: 2025-01-23
Payer: MEDICARE

## 2025-01-23 NOTE — PROGRESS NOTES
Spoke w/ patient - delivery of the infusion medication and all supplies, including standard amount of flushes and dressing, is scheduled for tomorrow evening. No questions to [facility] at this time.

## 2025-01-23 NOTE — PROGRESS NOTES
Chart review - Bj Prado is a 65 y.o. patient on home care for L knee septic arthritis.  Med - ceftriaxone 2 gm q24 thru 2/8/25. Dr. Pemberton following     Labs from 1/21 reviewed- unremarkable  RN visits reviewed- No issues noted with infusions or line     Pharmacy to mix 1/23 and deliver 1/24 with supplies to match:  7x ceftriaxone 2 gm MB+  DOS: 1/25 - 1/31     Follow up 1/30 - check labs, deliver remainder OVN

## 2025-01-24 ENCOUNTER — HOME CARE VISIT (OUTPATIENT)
Dept: HOME HEALTH SERVICES | Facility: HOME HEALTH | Age: 65
End: 2025-01-24
Payer: MEDICARE

## 2025-01-26 DIAGNOSIS — I10 BENIGN HYPERTENSION: ICD-10-CM

## 2025-01-26 DIAGNOSIS — K21.9 GASTROESOPHAGEAL REFLUX DISEASE WITHOUT ESOPHAGITIS: ICD-10-CM

## 2025-01-26 DIAGNOSIS — M25.50 PAIN IN UNSPECIFIED JOINT: ICD-10-CM

## 2025-01-27 RX ORDER — MELOXICAM 15 MG/1
15 TABLET ORAL DAILY PRN
Qty: 90 TABLET | Refills: 3 | Status: SHIPPED | OUTPATIENT
Start: 2025-01-27

## 2025-01-27 RX ORDER — OMEPRAZOLE 40 MG/1
CAPSULE, DELAYED RELEASE ORAL
Qty: 90 CAPSULE | Refills: 3 | Status: SHIPPED | OUTPATIENT
Start: 2025-01-27

## 2025-01-27 RX ORDER — AMLODIPINE BESYLATE 5 MG/1
5 TABLET ORAL DAILY
Qty: 90 TABLET | Refills: 3 | Status: SHIPPED | OUTPATIENT
Start: 2025-01-27

## 2025-01-29 ENCOUNTER — HOME CARE VISIT (OUTPATIENT)
Dept: HOME HEALTH SERVICES | Facility: HOME HEALTH | Age: 65
End: 2025-01-29
Payer: MEDICARE

## 2025-01-29 VITALS
DIASTOLIC BLOOD PRESSURE: 82 MMHG | HEART RATE: 66 BPM | TEMPERATURE: 96.5 F | RESPIRATION RATE: 18 BRPM | OXYGEN SATURATION: 99 % | SYSTOLIC BLOOD PRESSURE: 135 MMHG

## 2025-01-29 PROCEDURE — G0299 HHS/HOSPICE OF RN EA 15 MIN: HCPCS | Mod: HHH

## 2025-01-29 PROCEDURE — G0151 HHCP-SERV OF PT,EA 15 MIN: HCPCS | Mod: HHH

## 2025-01-29 RX ADMIN — Medication 5 ML: at 10:00

## 2025-01-29 RX ADMIN — Medication 10 ML: at 10:00

## 2025-01-29 SDOH — HEALTH STABILITY: PHYSICAL HEALTH
EXERCISE COMMENTS: X 5 EACH SINGLE LEG STEP DOWN  LATERAL STEPPING 4 X 20 FEET  STATIONARY BIKE FORWARD AND BACKWARD X 2 MIN  STANDING: X20 BILATERALLY  -HEEL RAISE  -MINI SQUAT  -HIP ABDUCTION  -HAMSTRING CURL  -HIP EXTENSION  -MARCH

## 2025-01-29 ASSESSMENT — ENCOUNTER SYMPTOMS
PAIN: 1
APPETITE LEVEL: GOOD
MUSCLE WEAKNESS: 1
CHANGE IN APPETITE: UNCHANGED
PERSON REPORTING PAIN: PATIENT
PERSON REPORTING PAIN: PATIENT
PAIN LOCATION - PAIN SEVERITY: 1/10
LIMITED RANGE OF MOTION: 1
PAIN LOCATION: LEFT KNEE
DENIES PAIN: 1

## 2025-01-29 ASSESSMENT — ACTIVITIES OF DAILY LIVING (ADL)
AMBULATION_DISTANCE/DURATION_TOLERATED: 150
CURRENT_FUNCTION: TWO PERSON
AMBULATION ASSISTANCE ON FLAT SURFACES: 1
AMBULATION ASSISTANCE: ONE PERSON

## 2025-01-29 NOTE — CASE COMMUNICATION
Patient seen today for routine skilled nursing visit. PICC line dressing changed as ordered. He tolerated procedure well. Flushing well, good blood return.      Was seen last week at surgeon's office. Surgeon wanted him to make appointment with [facility] to Dr. Zelaya, patient states he sees Dr. Pemberton later this afternoon.    To both physicians and pharmacy, bloodwork was done today and after visit it was brought to the [facility] outpatient lab at 1130 am.

## 2025-01-29 NOTE — CASE COMMUNICATION
Please add Dr. Janet Pemberton to the patient's chart. He is infectious disease doctor following the patient's case.    We need him to be added so that we are able to communicate with him here, but also because we have to have his information listed here in order to correctly fill out lab requisition paperwork.    I am sending again as this is the second request to add this physician. Thank you

## 2025-01-30 ENCOUNTER — HOME INFUSION (OUTPATIENT)
Dept: INFUSION THERAPY | Age: 65
End: 2025-01-30
Payer: MEDICARE

## 2025-01-30 DIAGNOSIS — M25.462 KNEE EFFUSION, LEFT: Primary | ICD-10-CM

## 2025-01-30 NOTE — PROGRESS NOTES
Chart Review: Patient ordered Ceftriaxone 2g IV q24 thorugh 2/8 for Left Knee Septic Arthritis    Labs drawn 1/29 to be processed through [facility] -   Telephone call with patient: Appt with Dr Pemberton 1/29 afternoon - IV antibiotics to stop with dose on 2/8.  Has RX for PO antibiotics to start on 2/9    Dispense x8 doses of Ceftriaxone in MB+ for cmpd 1/30 and delivery on 1/31 -   Infusions 2/1 - 2/8    Order entered into EPIC to pull PICC after last dose of Ceftriaxone     PICC to be pulled on 2/10 - discharge from pharmacy service after this date

## 2025-01-31 ENCOUNTER — HOME CARE VISIT (OUTPATIENT)
Dept: HOME HEALTH SERVICES | Facility: HOME HEALTH | Age: 65
End: 2025-01-31
Payer: MEDICARE

## 2025-01-31 VITALS
RESPIRATION RATE: 18 BRPM | HEART RATE: 63 BPM | TEMPERATURE: 97.5 F | SYSTOLIC BLOOD PRESSURE: 148 MMHG | OXYGEN SATURATION: 99 % | DIASTOLIC BLOOD PRESSURE: 79 MMHG

## 2025-01-31 PROCEDURE — G0151 HHCP-SERV OF PT,EA 15 MIN: HCPCS | Mod: HHH

## 2025-01-31 SDOH — HEALTH STABILITY: PHYSICAL HEALTH
EXERCISE COMMENTS: 5 MIN STATIONARY BIKE    STANDING: X 20 REPS BIL  -HEEL RAISE  -MINI SQUAT  -HIP ABDUCTION  -HAMSTRING CURL  -HIP EXTENSION  -MARCH    SEATED WITH THERA BAND X20  LAQ  HAMSTRING CURLS    TERMINAL KNEE EXTENSION

## 2025-02-04 ENCOUNTER — HOME CARE VISIT (OUTPATIENT)
Dept: HOME HEALTH SERVICES | Facility: HOME HEALTH | Age: 65
End: 2025-02-04
Payer: MEDICARE

## 2025-02-04 PROCEDURE — G0299 HHS/HOSPICE OF RN EA 15 MIN: HCPCS | Mod: HHH

## 2025-02-04 RX ADMIN — Medication 5 ML: at 09:00

## 2025-02-04 RX ADMIN — Medication 10 ML: at 09:00

## 2025-02-04 NOTE — CASE COMMUNICATION
[facility]   part of [facility]      [facility] Progress Note    Juan Pablo Lam Patient Status:  Inpatient    1959 MRN [id number]   Location [facility] 8NE-A Attending Mathew Grewal MD   Hosp Day # 4 PCP Parker Clancy MD     Subjective:  Feeling well, looking great. C/o left wrist being limp and grasp is weak    Objective:  /68 (BP Location: Right arm)   Pulse 78   Temp 98.9 °F (37.2 °C) (Oral)   Resp 22   Ht 177.8 cm (5' 10\")   Wt 100.8 kg (222 lb 3.6 oz)   SpO2 93%   BMI 31.89 kg/m²          Intake/Output:    Intake/Output Summary (Last 24 hours) at 2025 1104  Last data filed at 2025 0900  Gross per 24 hour   Intake 600 ml   Output 1700 ml   Net -1100 ml     Last 3 Weights   25 0532 100.8 kg (222 lb 3.6 oz)   25 0600 103.7 kg (228 lb 9.6 oz)   25 0500 103.2 kg (227 lb 8.2 oz)   25 0530 102.1 kg (225 lb 1.6 oz)   01/15/25 1743 102.1 kg (225 lb)   25 0842 103 kg (227 lb)   22 1207 100.7 kg (222 lb)     Allergies:  Allergies[1]    Current Facility-Administered Medications   Medication Dose Route Frequency    potassium chloride (Klor-Con M20) tab 40 mEq  40 mEq Oral Q4H    guaiFENesin ER (Mucinex) 12 hr tab 1,200 mg  1,200 mg Oral BID    furosemide (Lasix) 10 mg/mL injection 40 mg  40 mg Intravenous BID (Diuretic)    acetaminophen (Tylenol Extra Strength) tab 1,000 mg  1,000 mg Oral Q6H PRN    insulin aspart (NovoLOG) 100 Units/mL FlexPen 1-5 Units  1-5 Units Subcutaneous TID AC and HS    HYDROcodone-acetaminophen (Norco) 5-325 MG per tab 1 tablet  1 tablet Oral Q4H PRN    Or    HYDROcodone-acetaminophen (Norco) 5-325 MG per tab 2 tablet  2 tablet Oral Q4H PRN    rosuvastatin (Crestor) tab 40 mg  40 mg Oral Nightly    ipratropium-albuterol (Duoneb) 0.5-2.5 (3) MG/3ML inhalation solution 3 mL  3 mL Nebulization Q4H PRN    aspirin chewable tab 81 mg  81 mg Oral Daily    sodium chloride 0.9% infusion   Intravenous Continuous    melatonin  Patient's girlfriend reached out to this nurse, stating that last week's lab results were not showing up. They were drawn last week, and on my end, I am also not seeing any lab results.    I am in the process of drawing his labs again today and will be dropped off at the outpatient lab/[facility] and will inquire about the results of last week's labs.    Maybe/possibly the results were faxed to the doctors and pharmacy and we just didnt  get them?     states that according to their records, they were resulted out on 1.30. Unsure why they arent visible to me or the patient.    Wanted to let all know.    Additionally, patient states that he had a fall on Sunday evening, falling directly on his surgical leg - which is now swollen, bruised, and he feels it's filling up with fluid again.  He did not seek any medical attention and I had him make appointment with  surgeon or if he couldnt get in, go to the ER, to have xrayed. Surgeon's office was able to get him in same day.  Will follow up with patient later today after appointment    To PT, who is planning to see patient tomorrow - check to see if there are any new precautions.    To MDs, pharmacy, I am scheduled to remove his PICC line after this weekend's last dose. Should the PICC line remain in, I Have already educated girlfriend on the ne ed to flush daily for maintenance, but I will plan to remove as ordered, unless I hear differently. tab 3 mg  3 mg Oral Nightly PRN    sennosides (Senokot) tab 8.6 mg  8.6 mg Oral BID    docusate sodium (Colace) cap 100 mg  100 mg Oral BID    polyethylene glycol (PEG 3350) (Miralax) 17 g oral packet 17 g  17 g Oral Daily PRN    bisacodyl (Dulcolax) 10 MG rectal suppository 10 mg  10 mg Rectal Daily PRN    ondansetron (Zofran) 4 MG/2ML injection 4 mg  4 mg Intravenous Q6H PRN    metoprolol tartrate (Lopressor) partial tab 12.5 mg  12.5 mg Oral 2x Daily(Beta Blocker)    mupirocin (Bactroban) 2% nasal ointment 1 Application  1 Application Nasal BID    morphINE PF 2 MG/ML injection 2 mg  2 mg Intravenous Q2H PRN    Or    morphINE PF 4 MG/ML injection 4 mg  4 mg Intravenous Q2H PRN    pantoprazole (Protonix) 40 mg in sodium chloride 0.9% PF 10 mL IV push  40 mg Intravenous QAM AC    Or    pantoprazole (Protonix) DR tab 40 mg  40 mg Oral QAM AC    glucose (Dex4) 15 GM/59ML oral liquid 15 g  15 g Oral Q15 Min PRN    Or    glucose (Glutose) 40% oral gel 15 g  15 g Oral Q15 Min PRN    Or    glucose-vitamin C (Dex-4) chewable tab 4 tablet  4 tablet Oral Q15 Min PRN    Or    dextrose 50% injection 50 mL  50 mL Intravenous Q15 Min PRN    Or    glucose (Dex4) 15 GM/59ML oral liquid 30 g  30 g Oral Q15 Min PRN    Or    glucose (Glutose) 40% oral gel 30 g  30 g Oral Q15 Min PRN    Or    glucose-vitamin C (Dex-4) chewable tab 8 tablet  8 tablet Oral Q15 Min PRN    cetirizine (ZyrTEC) tab 10 mg  10 mg Oral Daily       Labs:  Lab Results   Component Value Date    WBC 14.5 01/19/2025    HGB 8.7 01/19/2025    HCT 25.1 01/19/2025    .0 01/19/2025    CREATSERUM 0.86 01/19/2025    BUN 23 01/19/2025     01/19/2025    K 3.6 01/19/2025     01/19/2025    CO2 28.0 01/19/2025     01/19/2025    CA 8.6 01/19/2025     Physical Exam:    Neuro: alert and oriented, intact   Lungs: CTA, IS 1000  Heart: s1s2 RRR, NSR sternum stable   Abdomen: soft, nontender +BS   Extremities:+pulses, trace dependent edema. Left hand grasp  weak and unable to flex at wrist. Numbness noted        Assessment/Plan:  Patient Active Problem List   Diagnosis    Abnormal stress test       POD# 3 s/p CABG x2   - HD stable, off gtts   - PW remain IN  - neuro intact, left hand weaker and limp. Not able to left at wrist. ? nerve damage. PT and time   - post op anemia, stable/ monitor  - leukocytosis, no fevers. WBC trending down now 14.5 Monitor  - renal function intact. Fluid overload, lasix ordered  - pain management, norco  - PPX: TEDs/ SCDs/ protonix   - Encourage deep breathing, coughing, I.S. Add flutter valve  - Increase activity. Ambulating in halls  - discharge planning, thinking home 1-2 days     D/W Dr.Goodwin Cheryl HERRERA RN  1/19/2025  11:04 AM         [1] No Known Allergies

## 2025-02-05 ENCOUNTER — HOME CARE VISIT (OUTPATIENT)
Dept: HOME HEALTH SERVICES | Facility: HOME HEALTH | Age: 65
End: 2025-02-05
Payer: MEDICARE

## 2025-02-05 VITALS
SYSTOLIC BLOOD PRESSURE: 120 MMHG | RESPIRATION RATE: 20 BRPM | DIASTOLIC BLOOD PRESSURE: 78 MMHG | OXYGEN SATURATION: 98 % | TEMPERATURE: 97.9 F | HEART RATE: 70 BPM

## 2025-02-05 LAB
ALBUMIN SERPL-MCNC: 4.1 G/DL (ref 3.6–5.1)
ALP SERPL-CCNC: 75 U/L (ref 35–144)
ALT SERPL-CCNC: 30 U/L (ref 9–46)
ANION GAP SERPL CALCULATED.4IONS-SCNC: 10 MMOL/L (CALC) (ref 7–17)
AST SERPL-CCNC: 27 U/L (ref 10–35)
BASOPHILS # BLD AUTO: 9 CELLS/UL (ref 0–200)
BASOPHILS NFR BLD AUTO: 0.2 %
BILIRUB SERPL-MCNC: 0.5 MG/DL (ref 0.2–1.2)
BUN SERPL-MCNC: 12 MG/DL (ref 7–25)
CALCIUM SERPL-MCNC: 8.3 MG/DL (ref 8.6–10.3)
CHLORIDE SERPL-SCNC: 106 MMOL/L (ref 98–110)
CO2 SERPL-SCNC: 23 MMOL/L (ref 20–32)
CREAT SERPL-MCNC: 0.78 MG/DL (ref 0.7–1.35)
CRP SERPL-MCNC: 9 MG/L
EGFRCR SERPLBLD CKD-EPI 2021: 98 ML/MIN/1.73M2
EOSINOPHIL # BLD AUTO: 22 CELLS/UL (ref 15–500)
EOSINOPHIL NFR BLD AUTO: 0.5 %
ERYTHROCYTE [DISTWIDTH] IN BLOOD BY AUTOMATED COUNT: 13.7 % (ref 11–15)
ERYTHROCYTE [SEDIMENTATION RATE] IN BLOOD BY WESTERGREN METHOD: 14 MM/H
GLUCOSE SERPL-MCNC: 95 MG/DL (ref 65–99)
HCT VFR BLD AUTO: 34.1 % (ref 38.5–50)
HGB BLD-MCNC: 11.1 G/DL (ref 13.2–17.1)
LYMPHOCYTES # BLD AUTO: 814 CELLS/UL (ref 850–3900)
LYMPHOCYTES NFR BLD AUTO: 18.5 %
MCH RBC QN AUTO: 29.2 PG (ref 27–33)
MCHC RBC AUTO-ENTMCNC: 32.6 G/DL (ref 32–36)
MCV RBC AUTO: 89.7 FL (ref 80–100)
MONOCYTES # BLD AUTO: 620 CELLS/UL (ref 200–950)
MONOCYTES NFR BLD AUTO: 14.1 %
NEUTROPHILS # BLD AUTO: 2935 CELLS/UL (ref 1500–7800)
NEUTROPHILS NFR BLD AUTO: 66.7 %
PLATELET # BLD AUTO: 217 THOUSAND/UL (ref 140–400)
PMV BLD REES-ECKER: 9.3 FL (ref 7.5–12.5)
POTASSIUM SERPL-SCNC: 4 MMOL/L (ref 3.5–5.3)
PROT SERPL-MCNC: 6.5 G/DL (ref 6.1–8.1)
RBC # BLD AUTO: 3.8 MILLION/UL (ref 4.2–5.8)
SODIUM SERPL-SCNC: 139 MMOL/L (ref 135–146)
WBC # BLD AUTO: 4.4 THOUSAND/UL (ref 3.8–10.8)

## 2025-02-05 ASSESSMENT — ENCOUNTER SYMPTOMS
PAIN SEVERITY GOAL: 0/10
PAIN LOCATION - EXACERBATING FACTORS: MOVEMENT, WALKING
CHANGE IN APPETITE: UNCHANGED
PAIN LOCATION - PAIN SEVERITY: 3/10
APPETITE LEVEL: GOOD
PAIN: 1
PAIN LOCATION - PAIN QUALITY: SORE, ACHING
HIGHEST PAIN SEVERITY IN PAST 24 HOURS: 3/10
LOWEST PAIN SEVERITY IN PAST 24 HOURS: 2/10
PAIN LOCATION - RELIEVING FACTORS: REST, ICE
PAIN LOCATION - PAIN FREQUENCY: FREQUENT
PAIN LOCATION: LEFT KNEE
PERSON REPORTING PAIN: PATIENT
LIMITED RANGE OF MOTION: 1
MUSCLE WEAKNESS: 1

## 2025-02-05 ASSESSMENT — ACTIVITIES OF DAILY LIVING (ADL)
AMBULATION ASSISTANCE: TWO PERSON
CURRENT_FUNCTION: TWO PERSON

## 2025-02-06 ENCOUNTER — HOME CARE VISIT (OUTPATIENT)
Dept: HOME HEALTH SERVICES | Facility: HOME HEALTH | Age: 65
End: 2025-02-06
Payer: MEDICARE

## 2025-02-07 ENCOUNTER — HOME CARE VISIT (OUTPATIENT)
Dept: HOME HEALTH SERVICES | Facility: HOME HEALTH | Age: 65
End: 2025-02-07
Payer: MEDICARE

## 2025-02-07 VITALS
TEMPERATURE: 97.9 F | DIASTOLIC BLOOD PRESSURE: 91 MMHG | RESPIRATION RATE: 18 BRPM | HEART RATE: 57 BPM | OXYGEN SATURATION: 97 % | SYSTOLIC BLOOD PRESSURE: 158 MMHG

## 2025-02-07 PROCEDURE — G0151 HHCP-SERV OF PT,EA 15 MIN: HCPCS | Mod: HHH

## 2025-02-07 SDOH — HEALTH STABILITY: PHYSICAL HEALTH: EXERCISE COMMENTS: STANDING: X20  -HEEL RAISE  -MINI SQUAT  -HIP ABDUCTION  -HAMSTRING CURL  -HIP EXTENSION  -MARCH

## 2025-02-07 ASSESSMENT — ACTIVITIES OF DAILY LIVING (ADL): AMBULATION_DISTANCE/DURATION_TOLERATED: 100

## 2025-02-07 ASSESSMENT — ENCOUNTER SYMPTOMS
PAIN: 1
PAIN LOCATION - PAIN SEVERITY: 1/10
PERSON REPORTING PAIN: PATIENT
PAIN LOCATION: LEFT KNEE

## 2025-02-07 NOTE — CASE COMMUNICATION
Patient's last dose of antibiotics via IV/PICC line is 2.8  there is already an order for PICC line removal for after 2.8 last dose.    This nurse was scheduled with patient on 2.10 to remove.  His s/o would flush to maintain daily.    They have appointment on Wednesday with Dr. Pemberton. They prefer to wait to see him to make sure he no longer needs antibiotics via IV, even though they are already discontinued. Per Dr. Pemberton earlier thi s week, he will let me/us know what the plan is after seeing patient Wednesday.    Final labs were already drawn this week. Available for review.    I have ensured that they have enough saline flushes and heparin flushes in the home to get through Wednesday.    Should the PICC line still be ordered for removal, I have moved him over to Thursday and can remove at the visit if it's not removed at the doctor's office.  If he will continue  with PICC line and additional IV antibiotics, please update order and medication list.  If he's going to maintain PICC line for a period of time, after Wednesday, pharmacy, he will need additional supplies and will need dressing changed.

## 2025-02-12 ENCOUNTER — HOME INFUSION (OUTPATIENT)
Dept: INFUSION THERAPY | Age: 65
End: 2025-02-12
Payer: MEDICARE

## 2025-02-12 ENCOUNTER — HOME CARE VISIT (OUTPATIENT)
Dept: HOME HEALTH SERVICES | Facility: HOME HEALTH | Age: 65
End: 2025-02-12
Payer: MEDICARE

## 2025-02-12 DIAGNOSIS — M25.462 KNEE EFFUSION, LEFT: Primary | ICD-10-CM

## 2025-02-12 PROCEDURE — G0299 HHS/HOSPICE OF RN EA 15 MIN: HCPCS | Mod: HHH

## 2025-02-13 ASSESSMENT — ACTIVITIES OF DAILY LIVING (ADL)
HOME_HEALTH_OASIS: 01
TOILETING: ONE PERSON
ADLS_COMMENTS: CANE OR WALKER
AMBULATION ASSISTANCE: 1
CURRENT_FUNCTION: ONE PERSON
PHYSICAL TRANSFERS ASSESSED: 1
BATHING_CURRENT_FUNCTION: ONE PERSON
BATHING ASSESSED: 1
AMBULATION ASSISTANCE: ONE PERSON
TOILETING: 1
OASIS_M1830: 01

## 2025-02-13 NOTE — CASE COMMUNICATION
Patient discharged from homecare at this time.   PT has already discharged, patient, so nursing completed the agency discharge.    There are no further skilled needs in the home. Goals have been achieved.    PICC line was removed per order of Dr. Pemberton.    Patient will follow up with MDs.

## 2025-03-06 PROBLEM — T84.54XA INFECTION OF TOTAL LEFT KNEE REPLACEMENT (CMS-HCC): Status: ACTIVE | Noted: 2024-12-28

## 2025-03-07 ENCOUNTER — OFFICE VISIT (OUTPATIENT)
Dept: PRIMARY CARE | Facility: CLINIC | Age: 65
End: 2025-03-07
Payer: MEDICARE

## 2025-03-07 VITALS
DIASTOLIC BLOOD PRESSURE: 82 MMHG | OXYGEN SATURATION: 96 % | BODY MASS INDEX: 30.12 KG/M2 | SYSTOLIC BLOOD PRESSURE: 118 MMHG | WEIGHT: 210.4 LBS | TEMPERATURE: 97.5 F | RESPIRATION RATE: 18 BRPM | HEART RATE: 82 BPM | HEIGHT: 70 IN

## 2025-03-07 DIAGNOSIS — Z96.652 HISTORY OF TOTAL KNEE REPLACEMENT, LEFT: ICD-10-CM

## 2025-03-07 DIAGNOSIS — Z12.5 SCREENING FOR PROSTATE CANCER: ICD-10-CM

## 2025-03-07 DIAGNOSIS — M17.12 PRIMARY OSTEOARTHRITIS OF LEFT KNEE: ICD-10-CM

## 2025-03-07 DIAGNOSIS — M25.50 PAIN IN UNSPECIFIED JOINT: ICD-10-CM

## 2025-03-07 DIAGNOSIS — Z00.00 ROUTINE GENERAL MEDICAL EXAMINATION AT HEALTH CARE FACILITY: Primary | ICD-10-CM

## 2025-03-07 DIAGNOSIS — I10 BENIGN HYPERTENSION: ICD-10-CM

## 2025-03-07 DIAGNOSIS — K21.9 GASTROESOPHAGEAL REFLUX DISEASE WITHOUT ESOPHAGITIS: ICD-10-CM

## 2025-03-07 PROCEDURE — 1170F FXNL STATUS ASSESSED: CPT | Performed by: FAMILY MEDICINE

## 2025-03-07 PROCEDURE — 1126F AMNT PAIN NOTED NONE PRSNT: CPT | Performed by: FAMILY MEDICINE

## 2025-03-07 PROCEDURE — 1036F TOBACCO NON-USER: CPT | Performed by: FAMILY MEDICINE

## 2025-03-07 PROCEDURE — 3079F DIAST BP 80-89 MM HG: CPT | Performed by: FAMILY MEDICINE

## 2025-03-07 PROCEDURE — G0439 PPPS, SUBSEQ VISIT: HCPCS | Performed by: FAMILY MEDICINE

## 2025-03-07 PROCEDURE — 3008F BODY MASS INDEX DOCD: CPT | Performed by: FAMILY MEDICINE

## 2025-03-07 PROCEDURE — 99214 OFFICE O/P EST MOD 30 MIN: CPT | Performed by: FAMILY MEDICINE

## 2025-03-07 PROCEDURE — 99215 OFFICE O/P EST HI 40 MIN: CPT | Mod: 25 | Performed by: FAMILY MEDICINE

## 2025-03-07 PROCEDURE — 1159F MED LIST DOCD IN RCRD: CPT | Performed by: FAMILY MEDICINE

## 2025-03-07 PROCEDURE — 3074F SYST BP LT 130 MM HG: CPT | Performed by: FAMILY MEDICINE

## 2025-03-07 PROCEDURE — 1160F RVW MEDS BY RX/DR IN RCRD: CPT | Performed by: FAMILY MEDICINE

## 2025-03-07 RX ORDER — LOSARTAN POTASSIUM 100 MG/1
100 TABLET ORAL DAILY
Qty: 90 TABLET | Refills: 3 | Status: SHIPPED | OUTPATIENT
Start: 2025-03-07

## 2025-03-07 RX ORDER — AMLODIPINE BESYLATE 5 MG/1
5 TABLET ORAL DAILY
Qty: 90 TABLET | Refills: 3 | Status: SHIPPED | OUTPATIENT
Start: 2025-03-07

## 2025-03-07 RX ORDER — OMEPRAZOLE 40 MG/1
CAPSULE, DELAYED RELEASE ORAL
Qty: 90 CAPSULE | Refills: 3 | Status: SHIPPED | OUTPATIENT
Start: 2025-03-07

## 2025-03-07 RX ORDER — MELOXICAM 15 MG/1
15 TABLET ORAL DAILY PRN
Qty: 90 TABLET | Refills: 3 | Status: SHIPPED | OUTPATIENT
Start: 2025-03-07

## 2025-03-07 RX ORDER — CEPHALEXIN 500 MG/1
500 CAPSULE ORAL 3 TIMES DAILY
COMMUNITY

## 2025-03-07 ASSESSMENT — ENCOUNTER SYMPTOMS
DEPRESSION: 0
OCCASIONAL FEELINGS OF UNSTEADINESS: 0
LOSS OF SENSATION IN FEET: 0

## 2025-03-07 ASSESSMENT — ACTIVITIES OF DAILY LIVING (ADL)
GROCERY_SHOPPING: INDEPENDENT
MANAGING_FINANCES: INDEPENDENT
TAKING_MEDICATION: INDEPENDENT
DOING_HOUSEWORK: INDEPENDENT
DRESSING: INDEPENDENT
BATHING: INDEPENDENT

## 2025-03-07 ASSESSMENT — PAIN SCALES - GENERAL: PAINLEVEL_OUTOF10: 0-NO PAIN

## 2025-03-07 NOTE — PATIENT INSTRUCTIONS
Patient is here for medicare physical.     For immunizations - you can get the following at the pharmacy:  Tdap (tetanus), Prevnar 20 (pneumonia) and Shingrix (Shingles)    For blood pressure - well controlled on amlodipine and losartan.      For reflux - using omeprazole.      For knee replacement - pt had infection - doing well.  Seeing infectious disease.  Currently on keflex - plan is treatment for 1 year.

## 2025-03-07 NOTE — PROGRESS NOTES
"Subjective   Reason for Visit: Bj Prado is an 65 y.o. male here for a Medicare Wellness visit.     Past Medical, Surgical, and Family History reviewed and updated in chart.    Reviewed all medications by prescribing practitioner or clinical pharmacist (such as prescriptions, OTCs, herbal therapies and supplements) and documented in the medical record.    Patient is here for medicare physical.      Hypertension  -Patient is here for follow-up of elevated blood pressure.  No new issues.    -Blood pressure is well controlled. Checking at home mid 120's/low 80's.    -Cardiac symptoms: none.   -Patient denies chest pain, dyspnea, and irregular heart beat.   -Cardiologist:    Osteoarthritis/Pain Management  -History of: bilateral knee osteoarthritis  -F/U: Left OA - pt had replacement - got infected Dec.  Pt states knee was stiffening - unable to walk.  Developed fever.  Saw Dr. Shelby - did clean out.  Patient had PICC line.  Improved.  Fell on ice - 1 month ago - drained it.  Saw Dr. Zelaya - no issues today.  Good ROM.  On keflex three times a day.    -Treatment: Steroid injections and synvisc - no benefit.   Meloxicam - no benefit.    -Past Evaluation: xrays.    -Specialist: Dr. Zelaya; Dr. Pemberton   -Past Medications:          Patient Care Team:  Keith Doe DO as PCP - General  Casey Alston DO as Primary Care Provider  Janet Pemberton MD as Consulting Physician (Infectious Diseases)     Review of Systems    Objective   Vitals:  /82   Pulse 82   Temp 36.4 °C (97.5 °F) (Temporal)   Resp 18   Ht 1.778 m (5' 10\")   Wt 95.4 kg (210 lb 6.4 oz)   SpO2 96%   BMI 30.19 kg/m²       Physical Exam  Vitals reviewed.   Constitutional:       General: He is not in acute distress.  Cardiovascular:      Rate and Rhythm: Normal rate and regular rhythm.   Pulmonary:      Effort: Pulmonary effort is normal.      Breath sounds: No wheezing or rhonchi.   Musculoskeletal:      Right lower leg: No edema.      Left " lower leg: No edema.   Lymphadenopathy:      Cervical: No cervical adenopathy.   Neurological:      Mental Status: He is alert.         Assessment & Plan  Routine general medical examination at health care facility         Primary osteoarthritis of left knee         History of total knee replacement, left            Patient Instructions   Patient is here for medicare physical.     For immunizations - you can get the following at the pharmacy:  Tdap (tetanus), Prevnar 20 (pneumonia) and Shingrix (Shingles)    For blood pressure - well controlled on amlodipine and losartan.      For reflux - using omeprazole.      For knee replacement - pt had infection - doing well.  Seeing infectious disease.  Currently on keflex - plan is treatment for 1 year.

## 2025-05-08 LAB
CHOLEST SERPL-MCNC: 173 MG/DL
CHOLEST/HDLC SERPL: 2.9 (CALC)
HDLC SERPL-MCNC: 59 MG/DL
LDLC SERPL CALC-MCNC: 90 MG/DL (CALC)
NONHDLC SERPL-MCNC: 114 MG/DL (CALC)
PSA SERPL-MCNC: 0.46 NG/ML
TRIGL SERPL-MCNC: 147 MG/DL

## 2025-12-04 ENCOUNTER — APPOINTMENT (OUTPATIENT)
Dept: DERMATOLOGY | Facility: CLINIC | Age: 65
End: 2025-12-04
Payer: MEDICARE

## 2025-12-17 ENCOUNTER — APPOINTMENT (OUTPATIENT)
Dept: DERMATOLOGY | Facility: CLINIC | Age: 65
End: 2025-12-17
Payer: MEDICARE

## (undated) DEVICE — SUTURE, VICRYL, 1, 36 IN, CT-1, UNDYED

## (undated) DEVICE — SUTURE, V-LOC, 3-0, 23IN, P-12, 90 ABS

## (undated) DEVICE — GLOVE, SURGICAL, PROTEXIS NEOPRENE, 8.0, PF, LF

## (undated) DEVICE — NEEDLE, SPINAL, 20 G X 3.5 IN, YELLOW HUB

## (undated) DEVICE — WIPE, FILM BARRIER, CAVILON, 2 IN 1, NO STING, 1ML

## (undated) DEVICE — CATHETER TRAY, SURESTEP, 14FR, PRECONNECTED DRAIN BAG

## (undated) DEVICE — BLADE, LARGE BONE, HALL, OSCILLATING, 13X90X1.27MM

## (undated) DEVICE — HOOD, SURGICAL, FLYTE, T7 PLUS, PEEL AWAY SHIELD

## (undated) DEVICE — TRAY, MINOR, SINGLE BASIN, STERILE

## (undated) DEVICE — COVER, TABLE, 44X90

## (undated) DEVICE — STRIP, SKIN CLOSURE, STERI STRIP, REINFORCED, 0.5 X 4 IN

## (undated) DEVICE — CUFF, TOURNIQUET, 34 X 4, SNGL PORT/SNGL BLADDER, DISP, LF

## (undated) DEVICE — TIP, SUCTION, YANKAUER, FLEXIBLE

## (undated) DEVICE — IRRIGATION SYSTEM, WOUND, SURGIPHOR, 450ML, STERILE

## (undated) DEVICE — SEALER, BIPOLAR, AQUA MANTYS 6.0

## (undated) DEVICE — SUTURE, FIBERWIRE 2, T-5 TAPER NEEDLE, 38"

## (undated) DEVICE — PAD, GROUNDING, ELECTROSURGICAL, W/9 FT CABLE, POLYHESIVE II, ADULT, LF

## (undated) DEVICE — ADHESIVE, SKIN, LIQUIBAND EXCEED

## (undated) DEVICE — BLADE, RECIPROCATOR 12.5 X 76 X 0.9MM

## (undated) DEVICE — SUTURE, QUILL, BARBED, PDO, 2, 24 X 24CM, T8 36MM TAPER POINT, 1/2 CIRCLE

## (undated) DEVICE — BANDAGE, ELASTIC, PREMIUM, SELF-CLOSE, 6 IN X 5.5 YD, STERILE

## (undated) DEVICE — SOLUTION, IRRIGATION, SODIUM CHLORIDE 0.9%, 1000 ML, POUR BOTTLE

## (undated) DEVICE — GLOVE, SURGICAL, PROTEXIS NEOPRENE, 8.5, PF, LF

## (undated) DEVICE — HEMOSTAT, ARISTA, ABSORBABLE, 3 GRAMS

## (undated) DEVICE — CUFF, TOURNIQUET, 30 X 4, SNGL PORT/SNGL BLADDER, DISP, LF

## (undated) DEVICE — SYRINGE, 50 CC, LUER LOCK

## (undated) DEVICE — BLADE, OSCILLATING, LARGE BONE, 19.5814 X 90 X 1.27 MM

## (undated) DEVICE — KIT, MINOR, DOUBLE BASIN

## (undated) DEVICE — SUTURE, PDS II, 0, 27 IN, CT-2, VIOLET

## (undated) DEVICE — SYRINGE, 60 CC, LUER LOCK, MONOJECT

## (undated) DEVICE — SUTURE, CTD, VICRYL, 2-0, UND, BR, CT-2

## (undated) DEVICE — WOUND LAVAGE, BACTISURE

## (undated) DEVICE — DRESSING, MEPILEX BORDER, POST-OP AG, 4 X 10 IN

## (undated) DEVICE — SUTURE, FIBERWIRE 5

## (undated) DEVICE — DRESSING, PREVENA, PEEL AND PLACE, 20CM

## (undated) DEVICE — BOWL, MIXING, W/SPATULA, DISPOSABLE

## (undated) DEVICE — BANDAGE, ELASTIC,  6 IN X 11 YDS, STERILE, LF

## (undated) DEVICE — DRAPE PACK, TOTAL KNEE, CUSTOM, GEAUGA

## (undated) DEVICE — SUTURE, PDS II, 1, 27 IN, CP-1, VIOLET

## (undated) DEVICE — THERAPY UNIT, 14-DAY PREVENA PLUS 125

## (undated) DEVICE — IRRIGATION SYSTEM, WOUND, PULSAVAC PLUS